# Patient Record
Sex: FEMALE | Race: WHITE | NOT HISPANIC OR LATINO | Employment: OTHER | URBAN - METROPOLITAN AREA
[De-identification: names, ages, dates, MRNs, and addresses within clinical notes are randomized per-mention and may not be internally consistent; named-entity substitution may affect disease eponyms.]

---

## 2017-04-06 ENCOUNTER — GENERIC CONVERSION - ENCOUNTER (OUTPATIENT)
Dept: OTHER | Facility: OTHER | Age: 57
End: 2017-04-06

## 2017-04-07 ENCOUNTER — GENERIC CONVERSION - ENCOUNTER (OUTPATIENT)
Dept: OTHER | Facility: OTHER | Age: 57
End: 2017-04-07

## 2017-04-07 LAB — T4 FREE SERPL-MCNC: 1.26 NG/DL (ref 0.82–1.77)

## 2017-05-23 ENCOUNTER — ALLSCRIPTS OFFICE VISIT (OUTPATIENT)
Dept: OTHER | Facility: OTHER | Age: 57
End: 2017-05-23

## 2017-05-23 DIAGNOSIS — E04.9 NONTOXIC GOITER: ICD-10-CM

## 2017-05-27 ENCOUNTER — HOSPITAL ENCOUNTER (OUTPATIENT)
Dept: RADIOLOGY | Facility: HOSPITAL | Age: 57
Discharge: HOME | End: 2017-05-27
Payer: COMMERCIAL

## 2017-05-27 DIAGNOSIS — E04.9 NONTOXIC GOITER: ICD-10-CM

## 2017-05-27 PROCEDURE — 76536 US EXAM OF HEAD AND NECK: CPT

## 2017-05-30 ENCOUNTER — GENERIC CONVERSION - ENCOUNTER (OUTPATIENT)
Dept: OTHER | Facility: OTHER | Age: 57
End: 2017-05-30

## 2017-07-20 ENCOUNTER — ALLSCRIPTS OFFICE VISIT (OUTPATIENT)
Dept: OTHER | Facility: OTHER | Age: 57
End: 2017-07-20

## 2017-07-20 DIAGNOSIS — Z12.39 ENCOUNTER FOR OTHER SCREENING FOR MALIGNANT NEOPLASM OF BREAST: ICD-10-CM

## 2017-09-19 ENCOUNTER — ALLSCRIPTS OFFICE VISIT (OUTPATIENT)
Dept: OTHER | Facility: OTHER | Age: 57
End: 2017-09-19

## 2017-09-19 LAB — S PYO AG THROAT QL: NEGATIVE

## 2017-10-24 ENCOUNTER — HOSPITAL ENCOUNTER (OUTPATIENT)
Dept: RADIOLOGY | Facility: HOSPITAL | Age: 57
Discharge: HOME | End: 2017-10-24
Payer: COMMERCIAL

## 2017-10-24 DIAGNOSIS — Z12.39 ENCOUNTER FOR OTHER SCREENING FOR MALIGNANT NEOPLASM OF BREAST: ICD-10-CM

## 2017-10-24 PROCEDURE — 77063 BREAST TOMOSYNTHESIS BI: CPT

## 2017-10-24 PROCEDURE — G0202 SCR MAMMO BI INCL CAD: HCPCS

## 2017-10-25 ENCOUNTER — GENERIC CONVERSION - ENCOUNTER (OUTPATIENT)
Dept: OTHER | Facility: OTHER | Age: 57
End: 2017-10-25

## 2017-11-02 ENCOUNTER — GENERIC CONVERSION - ENCOUNTER (OUTPATIENT)
Dept: OTHER | Facility: OTHER | Age: 57
End: 2017-11-02

## 2017-11-02 LAB
A/G RATIO (HISTORICAL): 1.8 (ref 1.2–2.2)
ALBUMIN SERPL BCP-MCNC: 4.6 G/DL (ref 3.5–5.5)
ALP SERPL-CCNC: 86 IU/L (ref 39–117)
ALT SERPL W P-5'-P-CCNC: 21 IU/L (ref 0–32)
AST SERPL W P-5'-P-CCNC: 24 IU/L (ref 0–40)
BILIRUB SERPL-MCNC: 0.7 MG/DL (ref 0–1.2)
BUN SERPL-MCNC: 13 MG/DL (ref 6–24)
BUN/CREA RATIO (HISTORICAL): 18 (ref 9–23)
CALCIUM SERPL-MCNC: 9.2 MG/DL (ref 8.7–10.2)
CHLORIDE SERPL-SCNC: 99 MMOL/L (ref 96–106)
CHOLEST SERPL-MCNC: 209 MG/DL (ref 100–199)
CO2 SERPL-SCNC: 23 MMOL/L (ref 18–29)
CREAT SERPL-MCNC: 0.72 MG/DL (ref 0.57–1)
EGFR AFRICAN AMERICAN (HISTORICAL): 106 ML/MIN/1.73
EGFR-AMERICAN CALC (HISTORICAL): 92 ML/MIN/1.73
GLUCOSE SERPL-MCNC: 96 MG/DL (ref 65–99)
HDLC SERPL-MCNC: 79 MG/DL
LDLC SERPL CALC-MCNC: 109 MG/DL (ref 0–99)
POTASSIUM SERPL-SCNC: 4.6 MMOL/L (ref 3.5–5.2)
SODIUM SERPL-SCNC: 139 MMOL/L (ref 134–144)
TOT. GLOBULIN, SERUM (HISTORICAL): 2.6 G/DL (ref 1.5–4.5)
TOTAL PROTEIN (HISTORICAL): 7.2 G/DL (ref 6–8.5)
TRIGL SERPL-MCNC: 104 MG/DL (ref 0–149)

## 2017-11-03 ENCOUNTER — GENERIC CONVERSION - ENCOUNTER (OUTPATIENT)
Dept: OTHER | Facility: OTHER | Age: 57
End: 2017-11-03

## 2017-11-03 LAB
INTERPRETATION (HISTORICAL): NORMAL
T4 FREE SERPL-MCNC: 1.36 NG/DL (ref 0.82–1.77)
TSH SERPL DL<=0.05 MIU/L-ACNC: 4.62 UIU/ML (ref 0.45–4.5)

## 2017-11-13 ENCOUNTER — ALLSCRIPTS OFFICE VISIT (OUTPATIENT)
Dept: OTHER | Facility: OTHER | Age: 57
End: 2017-11-13

## 2017-11-14 NOTE — PROGRESS NOTES
Assessment    1  Hypothyroidism (244 9) (E03 9)   2  BMI 28 0-28 9,adult (V85 24) (Z68 28)   3  Anxiety (300 00) (F41 9)    Plan  Anxiety, Symptomatic menopausal or female climacteric states    · Venlafaxine HCl ER 37 5 MG Oral Capsule Extended Release 24 Hour; 1 podaily  BMI 28 0-28 9,adult    · Begin a limited exercise program ; Status:Complete;   Done: 19FVL2596  Hypothyroidism    · Levothyroxine Sodium 50 MCG Oral Tablet; TAKE 1 TABLET DAILY ASDIRECTED   · (1) T4, FREE; Status:Active; Requested for:01May2018;    · (1) TSH; Status:Active; Requested for:01May2018;    · Follow-up visit in 6 months Evaluation and Treatment  Follow-up  Status: Hold For -Scheduling  Requested for: 73GZN3957    Discussion/Summary    Hypothyroidism - TSH is borderline, but she is feeling well  Will continue current dose- stable  Possible side effects of new medications were reviewed with the patient/guardian today  The treatment plan was reviewed with the patient/guardian  The patient/guardian understands and agrees with the treatment plan      Chief Complaint  pt present for follow up on lab work  ac/cma      History of Present Illness  Her anxiety has been good  the venlafaxine is working well for her  The patient is being seen for follow-up of hypothyroidism of undetermined etiology  The patient reports doing well  Interval symptoms:  worsened weight gain  Medications:  the patient is adherent to her medication regimen, but-- she denies medication side effects  Disease management:  the patient is not doing well with her goals  Active Problems    1  Allergic rhinitis (477 9) (J30 9)   2  Anxiety (300 00) (F41 9)   3  Apnea (786 03) (R06 81)   4  Arthropathy (716 90) (M12 9)   5  Atrophic vaginitis (627 3) (N95 2)   6  Complex cyst of right ovary (620 2) (N83 291)   7  Dyspareunia, female (625 0) (N94 10)   8  Encounter for other screening for malignant neoplasm of breast (V76 19) (Z12 39)   9  Goiter (240 9) (E04 9)   10  Hypothyroidism (244 9) (E03 9)   11  Long term use of drug (V58 69) (Z79 899)   12  Obstructive sleep apnea (327 23) (G47 33)   13  Postmenopausal atrophic vaginitis (627 3) (N95 2)   14  Screening for cardiovascular condition (V81 2) (Z13 6)   15  Shellfish allergy (V15 04) (Z91 013)   16  Symptomatic menopausal or female climacteric states (627 2) (N95 1)   17  Uterus fibroma (218 9) (D25 9)   18  Visit for routine gyn exam (V72 31) (Z01 419)    Past Medical History    1  Acute pharyngitis (462) (J02 9)   2  History of Acute upper respiratory infection (465 9) (J06 9)   3  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)   4  History of Encounter for screening for malignant neoplasm of colon (V76 51) (Z12 11)   5  History of Encounter for screening mammogram for malignant neoplasm of breast (V76 12) (Z12 31)   6  History of acute sinusitis (V12 69) (Z87 09)   7  History of acute sinusitis (V12 69) (Z87 09)   8  History of hyperthyroidism (V12 29) (Z86 39)   9  Need for vaccination for DTP (V06 1) (Z23)   10  History of Postoperative visit (V58 49) (Z48 89)   11  Screening for deficiency anemia (V78 1) (Z13 0)   12  Screening for hyperlipidemia (V77 91) (Z13 220)   13  History of Urinary Tract Infection (V13 02)   14  History of Well adult on routine health check (V70 0) (Z00 00)    Surgical History  1  History of  Section   2  History of  Section   3  History of Tubal Ligation    Family History  Mother    1  Family history of malignant neoplasm of uterus (V16 49) (Z80 49)   2  Family history of Heart problem  Father    3  Family history of diabetes mellitus (V18 0) (Z83 3)  Brother    4  Family history of malignant neoplasm of tonsil (V16 8) (Z80 8)  Maternal Cousin    5   Family history of malignant neoplasm of uterus (V16 49) (Z80 49)    Social History     · Acute alcohol use   · Currently sexually active   · Daily caffeine consumption, 2-3 servings a day   · Denied: History of Drug use   · Exercises 1 to 2 times per week (V49 89) (Z78 9)   · Full-time employment   ·    · Never a smoker   · Two children    Current Meds   1  B Complex-C Oral Tablet; TAKE 1 TABLET DAILY AS DIRECTED; Therapy: (Recorded:19Sep2017) to Recorded   2  Calcium Aspartate TABS; 1000 mg once a day; Therapy: (Recorded:19Sep2017) to Recorded   3  Claritin 10 MG Oral Capsule; Take one tablet daily; Therapy: (Recorded:19Sep2017) to Recorded   4  Daily Vitamin Oral Tablet; 1 Every Day; Therapy: 81ZWK9071 to  Requested for: 22PLW3007 Recorded   5  EpiPen 2-Fabian 0 3 MG/0 3ML Injection Solution Auto-injector; inject as needed for allergic reaction; Therapy: 25ZDJ0259 to (Last Rx:25Kxq0067)  Requested for: 88KQY3717 Ordered   6  Latanoprost 0 005 % Ophthalmic Solution; INSTILL 1 DROP IN BOTH EYES AT BEDTIME Recorded   7  Levothyroxine Sodium 50 MCG Oral Tablet; TAKE 1 TABLET DAILY AS DIRECTED; Therapy: 22BGJ7223 to (Evaluate:14Jan2018)  Requested for: 99VTS5464; Last Rx:79Srz3671 Ordered   8  Venlafaxine HCl ER 37 5 MG Oral Capsule Extended Release 24 Hour; 1 po daily; Therapy: 19RGA5799 to (Evaluate:25Nov2017)  Requested for: 15XMM2920; Last Rx:30Nov2016 Ordered   9  Vitamin D 1000 UNIT CAPS; once daily; Therapy: (Recorded:19Sep2017) to Recorded    Allergies  1  No Known Drug Allergies  2  Shellfish    Vitals  Vital Signs    Recorded: 80DYI0350 06:54PM   Temperature 97 9 F   Heart Rate 88   Respiration 16   Systolic 405   Diastolic 70   Height 5 ft 4 5 in   Weight 170 lb    BMI Calculated 28 73   BSA Calculated 1 84       Physical Exam   Constitutional  General appearance: No acute distress, well appearing and well nourished  Ears, Nose, Mouth, and Throat  External inspection of ears and nose: Normal    Otoscopic examination: Tympanic membranes translucent with normal light reflex  Canals patent without erythema  Oropharynx: Normal with no erythema, edema, exudate or lesions     Pulmonary  Auscultation of lungs: Clear to auscultation  no rales or crackles were heard bilaterally  no rhonchi  no friction rub  no wheezing  Cardiovascular  Auscultation of heart: Normal rate and rhythm, normal S1 and S2, without murmurs     Musculoskeletal  Gait and station: Normal          Signatures   Electronically signed by : Chelsey Copeland DO; Nov 13 2017  7:18PM EST                       (Author)

## 2018-01-10 NOTE — RESULT NOTES
Discussion/Summary   Tenisha Severe,   Your thyroid ultrasound does not show any nodules  You do have thyroiditis which is related to your underactive thyroid  Dr Rich Menard [id number] 07:55AM Crowmaciej Ministerioanh Order Number: [id number]    - Patient Instructions: To schedule this appointment, please contact Central Scheduling at [phone]  Test Name Result Flag Reference   US THYROID (Report)     THYROID ULTRASOUND     INDICATION: 47-year-old female with hypothyroidism  COMPARISON: None  TECHNIQUE:  Ultrasound of the thyroid was performed with a high frequency linear transducer in transverse and sagittal planes including volumetric imaging sweeps as well as traditional still imaging technique  FINDINGS:   Thyroid parenchyma is diffusely heterogeneous in echotexture  Right gland: 4 0 x 1 5 x 1 4 cm  No dominant nodules  Moderate vascularity throughout the lobe  Left gland: 3 3 x 1 5 x 1 5 cm  No dominant nodules  Moderate vascularity throughout the lobe  Isthmus: The isthmus is 0 2 cm in AP dimension  IMPRESSION:      Heterogeneous, hypervascular thyroid gland consistent with thyroiditis  No discrete nodules               Workstation performed: [id number]     Signed by:   Kia Gomez MD   5/30/17

## 2018-01-11 NOTE — RESULT NOTES
Message   The ultrasound shows 3 small fibroids which are stable but there is a large cyst of the right ovary  Please return to the office to evaluate and discuss options     Verified Results  Edna Morillo 12:49PM Vish Kim Order Number: [id number]    Order Number: [id number]     Test Name Result Flag Reference   US PELVIS COMPLETE NON OB (Report)     PELVIC ULTRASOUND, COMPLETE     INDICATION: Leiomyoma of uterus, unspecified  COMPARISON: July 18, 2011     TECHNIQUE:  Transabdominal pelvic ultrasound was performed in sagittal and transverse planes with a curvilinear transducer  Additional transvaginal imaging was performed to better evaluate the endometrium and ovaries  Imaging included volumetric    sweeps as well as traditional still imaging technique  FINDINGS:     UTERUS:   The uterus is anteverted in position, measuring 10 9 x 4 5 x 5 9 cm  Uterus enlarged and heterogeneous in echotexture  2 1 x 2 1 x 2 3 cm fibroid in the posterior aspect of the uterine fundus  This is in close approximation to the endometrial stripe  Previously, this measured 2 9 x 2 9 x 2 5 cm  2 7 x 2 2 x 2 7 cm    subserosal fibroid in the posterior, left lateral aspect of the uterine fundus (previously 2 7 x 1 9 x 2 9 cm)  2 0 x 1 5 x 1 8 cm peripherally calcified intramural fibroid in the anterior aspect of the mid body of the uterus, not seen on the prior    examination  The cervix shows no suspicious abnormality  ENDOMETRIUM:    Normal caliber of 5 mm  Homogenous and normal in appearance  OVARIES/ADNEXA:   Right ovary: 12 2 x 8 7 x 8 6 cm  There is a 11 0 x 7 7 x 8 0 cm complex, predominantly cystic mass in the right adnexa, encompassing nearly the entire right ovary  There are low-level internal echoes with multiple internal septations  There is peripheral flow  Left ovary: 2 9 x 1 4 x 1 6 cm  No suspicious left ovarian abnormality        Doppler flow within normal limits  No suspicious adnexal mass or loculated collections  There is no free fluid  IMPRESSION:   1  Enlarged fibroid uterus  2  Approximately 11 cm complex cystic mass in the right adnexa, encompassing nearly the entire right ovary  According to the consensus conference statement from the Society of Radiologists in Ultrasound (Radiology:Volume 256: Number 3-September 2010  pp [phone] ) this lesion has imaging features of a complex cystic mass  In this premenopausal woman, this should be further evaluated by MR and/or surgical consultation  Also, consider follow-up CA-125           ##cfslh   I personally discussed this result with Tyrone Alvarado on 7/19/2016 2:20 PM    ##            Workstation performed: [id number]     Signed by:   Anitha Hays DO   7/19/16

## 2018-01-12 VITALS
RESPIRATION RATE: 14 BRPM | TEMPERATURE: 99.9 F | DIASTOLIC BLOOD PRESSURE: 70 MMHG | WEIGHT: 164 LBS | HEART RATE: 78 BPM | HEIGHT: 65 IN | SYSTOLIC BLOOD PRESSURE: 128 MMHG | BODY MASS INDEX: 27.32 KG/M2

## 2018-01-12 NOTE — MISCELLANEOUS
Message  Return to work or school:   Trish Green is under my professional care  She was seen in my office on 9/19/2017   She is able to return to work on  9/21/2017       DENISE Olson        Signatures   Electronically signed by : Issa Hsu; Sep 19 2017  9:24AM EST                       (Author)    Electronically signed by : Sanjana Dangelo DO; Sep 19 2017  8:26PM EST                       (Author)

## 2018-01-12 NOTE — RESULT NOTES
Verified Results  MAMMO SCREENING BILATERAL W 3D & CAD [id number] 01:51PM Gunner Nab Order Number: [id number]    Order Number: [id number]    Order Number: [id number]    - Patient Instructions: To schedule this appointment, please contact Central Scheduling at [phone]  Do not wear any perfume, powder, lotion or deodorant on breast or underarm area  Please bring your doctors order, referral (if needed) and insurance information with you on the day of the test  Failure to bring this information may result in this test being rescheduled  Arrive 15 minutes prior to your appointment time to register  On the day of your test, please bring any prior mammogram or breast studies with you that were not performed at a [facility]  Failure to bring prior exams may result in your test needing to be rescheduled  Test Name Result Flag Reference   MAMMO SCREENING BILATERAL W 3D & CAD (Report)     Patient History:   Patient is postmenopausal    Family history of breast cancer in mother at age 80  Reason for exam: screening (asymptomatic)  Screening     Mammo Screening Bilateral W DBT and CAD: October 17, 2016 - Check   In #: [de-identified]   2D/3D Procedure   3D views: Bilateral MLO, CC, and XCCL view(s) were taken  2D views: Bilateral MLO and CC view(s) were taken  Technologist: Freddy Hooker   Prior study comparison: September 3, 2015, bilateral screening    mammogram performed at [place] [address]  July 28, 2014, bilateral screening mammogram performed at [place] [address]  July 10, 2013, bilateral screening    mammogram performed at [place] [address]  June 28, 2012, bilateral screening mammogram performed at [place] [address]  June 2, 2011, right breast screening    mammogram performed at [place] [address]   May    18, 2010, bilateral screening mammogram performed at [place] [address]  March 26, 2009, screening mammogram    performed at [place] [address]  The breast tissue is extremely dense, potentially limiting the    sensitivity of mammography  Patient risk, included in this    report, assists in determining the appropriate screening regimen    (such as 3-D mammography or the inclusion of automated breast    ultrasound or MRI)  3-D mammography may also remain indicated as    screening  No dominate soft tissue mass, architectural    distortion or suspicious calcifications are noted  The skin and    nipple contours are within normal limits  No evidence of    malignancy  No significant change when compared to the prior    studies  1  No evidence of malignancy  2  No significant change when compared with the prior study  ASSESSMENT: BiRad:1 - Negative     Recommendation:   Routine screening mammogram of both breasts in 1 year  A reminder letter will be scheduled   8-10% of cancers will be missed on mammography  Management of a    palpable abnormality must be based on clinical grounds  Patients    will be notified of their results via letter from our facility  Accredited by [employer] of Radiology and FDA       Transcription Location: [facility] 98: [id number]     Risk Value(s):   Tyrer-Cuzick 10 Year: 6 658%, Tyrer-Cuzick Lifetime: 17 606%,    Myriad Table: 1 5%, JANETH 5 Year: 2 4%, NCI Lifetime: 13 4%   Signed by:   Lea Finch MD   10/17/16

## 2018-01-13 VITALS
BODY MASS INDEX: 27.38 KG/M2 | SYSTOLIC BLOOD PRESSURE: 128 MMHG | HEIGHT: 66 IN | DIASTOLIC BLOOD PRESSURE: 64 MMHG | WEIGHT: 170.38 LBS

## 2018-01-13 NOTE — RESULT NOTES
Discussion/Summary   Appointment 11/13/17   Please print and put in my folder  Dr Jeremías Lynch      Verified Results  (1) TSH [id number] 08:30AM Pia Linen     Test Name Result Flag Reference   TSH 4 620 uIU/mL H 0 450-4 500     (1) COMPREHENSIVE METABOLIC PANEL 59KCN8276 16:20BW Pia Linen     Test Name Result Flag Reference   Glucose, Serum 96 mg/dL  65-99   BUN 13 mg/dL  6-24   Creatinine, Serum 0 72 mg/dL  0 57-1 00   BUN/Creatinine Ratio 18  9-23   Sodium, Serum 139 mmol/L  134-144   Potassium, Serum 4 6 mmol/L  3 5-5 2   Chloride, Serum 99 mmol/L     Carbon Dioxide, Total 23 mmol/L  18-29   Calcium, Serum 9 2 mg/dL  8 7-10 2   Protein, Total, Serum 7 2 g/dL  6 0-8 5   Albumin, Serum 4 6 g/dL  3 5-5 5   Globulin, Total 2 6 g/dL  1 5-4 5   A/G Ratio 1 8  1 2-2 2   Bilirubin, Total 0 7 mg/dL  0 0-1 2   Alkaline Phosphatase, S 86 IU/L     AST (SGOT) 24 IU/L  0-40   ALT (SGPT) 21 IU/L  0-32   eGFR If NonAfricn Am 92 mL/min/1 73  >59   eGFR If Africn Am 106 mL/min/1 73  >59     (1) LIPID PANEL FASTING W DIRECT LDL REFLEX [id number] 08:30AM Pia Linen     Test Name Result Flag Reference   Cholesterol, Total 209 mg/dL H 100-199   Triglycerides 104 mg/dL  0-149   HDL Cholesterol 79 mg/dL  >39   LDL Cholesterol Calc 109 mg/dL H 0-99     (LC) Thyroxine (T4) Free, Direct, [place] 60MBC2256 08:30AM Pia Linen     Test Name Result Flag Reference   T4,Free(Direct) 1 36 ng/dL  0 82-1 77     [facility]) Cardiovascular Risk Assessment 23MDE8251 08:30AM Pia Linen     Test Name Result Flag Reference   Interpretation Note     Supplemental report is available  PDF Image

## 2018-01-14 VITALS
SYSTOLIC BLOOD PRESSURE: 124 MMHG | HEART RATE: 88 BPM | HEIGHT: 65 IN | TEMPERATURE: 97.9 F | BODY MASS INDEX: 28.32 KG/M2 | DIASTOLIC BLOOD PRESSURE: 70 MMHG | WEIGHT: 170 LBS | RESPIRATION RATE: 16 BRPM

## 2018-01-14 NOTE — RESULT NOTES
Verified Results  (1) TSH 06Apr2017 12:13PM [facility] [facility] [employer]     Test Name Result Flag Reference   TSH 4 120 uIU/mL  0 450-4 500     [facility]) Thyroxine (T4) Free, Direct, S [id number] 12:13PM [employer] [facility] [employer]     Test Name Result Flag Reference   T4,Free(Direct) 1 26 ng/dL  0 82-1 77       Discussion/Summary   Tahmina Ruiz,   Your thyroid levels are normal   Please schedule a follow up appointment       Dr George Bell

## 2018-01-15 NOTE — RESULT NOTES
Verified Results  MAMMO SCREENING BILATERAL W 3D & CAD [id number] 04:24PM Kathi Abler Order Number: [id number]    - Patient Instructions: To schedule this appointment, please contact Central Scheduling at [phone]  Do not wear any perfume, powder, lotion or deodorant on breast or underarm area  Please bring your doctors order, referral (if needed) and insurance information with you on the day of the test  Failure to bring this information may result in this test being rescheduled  Arrive 15 minutes prior to your appointment time to register  On the day of your test, please bring any prior mammogram or breast studies with you that were not performed at a [facility]  Failure to bring prior exams may result in your test needing to be rescheduled  Test Name Result Flag Reference   MAMMO SCREENING BILATERAL W 3D & CAD (Report)     Patient History:   Patient is postmenopausal    Family history of endometrial cancer at age 80 in mother  Patient's BMI is 26 8  Reason for exam: screening, asymptomatic  Mammo Screening Bilateral W DBT and CAD: October 24, 2017 - Check   In #: [de-identified]   2D/3D Procedure   3D Bilateral CC and MLO view(s) were taken  2D Bilateral CC and MLO view(s) were taken  Technologist: SAHARA Tamez   Prior study comparison: October 17, 2016, mammo screening    bilateral W DBT and CAD performed at [place] [address]  September 3, 2015, bilateral screening mammogram    performed at [place] [address]  July 28, 2014,   bilateral screening mammogram performed at [place] [address]  July 10, 2013, bilateral screening mammogram    performed at [place] [address]  June 28, 2012,   bilateral screening mammogram performed at [place] [address]  The breast tissue is heterogeneously dense, potentially limiting    the sensitivity of mammography   Patient risk, included in this    report, assists in determining the appropriate screening regimen    (such as 3-D mammography or the inclusion of automated breast    ultrasound or MRI)  3-D mammography may also remain indicated as    screening  No dominant soft tissue mass, architectural distortion or    suspicious calcifications are noted in either breast   The skin    and nipple contours are within normal limits  No significant changes when compared with prior studies  ACR BI-RADSï¾® Assessments: BiRad:1 - Negative   A reminder letter will be scheduled  Recommendation:   Routine screening mammogram of both breasts in 1 year  A    reminder letter will be scheduled  The patient is scheduled in a reminder system for screening    mammography  8-10% of cancers will be missed on mammography  Management of a    palpable abnormality must be based on clinical grounds  Patients    will be notified of their results via letter from our facility  Accredited by [employer] of [employer] and FDA       Transcription Location: [facility] 98: [id number]     Risk Value(s):   Tyrer-Cuzick 10 Year: 3 700%, Tyrer-Cuzick Lifetime: 9 700%,    Myriad Table: 1 5%, JANETH 5 Year: 1 4%, NCI Lifetime: 7 6%

## 2018-01-15 NOTE — RESULT NOTES
Message   appointment 5/27/16   Please print and put in my folder   Dr Kelby Najjar     Verified Results  (1) COMPREHENSIVE METABOLIC PANEL [id number] 04:76VQ [employer]     Test Name Result Flag Reference   Glucose, Serum 84 mg/dL  65-99   BUN 14 mg/dL  6-24   Creatinine, Serum 0 71 mg/dL  0 57-1 00   eGFR If NonAfricn Am 94 mL/min/1 73  >59   eGFR If Africn Am 109 mL/min/1 73  >59   BUN/Creatinine Ratio 20  9-23   Sodium, Serum 140 mmol/L  134-144   Potassium, Serum 4 6 mmol/L  3 5-5 2   Chloride, Serum 100 mmol/L     Carbon Dioxide, Total 26 mmol/L  18-29   Calcium, Serum 9 3 mg/dL  8 7-10 2   Protein, Total, Serum 7 2 g/dL  6 0-8 5   Albumin, Serum 4 7 g/dL  3 5-5 5   Globulin, Total 2 5 g/dL  1 5-4 5   A/G Ratio 1 9  1 1-2 5   Bilirubin, Total 0 5 mg/dL  0 0-1 2   Alkaline Phosphatase, S 91 IU/L     AST (SGOT) 27 IU/L  0-40   ALT (SGPT) 23 IU/L  0-32     (LC) Lipid Panel With LDL/HDL Ratio [id number] 08:22AM [employer]     Test Name Result Flag Reference   Cholesterol, Total 200 mg/dL H 100-199   Triglycerides 65 mg/dL  0-149   HDL Cholesterol 84 mg/dL  >39   According to ATP-III Guidelines, HDL-C >59 mg/dL is considered a  negative risk factor for CHD  VLDL Cholesterol Jag 13 mg/dL  5-40   LDL Cholesterol Calc 103 mg/dL H 0-99   LDL/HDL Ratio 1 2 ratio units  0 0-3 2   LDL/HDL Ratio                                                             Men  Women                                               1/2 Avg  Risk  1 0    1 5                                                   Avg Risk  3 6    3 2                                                2X Avg  Risk  6 2    5 0                                                3X Avg  Risk  8 0    6 1     (LC) TSH+Free T4 [id number] 08:22AM [employer]     Test Name Result Flag Reference   TSH 4 080 uIU/mL  0 450-4 500   T4,Free(Direct) 1 30 ng/dL  0 82-1 77     [facility] Cardiovascular Risk Assessment [id number] 08:22AM [employer]     Test Name Result Flag Reference Interpretation Note     Supplement report is available  PDF Image

## 2018-01-16 NOTE — PROGRESS NOTES
Assessment    1  Encounter for preventive health examination (V70 0) (Z00 00)   2  BMI 27 0-27 9,adult (V85 23) (Z68 27)   3  Goiter (240 9) (E04 9)   4  Hypothyroidism (244 9) (E03 9)   5  Screening for cardiovascular condition (V81 2) (Z13 6)   6  Never a smoker   7  Need for vaccination for DTP (V06 1) (Z23)   8  Shellfish allergy (V15 04) (Z91 013)    Plan  BMI 27 0-27 9,adult    · Begin a limited exercise program ; Status:Complete;   Done: 13MBE5078  Goiter    · US THYROID; Status:Active; Requested for:55Dsn9717;   Hypothyroidism    · Levothyroxine Sodium 50 MCG Oral Tablet; TAKE 1 TABLET DAILY AS  DIRECTED   · (1) T4, FREE; Status:Active; Requested XUV:53KEZ0266;    · (1) TSH; Status:Active; Requested TIK:60ZOM7083;   Need for vaccination for DTP    · Adacel 5-2-15 5 LF-MCG/0 5 Intramuscular Suspension; INJECT 0 5  ML  Intramuscular; To Be Done: 19UGR8924  Screening for cardiovascular condition    · (1) COMPREHENSIVE METABOLIC PANEL; Status:Active; Requested WIR:55LIE6097;    · (1) LIPID PANEL FASTING W DIRECT LDL REFLEX; Status:Active; Requested  MQQ:04AFE4249;    · Follow-up visit in 6 months Evaluation and Treatment  Follow-up  Status: Hold For -  Scheduling  Requested for: 78BPY4856  Shellfish allergy    · EpiPen 2-Fabian 0 3 MG/0 3ML Injection Solution Auto-injector; inject as needed for  allergic reaction    Discussion/Summary  health maintenance visit Currently, she eats a healthy diet and has an inadequate exercise regimen  cervical cancer screening is not indicated Breast cancer screening: mammogram is current  Colorectal cancer screening: colorectal cancer screening is current and the next colonoscopy is due 2020  The immunizations will be given as outlined in the orders  Advice and education were given regarding aerobic exercise  Chief Complaint  wellness exam      History of Present Illness  HM, Adult Female: The patient is being seen for a health maintenance evaluation  General Health:  The patient's health since the last visit is described as good  Lifestyle:  She consumes a diverse and healthy diet  She has weight concerns  She does not exercise regularly  She does not use tobacco    Reproductive health: the patient is postmenopausal   she reports normal menses  she uses no contraception  Screening:      Review of Systems    Constitutional: No fever, no chills, feels well, no tiredness, no recent weight gain or weight loss  Cardiovascular: No complaints of slow heart rate, no fast heart rate, no chest pain, no palpitations, no leg claudication, no lower extremity edema  Respiratory: No complaints of shortness of breath, no wheezing, no cough, no SOB on exertion, no orthopnea, no PND  Gastrointestinal: No complaints of abdominal pain, no constipation, no nausea or vomiting, no diarrhea, no bloody stools  Musculoskeletal: No complaints of arthralgias, no myalgias, no joint swelling or stiffness, no limb pain or swelling  Psychiatric: Not suicidal, no sleep disturbance, no anxiety or depression, no change in personality, no emotional problems  Active Problems    1  Allergic rhinitis (477 9) (J30 9)   2  Anxiety (300 00) (F41 9)   3  Apnea (786 03) (R06 81)   4  Arthropathy (716 90) (M12 9)   5  Atrophic vaginitis (627 3) (N95 2)   6  Complex cyst of right ovary (620 2) (N83 291)   7  Dyspareunia, female (625 0) (N94 10)   8  Hypothyroidism (244 9) (E03 9)   9  Long term use of drug (V58 69) (Z79 899)   10  Obstructive sleep apnea (327 23) (G47 33)   11  Postmenopausal atrophic vaginitis (627 3) (N95 2)   12  Screening for breast cancer (V76 10) (Z12 39)   13  Symptomatic menopausal or female climacteric states (627 2) (N95 1)   14  Uterus fibroma (218 9) (D25 9)   15   Visit for routine gyn exam (V72 31) (Z01 419)    Past Medical History    · History of Acute upper respiratory infection (465 9) (J06 9)   · 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)   · History of Encounter for screening for malignant neoplasm of colon (V76 51) (Z12 11)   · History of Encounter for screening mammogram for malignant neoplasm of breast  (V76 12) (Z12 31)   · History of acute sinusitis (V12 69) (Z87 09)   · History of acute sinusitis (V12 69) (Z87 09)   · History of hyperthyroidism (V12 29) (Z86 39)   · History of Postoperative visit (V58 49) (Z48 89)   · Screening for deficiency anemia (V78 1) (Z13 0)   · Screening for hyperlipidemia (V77 91) (Z13 220)   · History of Urinary Tract Infection (V13 02)   · History of Well adult on routine health check (V70 0) (Z00 00)    Surgical History    · History of  Section   · History of  Section   · History of Tubal Ligation    Family History  Mother    · Family history of malignant neoplasm of uterus (V16 49) (Z80 49)   · Family history of Heart problem  Father    · Family history of diabetes mellitus (V18 0) (Z83 3)  Brother    · Family history of malignant neoplasm of tonsil (V16 8) (Z80 8)  Maternal Cousin    · Family history of malignant neoplasm of uterus (V16 49) (Z80 49)    Social History    · Acute alcohol use   · Currently sexually active   · Daily caffeine consumption, 2-3 servings a day   · Denied: History of Drug use   · Exercises 1 to 2 times per week (V49 89) (Z78 9)   ·    · Never a smoker   · Two children    Current Meds   1  B Complex-C Oral Tablet; Therapy: (Recorded:96Bal8296) to Recorded   2  Calcium 600 MG Oral Tablet; Therapy: (Recorded:03Abw4607) to Recorded   3  Claritin 10 MG Oral Capsule; Therapy: (Recorded:13Xkg6082) to Recorded   4  Daily Vitamin Oral Tablet; 1 Every Day; Therapy: 42DWM2667 to  Requested for: 71AWY8069 Recorded   5  EpiPen 2-Fabian 0 3 MG/0 3ML Injection Solution Auto-injector; inject as needed for allergic   reaction; Therapy: 17ZBQ4235 to (Last Rx:2015)  Requested for: 2015 Ordered   6  Latanoprost 0 005 % Ophthalmic Solution; INSTILL 1 DROP IN BOTH EYES AT   BEDTIME Recorded   7  Levothyroxine Sodium 50 MCG Oral Tablet; TAKE 1 TABLET DAILY AS DIRECTED; Therapy: 07KXJ8548 to (Evaluate:69Zmp3644)  Requested for: 18Gog6371; Last   Rx:18Apr2017 Ordered   8  Venlafaxine HCl ER 37 5 MG Oral Capsule Extended Release 24 Hour; 1 po daily; Therapy: 06NTU7041 to (Evaluate:25Nov2017)  Requested for: 87CIE8581; Last   Rx:30Nov2016 Ordered   9  Vitamin D 1000 UNIT CAPS; Therapy: (Recorded:22Pef5936) to Recorded    Allergies    1  No Known Drug Allergies    2  Shellfish    Vitals   Recorded: 19SHN1485 01:03PM   Temperature 97 1 F   Heart Rate 72   Respiration 16   Systolic 157   Diastolic 88   Height 5 ft 6 in   Weight 170 lb    BMI Calculated 27 44   BSA Calculated 1 87     Physical Exam    Constitutional   General appearance: No acute distress, well appearing and well nourished  Ears, Nose, Mouth, and Throat   External inspection of ears and nose: Normal     Otoscopic examination: Tympanic membranes translucent with normal light reflex  Canals patent without erythema  Oropharynx: Normal with no erythema, edema, exudate or lesions  Neck   Neck: Supple, symmetric, trachea midline, no masses  Pulmonary   Auscultation of lungs: Clear to auscultation  Cardiovascular   Auscultation of heart: Normal rate and rhythm, normal S1 and S2, no murmurs  Abdomen   Abdomen: Non-tender, no masses  Liver and spleen: No hepatomegaly or splenomegaly  Lymphatic   Palpation of lymph nodes in neck: No lymphadenopathy  Musculoskeletal   Gait and station: Normal     Skin   Skin and subcutaneous tissue: Normal without rashes or lesions  Neurologic   Cortical function: Normal mental status  Coordination: Normal finger to nose and heel to shin      Psychiatric   Mood and affect: Normal        Results/Data  PHQ-2 Adult Depression Screening 61HCN1833 01:05PM User, s     Test Name Result Flag Reference   PHQ-2 Adult Depression Score 0     Over the last two weeks, how often have you been bothered by any of the following problems? Little interest or pleasure in doing things: Not at all - 0  Feeling down, depressed, or hopeless: Not at all - 0   PHQ-2 Adult Depression Screening Negative       (1) TSH 06Apr2017 12:13PM Andressa Covington     Test Name Result Flag Reference   TSH 4 120 uIU/mL  0 450-4 500     ERIC [facility] SCREENING BILATERAL W 3D & CAD 38TSP4152 01:51PM Haven Isaac Order Number: [id number]    Order Number: [id number]    Order Number: [id number]    - Patient Instructions: To schedule this appointment, please contact Central Scheduling at [phone]  Do not wear any perfume, powder, lotion or deodorant on breast or underarm area  Please bring your doctors order, referral (if needed) and insurance information with you on the day of the test  Failure to bring this information may result in this test being rescheduled  Arrive 15 minutes prior to your appointment time to register  On the day of your test, please bring any prior mammogram or breast studies with you that were not performed at a [facility]  Failure to bring prior exams may result in your test needing to be rescheduled  Test Name Result Flag Reference   MAMMO SCREENING BILATERAL W 3D & CAD (Report)     Patient History:   Patient is postmenopausal    Family history of breast cancer in mother at age 80  Reason for exam: screening (asymptomatic)  Screening     Mammo Screening Bilateral W DBT and CAD: October 17, 2016 - Check   In #: [de-identified]   2D/3D Procedure   3D views: Bilateral MLO, CC, and XCCL view(s) were taken  2D views: Bilateral MLO and CC view(s) were taken  Technologist: Aliza Sullivan   Prior study comparison: September 3, 2015, bilateral screening    mammogram performed at [place] [address]  July 28, 2014, bilateral screening mammogram performed at [place] [address]   July 10, 2013, bilateral screening    [facility]  June 28, 2012, bilateral screening mammogram performed at [place] [address]  June 2, 2011, right breast screening    mammogram performed at [place] [address]  May    18, 2010, bilateral screening mammogram performed at [place] [address]  March 26, 2009, screening mammogram    performed at [place] [address]  The breast tissue is extremely dense, potentially limiting the    sensitivity of mammography  Patient risk, included in this    report, assists in determining the appropriate screening regimen    (such as 3-D mammography or the inclusion of automated breast    ultrasound or MRI)  3-D mammography may also remain indicated as    screening  No dominate soft tissue mass, architectural    distortion or suspicious calcifications are noted  The skin and    nipple contours are within normal limits  No evidence of    malignancy  No significant change when compared to the prior    studies  1  No evidence of malignancy  2  No significant change when compared with the prior study  ASSESSMENT: BiRad:1 - Negative     Recommendation:   Routine screening mammogram of both breasts in 1 year  A reminder letter will be scheduled   8-10% of cancers will be missed on mammography  Management of a    palpable abnormality must be based on clinical grounds  Patients    will be notified of their results via letter from our facility  Accredited by [employer] of [employer] and FDA       Transcription Location: [facility] 98: [id number]     Risk Value(s):   Tyrer-Cuzick 10 Year: 6 658%, Tyrer-Cuzick Lifetime: 17 606%,    Myriad Table: 1 5%, JANETH 5 Year: 2 4%, NCI Lifetime: 13 4%   Signed by:   Darian Magana MD   10/17/16     COLONOSCOPY 14HOU3098 12:00AM      Test Name Result Flag Reference   Colonoscopy 4/9/2015         Procedure    Procedure: Just wernt to get eyes tested      Future Appointments    Date/Time Provider Specialty Site   07/12/2017 09:00 AM ELIZABETH Geronimo   Obstetrics/Gynecology [facility]  [facility]   Electronically signed by : Jacob Alston DO; May 23 2017  1:32PM EST                       (Author)

## 2018-01-22 VITALS
HEART RATE: 72 BPM | BODY MASS INDEX: 27.32 KG/M2 | SYSTOLIC BLOOD PRESSURE: 140 MMHG | HEIGHT: 66 IN | RESPIRATION RATE: 16 BRPM | TEMPERATURE: 97.1 F | WEIGHT: 170 LBS | DIASTOLIC BLOOD PRESSURE: 88 MMHG

## 2018-03-13 ENCOUNTER — OFFICE VISIT (OUTPATIENT)
Dept: PODIATRY | Facility: CLINIC | Age: 58
End: 2018-03-13
Payer: COMMERCIAL

## 2018-03-13 VITALS
WEIGHT: 160 LBS | HEART RATE: 81 BPM | RESPIRATION RATE: 17 BRPM | HEIGHT: 66 IN | DIASTOLIC BLOOD PRESSURE: 76 MMHG | SYSTOLIC BLOOD PRESSURE: 131 MMHG | BODY MASS INDEX: 25.71 KG/M2

## 2018-03-13 DIAGNOSIS — M21.969 ACQUIRED DEFORMITY OF FOOT, UNSPECIFIED LATERALITY: Primary | ICD-10-CM

## 2018-03-13 DIAGNOSIS — M20.10 VALGUS DEFORMITY OF GREAT TOE, UNSPECIFIED LATERALITY: ICD-10-CM

## 2018-03-13 DIAGNOSIS — Q66.52 CONGENITAL PES PLANUS OF LEFT FOOT: ICD-10-CM

## 2018-03-13 DIAGNOSIS — M79.672 PAIN IN BOTH FEET: ICD-10-CM

## 2018-03-13 DIAGNOSIS — Q66.51 CONGENITAL PES PLANUS OF RIGHT FOOT: ICD-10-CM

## 2018-03-13 DIAGNOSIS — M79.671 PAIN IN BOTH FEET: ICD-10-CM

## 2018-03-13 PROCEDURE — 73620 X-RAY EXAM OF FOOT: CPT | Performed by: PODIATRIST

## 2018-03-13 PROCEDURE — L3000 FT INSERT UCB BERKELEY SHELL: HCPCS | Performed by: PODIATRIST

## 2018-03-13 PROCEDURE — 99203 OFFICE O/P NEW LOW 30 MIN: CPT | Performed by: PODIATRIST

## 2018-03-13 NOTE — PROGRESS NOTES
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   She has early hallux valgus formation  This is secondary to pronation syndrome  Plan  X-rays taken  Patient educated on condition  She declines injection therapy  Her feet have been casted for custom molded foot orthotics  No problem-specific Assessment & Plan notes found for this encounter  Active Problems     1  Allergic rhinitis (477 9) (J30 9)   2  Anxiety (300 00) (F41 9)   3  Apnea (786 03) (R06 81)   4  Arthropathy (716 90) (M12 9)   5  Atrophic vaginitis (627 3) (N95 2)   6  Complex cyst of right ovary (620 2) (N83 291)   7  Dyspareunia, female (625 0) (N94 10)   8  Encounter for other screening for malignant neoplasm of breast (V76 19) (Z12 39)   9  Goiter (240 9) (E04 9)   10  Hypothyroidism (244 9) (E03 9)   11  Long term use of drug (V58 69) (Z79 899)   12  Obstructive sleep apnea (327 23) (G47 33)   13  Postmenopausal atrophic vaginitis (627 3) (N95 2)   14  Screening for cardiovascular condition (V81 2) (Z13 6)   15  Shellfish allergy (V15 04) (Z91 013)   16  Symptomatic menopausal or female climacteric states (627 2) (N95 1)   17  Uterus fibroma (218 9) (D25 9)   18  Visit for routine gyn exam (V72 31) (Z01 419)     Past Medical History     1  Acute pharyngitis (462) (J02 9)   2  History of Acute upper respiratory infection (465 9) (J06 9)   3  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)   4  History of Encounter for screening for malignant neoplasm of colon (V76 51) (Z12 11)   5  History of Encounter for screening mammogram for malignant neoplasm of breast (V76 12) (Z12 31)   6  History of acute sinusitis (V12 69) (Z87 09)   7  History of acute sinusitis (V12 69) (Z87 09)   8  History of hyperthyroidism (V12 29) (Z86 39)   9  Need for vaccination for DTP (V06 1) (Z23)   10  History of Postoperative visit (V58 49) (Z48 89)   11  Screening for deficiency anemia (V78 1) (Z13 0)   12   Screening for hyperlipidemia (V77 91) (Z13 220)   13  History of Urinary Tract Infection (V13 02)   14  History of Well adult on routine health check (V70 0) (Z00 00)     Surgical History  1  History of  Section   2  History of  Section   3  History of Tubal Ligation     Family History  Mother    1  Family history of malignant neoplasm of uterus (V16 49) (Z80 49)   2  Family history of Heart problem  Father    3  Family history of diabetes mellitus (V18 0) (Z83 3)  Brother    4  Family history of malignant neoplasm of tonsil (V16 8) (Z80 8)  Maternal Cousin    5  Family history of malignant neoplasm of uterus (V16 49) (Z80 49)     Social History      · Acute alcohol use   · Currently sexually active   · Daily caffeine consumption, 2-3 servings a day   · Denied: History of Drug use   · Exercises 1 to 2 times per week (V49 89) (Z78 9)   · Full-time employment   ·    · Never a smoker   · Two children     Current Meds   1  B Complex-C Oral Tablet; TAKE 1 TABLET DAILY AS DIRECTED; Therapy: (Recorded:68Iii4216) to Recorded   2  Calcium Aspartate TABS; 1000 mg once a day; Therapy: (Recorded:42Zsj3228) to Recorded   3  Claritin 10 MG Oral Capsule; Take one tablet daily; Therapy: (Recorded:40Vlj6017) to Recorded   4  Daily Vitamin Oral Tablet; 1 Every Day; Therapy: 93AED2480 to  Requested for: 45SHF8560 Recorded   5  EpiPen 2-Fabian 0 3 MG/0 3ML Injection Solution Auto-injector; inject as needed for allergic reaction; Therapy: 12WCI9584 to (Last Rx:09Tgl2980)  Requested for: 14JKG3446 Ordered   6  Latanoprost 0 005 % Ophthalmic Solution; INSTILL 1 DROP IN BOTH EYES AT BEDTIME Recorded   7  Levothyroxine Sodium 50 MCG Oral Tablet; TAKE 1 TABLET DAILY AS DIRECTED; Therapy: 15IAU8749 to (Evaluate:2018)  Requested for: 74TOZ9969; Last Rx:2017 Ordered   8  Venlafaxine HCl ER 37 5 MG Oral Capsule Extended Release 24 Hour; 1 po daily;  Therapy: 51CQM5466 to ([place] Linear)  Requested for: 53RTP1969; Last Rx:2016 Ordered 9  Vitamin D 1000 UNIT CAPS; once daily; Therapy: (Recorded:68Sjf7087) to Recorded     Allergies  1  No Known Drug Allergies  2  Shellfish     There are no diagnoses linked to this encounter  Subjective:      Patient ID: Aniya Wolff is a 59 y o  female  Patient presents for evaluation of a painful left big toe  No history of trauma  Patient has intermittent pain  This has been ongoing for many months  The following portions of the patient's history were reviewed and updated as appropriate: allergies, current medications, past family history, past medical history, past social history, past surgical history and problem list     Review of Systems      Objective:      Foot Exam    General  General Appearance: appears stated age and healthy   Orientation: alert and oriented to person, place, and time   Affect: appropriate   Gait: unimpaired       Right Foot/Ankle     Inspection and Palpation  Ecchymosis: none  Tenderness: great toe metatarsophalangeal joint   Swelling: none   Arch: pes planus  Hallux valgus: yes  Hallux limitus: yes    Neurovascular  Dorsalis pedis: 3+  Posterior tibial: 3+  Saphenous nerve sensation: normal  Tibial nerve sensation: normal  Superficial peroneal nerve sensation: normal  Deep peroneal nerve sensation: normal  Sural nerve sensation: normal    Muscle Strength  Ankle dorsiflexion: 5  Ankle plantar flexion: 5  Ankle inversion: 5  Ankle eversion: 5  Great toe extension: 5  Great toe flexion: 5    Range of Motion    Normal right ankle ROM    Tests  PT Tinel's sign: negative    Too many toes: positive       Left Foot/Ankle      Inspection and Palpation  Ecchymosis: none  Tenderness: great toe metatarsophalangeal joint   Swelling: great toe metatarsophalangeal joint   Arch: pes planus  Hallux valgus: yes  Hallux limitus: yes    Neurovascular  Dorsalis pedis: 3+  Posterior tibial: 3+  Saphenous nerve sensation: normal  Tibial nerve sensation: normal  Superficial peroneal nerve sensation: normal  Deep peroneal nerve sensation: normal  Sural nerve sensation: normal    Muscle Strength  Ankle dorsiflexion: 5  Ankle plantar flexion: 5  Ankle inversion: 5  Ankle eversion: 5  Great toe extension: 5  Great toe flexion: 5    Range of Motion    Normal left ankle ROM    Tests  PT Tinel's sign: negative  Too many toes: positive     Comments  X-ray  X-ray demonstrates early osteoarthritis of the 1st MPJ bilateral   Metatarsus adductus noted as well  Early bunion formation noted  Physical Exam   Cardiovascular:   Pulses:       Dorsalis pedis pulses are 3+ on the right side, and 3+ on the left side  Posterior tibial pulses are 3+ on the right side, and 3+ on the left side

## 2018-05-03 LAB
T4 FREE SERPL-MCNC: 1.33 NG/DL (ref 0.82–1.77)
TSH SERPL DL<=0.005 MIU/L-ACNC: 4.21 UIU/ML (ref 0.45–4.5)

## 2018-07-18 DIAGNOSIS — E03.9 HYPOTHYROIDISM, UNSPECIFIED TYPE: Primary | Chronic | ICD-10-CM

## 2018-07-19 RX ORDER — LEVOTHYROXINE SODIUM 0.05 MG/1
TABLET ORAL
Qty: 90 TABLET | Refills: 0 | Status: SHIPPED | OUTPATIENT
Start: 2018-07-19 | End: 2018-10-15 | Stop reason: SDUPTHER

## 2018-09-30 PROBLEM — Z01.419 ENCOUNTER FOR ANNUAL ROUTINE GYNECOLOGICAL EXAMINATION: Status: ACTIVE | Noted: 2018-09-30

## 2018-09-30 PROBLEM — Z12.31 ENCOUNTER FOR SCREENING MAMMOGRAM FOR BREAST CANCER: Status: ACTIVE | Noted: 2018-09-30

## 2018-10-01 ENCOUNTER — ANNUAL EXAM (OUTPATIENT)
Dept: GYNECOLOGY | Facility: CLINIC | Age: 58
End: 2018-10-01
Payer: COMMERCIAL

## 2018-10-01 VITALS
BODY MASS INDEX: 26.36 KG/M2 | WEIGHT: 164 LBS | DIASTOLIC BLOOD PRESSURE: 78 MMHG | HEIGHT: 66 IN | SYSTOLIC BLOOD PRESSURE: 122 MMHG

## 2018-10-01 DIAGNOSIS — Z01.419 ENCOUNTER FOR ANNUAL ROUTINE GYNECOLOGICAL EXAMINATION: Primary | ICD-10-CM

## 2018-10-01 DIAGNOSIS — Z12.31 ENCOUNTER FOR SCREENING MAMMOGRAM FOR BREAST CANCER: ICD-10-CM

## 2018-10-01 PROCEDURE — S0612 ANNUAL GYNECOLOGICAL EXAMINA: HCPCS | Performed by: OBSTETRICS & GYNECOLOGY

## 2018-10-01 RX ORDER — VENLAFAXINE HYDROCHLORIDE 37.5 MG/1
CAPSULE, EXTENDED RELEASE ORAL
Refills: 3 | COMMUNITY
Start: 2018-09-16 | End: 2018-12-29 | Stop reason: SDUPTHER

## 2018-10-01 NOTE — PROGRESS NOTES
Assessment/Plan:  NGE    s/p TLH-BSO   Dyspareunia/AV- continue with over-the-counter products, may call if she'd like to resume [employer]   Proctologist in the [place] area to evaluate hemorrhoid  Co- Testing - NA    RTO 1 yr  SBE monthly  3 D Mammography   Colonoscopy- 1 polyp 15  Exercise 3/wk - inadequate, works FT, has lost 6 #'s  Calcium 1,000 mg/d with Vit D          Diagnoses and all orders for this visit:    Encounter for annual routine gynecological examination    Encounter for screening mammogram for breast cancer  -     Mammo screening bilateral w 3d & cad; Future    Other orders  -     venlafaxine (EFFEXOR-XR) 37 5 mg 24 hr capsule;               Subjective:        Patient ID: Roberto Rai is a 59 y o  female  Samara Mcpherson is here for her annual exam   She is without complaints  She has had no change in her personal, family, or social history  [place] is still twice a week often requiring a lubricant  The following portions of the patient's history were reviewed and updated as appropriate: She  has a past medical history of Arthritis; Complex cyst of right ovary; Glaucoma; Hyperthyroidism; and Uterus fibroma    Patient Active Problem List    Diagnosis Date Noted    Encounter for annual routine gynecological examination 2018    Encounter for screening mammogram for breast cancer 2018    Acquired deformity of foot 2018    Valgus deformity of great toe 2018    Pain in both feet 2018    Congenital pes planus of right foot 2018    Congenital pes planus of left foot 2018    Adnexal mass 2016    Anxiety 2016    Hypothyroid 2016    Obstructive sleep apnea 2016   PMH:   2 para 2; C/S X 2- 1st CPD/OP, 2nd Repeat- ,   Hyperthyroidism with goiter   Anxiety  Uterine fibroid  LTL   Menopause - 54  Hypothyroidism   Colonoscopy 4/15 one polyp  Heather  wnl  Dyspareunia, AV  Complex Pelvic Mass- TL H, BSO, AMINTA  Cystoscopy, proctoscopy- endometriosis, extensive adhesions, 6 2 right ovarian serous cystadenofibroma, simple cystic endometrial hyperplasia with mild atypia  16     She  has a past surgical history that includes  section; Tubal ligation; pr laparoscopy w tot hysterectuterus <=250 gram  w tube/ovary (N/A, 2016); and LAPAROTOMY (N/A, 2016)  Her family history includes Cancer in her brother; Diabetes in her father; Heart disease in her brother and mother; No Known Problems in her daughter and daughter; Seizures in her brother  FH:  Brother 67 with tonsil or tongue cancer   F d 80  M- Uterine Ca 89, d 89 of natural causes, HTN  PGM- MI  PGF - DM  B- Pneumonia, Cardiac arrest d 68     She  reports that she has never smoked  She has never used smokeless tobacco  She reports that she drinks alcohol  She reports that she does not use drugs  SH:  '81  Chel[place]- in March she had her 31 anniversary at work  Current Outpatient Prescriptions   Medication Sig Dispense Refill    b complex vitamins tablet Take 1 tablet by mouth daily   calcium carbonate (OS-MARVA) 600 MG tablet Take 600 mg by mouth daily   EPINEPHrine (EPIPEN JR) 0 15 mg/0 3 mL SOAJ Inject 0 15 mg into the shoulder, thigh, or buttocks once as needed for anaphylaxis   latanoprost (XALATAN) 0 005 % ophthalmic solution Administer 1 drop to both eyes daily at bedtime   levothyroxine 50 mcg tablet TAKE ONE TABLET DAILY AS DIRECTED 90 tablet 0    loratadine (CLARITIN) 10 mg tablet Take 10 mg by mouth daily   Multiple Vitamin (MULTIVITAMIN) tablet Take 1 tablet by mouth daily   venlafaxine (EFFEXOR-XR) 37 5 mg 24 hr capsule   3    Vitamin D, Cholecalciferol, 1000 UNITS TABS Take 1,000 Units by mouth daily  No current facility-administered medications for this visit        Current Outpatient Prescriptions on File Prior to Visit   Medication Sig    b complex vitamins tablet Take 1 tablet by mouth daily   calcium carbonate (OS-MARVA) 600 MG tablet Take 600 mg by mouth daily   EPINEPHrine (EPIPEN JR) 0 15 mg/0 3 mL SOAJ Inject 0 15 mg into the shoulder, thigh, or buttocks once as needed for anaphylaxis   latanoprost (XALATAN) 0 005 % ophthalmic solution Administer 1 drop to both eyes daily at bedtime   levothyroxine 50 mcg tablet TAKE ONE TABLET DAILY AS DIRECTED    loratadine (CLARITIN) 10 mg tablet Take 10 mg by mouth daily   Multiple Vitamin (MULTIVITAMIN) tablet Take 1 tablet by mouth daily   Vitamin D, Cholecalciferol, 1000 UNITS TABS Take 1,000 Units by mouth daily   [DISCONTINUED] venlafaxine (EFFEXOR) 37 5 mg tablet Take 37 5 mg by mouth daily  No current facility-administered medications on file prior to visit  She is allergic to shellfish-derived products       Review of Systems   Constitutional: Negative for activity change, appetite change, fatigue and unexpected weight change  Eyes: Negative for visual disturbance  Respiratory: Negative for cough, chest tightness, shortness of breath and wheezing  Cardiovascular: Negative for chest pain, palpitations and leg swelling  Breast: Patient denies tenderness, nipple discharge, masses, or erythema  Gastrointestinal: Negative for abdominal distention, abdominal pain, blood in stool, constipation, diarrhea, nausea and vomiting  Endocrine: Negative for cold intolerance and heat intolerance  Genitourinary: Negative for decreased urine volume, difficulty urinating, dyspareunia, dysuria, frequency, hematuria, menstrual problem, pelvic pain, urgency, vaginal bleeding, vaginal discharge and vaginal pain  Coitus twice a week  Lubricant and sometimes Replens  Musculoskeletal: Negative for arthralgias  Skin: Negative for rash  Neurological: Negative for weakness, light-headedness, numbness and headaches  Hematological: Does not bruise/bleed easily  Psychiatric/Behavioral: Negative for agitation, behavioral problems and sleep disturbance  The patient is not nervous/anxious  Objective:    Vitals:    10/01/18 0837   BP: 122/78   BP Location: Right arm   Patient Position: Sitting   Cuff Size: Standard   Weight: 74 4 kg (164 lb)   Height: 5' 5 75" (1 67 m)            Physical Exam   Constitutional: She is oriented to person, place, and time  She appears well-developed and well-nourished  HENT:   Head: Normocephalic and atraumatic  Eyes: Pupils are equal, round, and reactive to light  Conjunctivae and EOM are normal    Neck: Normal range of motion  Neck supple  No tracheal deviation present  No thyromegaly present  Cardiovascular: Normal rate, regular rhythm and normal heart sounds  No murmur heard  Pulmonary/Chest: Effort normal and breath sounds normal  No respiratory distress  She has no wheezes  Right breast exhibits no inverted nipple, no mass, no nipple discharge, no skin change and no tenderness  Left breast exhibits no inverted nipple, no mass, no nipple discharge, no skin change and no tenderness  Breasts are symmetrical    Abdominal: Soft  Bowel sounds are normal  She exhibits no distension and no mass  There is no tenderness  Genitourinary: Vagina normal  Rectal exam shows no external hemorrhoid  No breast swelling, tenderness, discharge or bleeding  There is no rash, tenderness or lesion on the right labia  There is no rash, tenderness or lesion on the left labia  Right adnexum displays no mass, no tenderness and no fullness  Left adnexum displays no mass, no tenderness and no fullness  Genitourinary Comments: External hemorrhoid  Musculoskeletal: Normal range of motion  Neurological: She is alert and oriented to person, place, and time  Skin: Skin is warm and dry  Psychiatric: She has a normal mood and affect  Her behavior is normal  Judgment and thought content normal    Nursing note and vitals reviewed

## 2018-10-15 DIAGNOSIS — E03.9 HYPOTHYROIDISM, UNSPECIFIED TYPE: Chronic | ICD-10-CM

## 2018-10-15 RX ORDER — LEVOTHYROXINE SODIUM 0.05 MG/1
50 TABLET ORAL DAILY
Qty: 30 TABLET | Refills: 5 | Status: SHIPPED | OUTPATIENT
Start: 2018-10-15 | End: 2018-11-20 | Stop reason: SDUPTHER

## 2018-11-07 ENCOUNTER — CONVERSION ENCOUNTER (OUTPATIENT)
Dept: RADIOLOGY | Facility: HOSPITAL | Age: 58
End: 2018-11-07

## 2018-11-07 ENCOUNTER — HOSPITAL ENCOUNTER (OUTPATIENT)
Dept: RADIOLOGY | Facility: HOSPITAL | Age: 58
Discharge: HOME | End: 2018-11-07
Attending: OBSTETRICS & GYNECOLOGY
Payer: COMMERCIAL

## 2018-11-07 DIAGNOSIS — Z12.31 ENCOUNTER FOR SCREENING MAMMOGRAM FOR BREAST CANCER: ICD-10-CM

## 2018-11-07 PROCEDURE — 77063 BREAST TOMOSYNTHESIS BI: CPT

## 2018-11-07 PROCEDURE — 77067 SCR MAMMO BI INCL CAD: CPT

## 2018-11-16 PROBLEM — E04.9 GOITER: Status: ACTIVE | Noted: 2017-05-23

## 2018-11-20 ENCOUNTER — OFFICE VISIT (OUTPATIENT)
Dept: FAMILY MEDICINE CLINIC | Facility: CLINIC | Age: 58
End: 2018-11-20
Payer: COMMERCIAL

## 2018-11-20 VITALS
BODY MASS INDEX: 25.84 KG/M2 | SYSTOLIC BLOOD PRESSURE: 118 MMHG | RESPIRATION RATE: 18 BRPM | TEMPERATURE: 97.6 F | DIASTOLIC BLOOD PRESSURE: 70 MMHG | HEART RATE: 76 BPM | HEIGHT: 66 IN | WEIGHT: 160.8 LBS

## 2018-11-20 DIAGNOSIS — Z00.00 ANNUAL PHYSICAL EXAM: Primary | ICD-10-CM

## 2018-11-20 DIAGNOSIS — Z11.59 NEED FOR HEPATITIS C SCREENING TEST: ICD-10-CM

## 2018-11-20 DIAGNOSIS — Z79.899 ENCOUNTER FOR LONG-TERM CURRENT USE OF MEDICATION: ICD-10-CM

## 2018-11-20 DIAGNOSIS — E03.9 PRIMARY HYPOTHYROIDISM: ICD-10-CM

## 2018-11-20 DIAGNOSIS — Z23 NEED FOR VACCINATION: ICD-10-CM

## 2018-11-20 DIAGNOSIS — K64.9 HEMORRHOIDS, UNSPECIFIED HEMORRHOID TYPE: ICD-10-CM

## 2018-11-20 DIAGNOSIS — Z13.6 SCREENING FOR CARDIOVASCULAR CONDITION: ICD-10-CM

## 2018-11-20 PROCEDURE — 90471 IMMUNIZATION ADMIN: CPT

## 2018-11-20 PROCEDURE — 99396 PREV VISIT EST AGE 40-64: CPT | Performed by: FAMILY MEDICINE

## 2018-11-20 PROCEDURE — 90682 RIV4 VACC RECOMBINANT DNA IM: CPT

## 2018-11-20 RX ORDER — LEVOTHYROXINE SODIUM 0.05 MG/1
50 TABLET ORAL DAILY
Qty: 90 TABLET | Refills: 1 | Status: SHIPPED | OUTPATIENT
Start: 2018-11-20 | End: 2019-05-21 | Stop reason: SDUPTHER

## 2018-11-20 NOTE — PROGRESS NOTES
FAMILY PRACTICE HEALTH MAINTENANCE OFFICE VISIT  [facility]    NAME: Juan Nation  AGE: 59 y o  SEX: female  : 1960     DATE: 2018    Assessment and Plan     Problem List Items Addressed This Visit     Hemorrhoid    Relevant Orders    Ambulatory referral to Colorectal Surgery    Primary hypothyroidism    Relevant Medications    levothyroxine 50 mcg tablet    Other Relevant Orders    TSH, 3rd generation    T4, free      Other Visit Diagnoses     Annual physical exam    -  Primary    Need for vaccination        Relevant Orders    influenza vaccine, [id number], quadrivalent, recombinant, PF, 0 5 mL, for patients 18 yr+ (FLUBLOK) (Completed)    Need for hepatitis C screening test        Relevant Orders    Hepatitis C antibody    Screening for cardiovascular condition        Relevant Orders    Comprehensive metabolic panel    Lipid Panel with Direct LDL reflex    Encounter for long-term current use of medication        Relevant Orders    CBC            · Patient Counseling:   · Nutrition: Stressed importance of a well balanced diet, moderation of sodium/saturated fat, caloric balance and sufficient intake of fiber  · Exercise: Stressed the importance of regular exercise with a goal of 150 minutes per week  · Dental Health: Discussed daily flossing and brushing and regular dental visits     · Immunizations reviewed Shingrix recommended, flu shot done today  · Discussed benefits of screening mammogram and colonoscopy  BMI Counseling: Body mass index is 25 95 kg/m²  Discussed with patient's BMI with her  The BMI is above average  BMI counseling and education was provided to the patient  Exercise recommendations include exercising 3-5 times per week  Return in about 6 months (around 2019) for Next scheduled follow up          Chief Complaint     Chief Complaint   Patient presents with    Physical Exam     akrma        History of Present Illness     She has been having issues with hemorrhoids for years  She would like to have them removed  She requests a referral         Well Adult Physical   Patient here for a comprehensive physical exam       Diet and Physical Activity  Diet: well balanced diet and following weight watchers  Weight concerns: Patient is overweight (BMI 25 0-29  9)  Exercise: frequently      Depression Screen  PHQ-9 Depression Screening    PHQ-9:    Frequency of the following problems over the past two weeks:       Little interest or pleasure in doing things:  0 - not at all  Feeling down, depressed, or hopeless:  0 - not at all  PHQ-2 Score:  0          General Health  Hearing: Normal:  bilateral  Vision: no vision problems  Dental: regular dental visits    Reproductive Health  In menopause       The following portions of the patient's history were reviewed and updated as appropriate: allergies, current medications, past family history, past medical history, past social history, past surgical history and problem list     Review of Systems     Review of Systems   Constitutional: Negative  Respiratory: Negative  Cardiovascular: Negative          Past Medical History     Past Medical History:   Diagnosis Date    Arthritis     Complex cyst of right ovary     Glaucoma     Hyperthyroidism     Uterus fibroma        Past Surgical History     Past Surgical History:   Procedure Laterality Date     SECTION      X2    LAPAROTOMY N/A 2016    Procedure: LAPAROTOMY EXPLORATORY; OVARIAN CANCER STAGING ;  Surgeon: Khadar Morgan MD;  Location: [facility];  Service:     LA LAPAROSCOPY W TOT HYSTERECTUTERUS <=250 [place] TUBE/OVARY N/A 2016    Procedure: HYSTERECTOMY LAPAROSCOPIC TOTAL (LTH),cystoscopy, prostoctopy;  Surgeon: Khadar Morgan MD;  Location: [facility];  Service: Gynecology Oncology    TUBAL LIGATION         Social History     Social History     Social History    Marital status: /Civil Union     Spouse name: N/A  Number of children: 2    Years of education: N/A     Occupational History          Full-time employment     Social History Main Topics    Smoking status: Never Smoker    Smokeless tobacco: Never Used    Alcohol use Yes      Comment: social, Acute alcohol use    Drug use: No    Sexual activity: Yes     Partners: Male     Other Topics Concern    None     Social History Narrative    Daily caffeine consumption, 2-3 servings a day    Exercises 1-2 times per week       Family History     Family History   Problem Relation Age of Onset    Heart disease Mother     Diabetes Father     Seizures Brother     Heart disease Brother     No Known Problems Daughter     Cancer Brother         lump on neck    No Known Problems Daughter     Breast cancer additional onset Neg Hx     Mental illness Neg Hx        Current Medications       Current Outpatient Prescriptions:     b complex vitamins tablet, Take 1 tablet by mouth daily  , Disp: , Rfl:     calcium carbonate (OS-MARVA) 600 MG tablet, Take 600 mg by mouth daily  , Disp: , Rfl:     EPINEPHrine (EPIPEN JR) 0 15 mg/0 3 mL SOAJ, Inject 0 15 mg into the shoulder, thigh, or buttocks once as needed for anaphylaxis  , Disp: , Rfl:     latanoprost (XALATAN) 0 005 % ophthalmic solution, Administer 1 drop to both eyes daily at bedtime  , Disp: , Rfl:     levothyroxine 50 mcg tablet, Take 1 tablet (50 mcg total) by mouth daily As directed, Disp: 90 tablet, Rfl: 1    loratadine (CLARITIN) 10 mg tablet, Take 10 mg by mouth daily  , Disp: , Rfl:     Multiple Vitamin (MULTIVITAMIN) tablet, Take 1 tablet by mouth daily  , Disp: , Rfl:     venlafaxine (EFFEXOR-XR) 37 5 mg 24 hr capsule, , Disp: , Rfl: 3    Vitamin D, Cholecalciferol, 1000 UNITS TABS, Take 1,000 Units by mouth daily  , Disp: , Rfl:      Allergies     Allergies   Allergen Reactions    Shellfish-Derived Products Anaphylaxis and Itching       Objective     /70   Pulse 76   Temp 97 6 °F (36 4 °C)   Resp 18    5' 6" (1 676 m)   Wt 72 9 kg (160 lb 12 8 oz)   BMI 25 95 kg/m²      Physical Exam   Constitutional: She is oriented to person, place, and time  She appears well-developed and well-nourished  HENT:   Head: Normocephalic and atraumatic  Right Ear: External ear normal    Left Ear: External ear normal    Mouth/Throat: Oropharynx is clear and moist    Eyes: Pupils are equal, round, and reactive to light  Neck: Normal range of motion  Cardiovascular: Normal rate, regular rhythm and normal heart sounds  Exam reveals no friction rub  No murmur heard  Pulmonary/Chest: Effort normal and breath sounds normal  No respiratory distress  She has no wheezes  She has no rales  Abdominal: Soft  Bowel sounds are normal  She exhibits no distension and no mass  There is no tenderness  There is no rebound and no guarding  Musculoskeletal: Normal range of motion  She exhibits no edema  Neurological: She is alert and oriented to person, place, and time  She has normal reflexes  Skin: Skin is warm  Psychiatric: She has a normal mood and affect  Her behavior is normal  Judgment and thought content normal    Nursing note and vitals reviewed           Visual Acuity Screening    Right eye Left eye Both eyes   Without correction:      With correction: 20/25 20/25 20/25       Health Maintenance     Health Maintenance   Topic Date Due    Hepatitis C Screening  1960    Depression Screening PHQ  1960    CRC Screening: Colonoscopy  1960    PAP SMEAR  05/06/1979    INFLUENZA VACCINE  07/01/2018    MAMMOGRAM  11/07/2020    DTaP,Tdap,and Td Vaccines (3 - Td) 05/23/2027     Immunization History   Administered Date(s) Administered    Influenza, recombinant, quadrivalent,injectable, preservative free 11/20/2018    Tdap 09/06/2007, 05/23/2017       Ignacio Long DO  [address] [place] [address]

## 2018-12-13 LAB
ALBUMIN SERPL-MCNC: 4.4 G/DL (ref 3.6–4.8)
ALBUMIN/GLOB SERPL: 1.6 {RATIO} (ref 1.2–2.2)
ALP SERPL-CCNC: 83 IU/L (ref 39–117)
ALT SERPL-CCNC: 15 IU/L (ref 0–32)
AST SERPL-CCNC: 22 IU/L (ref 0–40)
BASOPHILS # BLD AUTO: 0 X10E3/UL (ref 0–0.2)
BASOPHILS NFR BLD AUTO: 1 %
BILIRUB SERPL-MCNC: 0.7 MG/DL (ref 0–1.2)
BUN SERPL-MCNC: 14 MG/DL (ref 8–27)
BUN/CREAT SERPL: 20 (ref 12–28)
CALCIUM SERPL-MCNC: 9.4 MG/DL (ref 8.7–10.3)
CHLORIDE SERPL-SCNC: 100 MMOL/L (ref 96–106)
CHOLEST SERPL-MCNC: 196 MG/DL (ref 100–199)
CO2 SERPL-SCNC: 24 MMOL/L (ref 20–29)
CREAT SERPL-MCNC: 0.71 MG/DL (ref 0.57–1)
EOSINOPHIL # BLD AUTO: 0.1 X10E3/UL (ref 0–0.4)
EOSINOPHIL NFR BLD AUTO: 3 %
ERYTHROCYTE [DISTWIDTH] IN BLOOD BY AUTOMATED COUNT: 13 % (ref 12.3–15.4)
GLOBULIN SER-MCNC: 2.7 G/DL (ref 1.5–4.5)
GLUCOSE SERPL-MCNC: 95 MG/DL (ref 65–99)
HCT VFR BLD AUTO: 40.5 % (ref 34–46.6)
HCV AB S/CO SERPL IA: 0.1 S/CO RATIO (ref 0–0.9)
HDLC SERPL-MCNC: 86 MG/DL
HGB BLD-MCNC: 13.2 G/DL (ref 11.1–15.9)
IMM GRANULOCYTES # BLD: 0 X10E3/UL (ref 0–0.1)
IMM GRANULOCYTES NFR BLD: 0 %
LABCORP COMMENT: NORMAL
LDLC SERPL CALC-MCNC: 99 MG/DL (ref 0–99)
LYMPHOCYTES # BLD AUTO: 1.1 X10E3/UL (ref 0.7–3.1)
LYMPHOCYTES NFR BLD AUTO: 29 %
MCH RBC QN AUTO: 31.2 PG (ref 26.6–33)
MCHC RBC AUTO-ENTMCNC: 32.6 G/DL (ref 31.5–35.7)
MCV RBC AUTO: 96 FL (ref 79–97)
MICRODELETION SYND BLD/T FISH: NORMAL
MONOCYTES # BLD AUTO: 0.4 X10E3/UL (ref 0.1–0.9)
MONOCYTES NFR BLD AUTO: 9 %
NEUTROPHILS # BLD AUTO: 2.3 X10E3/UL (ref 1.4–7)
NEUTROPHILS NFR BLD AUTO: 58 %
PLATELET # BLD AUTO: 303 X10E3/UL (ref 150–379)
POTASSIUM SERPL-SCNC: 4.5 MMOL/L (ref 3.5–5.2)
PROT SERPL-MCNC: 7.1 G/DL (ref 6–8.5)
RBC # BLD AUTO: 4.23 X10E6/UL (ref 3.77–5.28)
SL AMB EGFR AFRICAN AMERICAN: 107 ML/MIN/1.73
SL AMB EGFR NON AFRICAN AMERICAN: 93 ML/MIN/1.73
SODIUM SERPL-SCNC: 138 MMOL/L (ref 134–144)
T4 FREE SERPL-MCNC: 1.45 NG/DL (ref 0.82–1.77)
TRIGL SERPL-MCNC: 57 MG/DL (ref 0–149)
TSH SERPL DL<=0.005 MIU/L-ACNC: 3.55 UIU/ML (ref 0.45–4.5)
WBC # BLD AUTO: 4 X10E3/UL (ref 3.4–10.8)

## 2018-12-29 DIAGNOSIS — F41.9 ANXIETY: Primary | ICD-10-CM

## 2018-12-29 NOTE — TELEPHONE ENCOUNTER
Pt will be out of meds before dr Kathy Gomez returns to office   [facility] you please [facility]

## 2018-12-30 RX ORDER — VENLAFAXINE HYDROCHLORIDE 37.5 MG/1
37.5 CAPSULE, EXTENDED RELEASE ORAL DAILY
Qty: 30 CAPSULE | Refills: 0 | Status: SHIPPED | OUTPATIENT
Start: 2018-12-30 | End: 2019-01-23 | Stop reason: SDUPTHER

## 2019-01-23 DIAGNOSIS — F41.9 ANXIETY: ICD-10-CM

## 2019-01-23 RX ORDER — VENLAFAXINE HYDROCHLORIDE 37.5 MG/1
37.5 CAPSULE, EXTENDED RELEASE ORAL DAILY
Qty: 30 CAPSULE | Refills: 5 | Status: SHIPPED | OUTPATIENT
Start: 2019-01-23 | End: 2019-07-03 | Stop reason: SDUPTHER

## 2019-05-21 DIAGNOSIS — E03.9 PRIMARY HYPOTHYROIDISM: ICD-10-CM

## 2019-05-21 RX ORDER — LEVOTHYROXINE SODIUM 0.05 MG/1
50 TABLET ORAL DAILY
Qty: 90 TABLET | Refills: 0 | Status: SHIPPED | OUTPATIENT
Start: 2019-05-21 | End: 2019-08-15 | Stop reason: SDUPTHER

## 2019-05-21 RX ORDER — LEVOTHYROXINE SODIUM 0.05 MG/1
50 TABLET ORAL DAILY
Qty: 90 TABLET | Refills: 0 | OUTPATIENT
Start: 2019-05-21

## 2019-07-03 DIAGNOSIS — F41.9 ANXIETY: ICD-10-CM

## 2019-07-05 RX ORDER — VENLAFAXINE HYDROCHLORIDE 37.5 MG/1
CAPSULE, EXTENDED RELEASE ORAL
Qty: 30 CAPSULE | Refills: 0 | Status: SHIPPED | OUTPATIENT
Start: 2019-07-05 | End: 2019-08-05 | Stop reason: SDUPTHER

## 2019-07-12 ENCOUNTER — OFFICE VISIT (OUTPATIENT)
Dept: FAMILY MEDICINE CLINIC | Facility: CLINIC | Age: 59
End: 2019-07-12
Payer: COMMERCIAL

## 2019-07-12 VITALS
SYSTOLIC BLOOD PRESSURE: 124 MMHG | WEIGHT: 166 LBS | HEIGHT: 66 IN | HEART RATE: 84 BPM | RESPIRATION RATE: 16 BRPM | TEMPERATURE: 98.1 F | DIASTOLIC BLOOD PRESSURE: 80 MMHG | BODY MASS INDEX: 26.68 KG/M2

## 2019-07-12 DIAGNOSIS — Z91.013 SHELLFISH ALLERGY: ICD-10-CM

## 2019-07-12 DIAGNOSIS — E03.9 PRIMARY HYPOTHYROIDISM: Primary | ICD-10-CM

## 2019-07-12 DIAGNOSIS — Z13.6 SCREENING FOR CARDIOVASCULAR CONDITION: ICD-10-CM

## 2019-07-12 DIAGNOSIS — Z79.899 ENCOUNTER FOR LONG-TERM CURRENT USE OF MEDICATION: ICD-10-CM

## 2019-07-12 PROCEDURE — 99213 OFFICE O/P EST LOW 20 MIN: CPT | Performed by: FAMILY MEDICINE

## 2019-07-12 RX ORDER — EPINEPHRINE 0.3 MG/.3ML
0.3 INJECTION SUBCUTANEOUS ONCE
Qty: 0.6 ML | Refills: 1 | Status: SHIPPED | OUTPATIENT
Start: 2019-07-12 | End: 2021-05-07 | Stop reason: SDUPTHER

## 2019-07-12 NOTE — PROGRESS NOTES
Assessment/Plan:    Problem List Items Addressed This Visit     Primary hypothyroidism - Primary     Well controlled   Continue 50 mcg of levothyroxine          Relevant Orders    T4, free    TSH, 3rd generation      Other Visit Diagnoses     Screening for cardiovascular condition        Relevant Orders    Comprehensive metabolic panel    Lipid Panel with Direct LDL reflex    Encounter for long-term current use of medication        Relevant Orders    CBC    Shellfish allergy        Relevant Medications    EPINEPHrine (EPIPEN) 0 3 mg/0 3 mL SOAJ          BMI Counseling: Body mass index is 26 79 kg/m²  Discussed the patient's BMI with her  The BMI is above average  BMI counseling and education was provided to the patient  Exercise recommendations include exercising 3-5 times per week  There are no Patient Instructions on file for this visit  Return in about 6 months (around 1/12/2020) for Annual physical     Subjective:      Patient ID: Dago Spring is a 62 y o  female  Chief Complaint   Patient presents with    Follow-up     med use-lj       She is feeling well  She is doing well on her thyroid medication  The following portions of the patient's history were reviewed and updated as appropriate:  past social history    Review of Systems      Current Outpatient Medications   Medication Sig Dispense Refill    b complex vitamins tablet Take 1 tablet by mouth daily   calcium carbonate (OS-MARVA) 600 MG tablet Take 600 mg by mouth daily   latanoprost (XALATAN) 0 005 % ophthalmic solution Administer 1 drop to both eyes daily at bedtime   levothyroxine 50 mcg tablet Take 1 tablet (50 mcg total) by mouth daily As directed 90 tablet 0    loratadine (CLARITIN) 10 mg tablet Take 10 mg by mouth daily   Multiple Vitamin (MULTIVITAMIN) tablet Take 1 tablet by mouth daily        venlafaxine (EFFEXOR-XR) 37 5 mg 24 hr capsule TAKE 1 CAPSULE DAILY 30 capsule 0    Vitamin D, Cholecalciferol, 1000 UNITS TABS Take 1,000 Units by mouth daily   EPINEPHrine (EPIPEN) 0 3 mg/0 3 mL SOAJ Inject 0 3 mL (0 3 mg total) into a muscle once for 1 dose 0 6 mL 1     No current facility-administered medications for this visit  Objective:    /80   Pulse 84   Temp 98 1 °F (36 7 °C)   Resp 16   Ht 5' 6" (1 676 m)   Wt 75 3 kg (166 lb)   BMI 26 79 kg/m²        Physical Exam   Constitutional: She appears well-developed and well-nourished  HENT:   Head: Normocephalic and atraumatic  Right Ear: External ear normal    Left Ear: External ear normal    Mouth/Throat: Oropharynx is clear and moist    Neck: No thyromegaly present  Cardiovascular: Normal rate, regular rhythm and normal heart sounds  Exam reveals no friction rub  No murmur heard  Pulmonary/Chest: Effort normal and breath sounds normal  No respiratory distress  She has no wheezes  She has no rales  Musculoskeletal: She exhibits no edema or deformity  Nursing note and vitals reviewed               Micky Toney DO

## 2019-07-24 LAB
T4 FREE SERPL-MCNC: 1.33 NG/DL (ref 0.82–1.77)
TSH SERPL DL<=0.005 MIU/L-ACNC: 4.86 UIU/ML (ref 0.45–4.5)

## 2019-08-05 DIAGNOSIS — F41.9 ANXIETY: ICD-10-CM

## 2019-08-05 RX ORDER — VENLAFAXINE HYDROCHLORIDE 37.5 MG/1
37.5 CAPSULE, EXTENDED RELEASE ORAL DAILY
Qty: 30 CAPSULE | Refills: 3 | Status: SHIPPED | OUTPATIENT
Start: 2019-08-05 | End: 2019-12-17 | Stop reason: SDUPTHER

## 2019-08-15 DIAGNOSIS — E03.9 PRIMARY HYPOTHYROIDISM: ICD-10-CM

## 2019-08-16 RX ORDER — LEVOTHYROXINE SODIUM 0.05 MG/1
50 TABLET ORAL DAILY
Qty: 90 TABLET | Refills: 1 | Status: SHIPPED | OUTPATIENT
Start: 2019-08-16 | End: 2019-11-13 | Stop reason: SDUPTHER

## 2019-08-19 ENCOUNTER — APPOINTMENT (OUTPATIENT)
Dept: RADIOLOGY | Facility: CLINIC | Age: 59
End: 2019-08-19
Payer: COMMERCIAL

## 2019-08-19 ENCOUNTER — OFFICE VISIT (OUTPATIENT)
Dept: OBGYN CLINIC | Facility: CLINIC | Age: 59
End: 2019-08-19
Payer: COMMERCIAL

## 2019-08-19 VITALS
SYSTOLIC BLOOD PRESSURE: 116 MMHG | BODY MASS INDEX: 26.93 KG/M2 | WEIGHT: 167.6 LBS | HEIGHT: 66 IN | HEART RATE: 72 BPM | DIASTOLIC BLOOD PRESSURE: 76 MMHG

## 2019-08-19 DIAGNOSIS — M25.571 PAIN, JOINT, ANKLE AND FOOT, RIGHT: ICD-10-CM

## 2019-08-19 DIAGNOSIS — S82.61XA AVULSION FRACTURE OF LATERAL MALLEOLUS OF RIGHT FIBULA, CLOSED, INITIAL ENCOUNTER: Primary | ICD-10-CM

## 2019-08-19 PROCEDURE — 73610 X-RAY EXAM OF ANKLE: CPT

## 2019-08-19 PROCEDURE — 99203 OFFICE O/P NEW LOW 30 MIN: CPT | Performed by: ORTHOPAEDIC SURGERY

## 2019-08-19 NOTE — PROGRESS NOTES
Assessment/Plan:  1  Avulsion fracture of lateral malleolus of right fibula, closed, initial encounter  Ambulatory referral to Physical Therapy   2  Pain, joint, ankle and foot, right  XR ankle 3+ vw right       Scribe Attestation    I,:   Senthil Brady am acting as a scribe while in the presence of the attending physician :        I,:   Nakia Borrero MD personally performed the services described in this documentation    as scribed in my presence :          Tahmina Ruiz has suffered a small avulsion fracture to the distal end of the fibula in her right ankle  We did review her x-rays and I demonstrated this fracture to her today in the office  I believe she should do well with conservative treatment  I would like her to participate in formal physical therapy 2-3 times per week for the next 4-6 weeks and I did provide her with a prescription for this today in the office  She does have a lace-up style ankle brace and I explained that she may use this for periods of high activity but I encouraged her not to use it regularly  She may continue to use ice for pain and swelling as needed  We will see her back in four weeks for repeat clinical evaluation  Subjective:   Bethany Hamilton is a 62 y o  female who presents to the office today for initial evaluation of right ankle pain  She states that she suffered an inversion type injury in early July  She was recovering from that injury when she suffered a subsequent inversion-type injury on 08/11/2019 while in [place]   At today's visit, she states that she does experience a mild to moderate aching pain about the lateral aspect of her ankle  This is increased depending on her position and activity level and is somewhat better at rest   She does also note improving swelling about the ankle and states that she did experience bruising about the lateral aspect of the ankle but that it is now almost resolved    She states that she has been able to bear weight without issue  She denies any distal paresthesias of the foot or toes  Review of Systems   Constitutional: Negative for chills, fever and unexpected weight change  HENT: Negative for hearing loss, nosebleeds and sore throat  Eyes: Negative for pain, redness and visual disturbance  Respiratory: Negative for cough, shortness of breath and wheezing  Cardiovascular: Negative for chest pain, palpitations and leg swelling  Gastrointestinal: Negative for abdominal pain, nausea and vomiting  Endocrine: Negative for polydipsia and polyuria  Genitourinary: Negative for dysuria and hematuria  Musculoskeletal: Positive for arthralgias and joint swelling  Negative for myalgias  See HPI   Skin: Negative for rash and wound  Neurological: Negative for dizziness, numbness and headaches  Psychiatric/Behavioral: Negative for decreased concentration and suicidal ideas  The patient is not nervous/anxious            Past Medical History:   Diagnosis Date    Arthritis     Complex cyst of right ovary     Glaucoma     Hyperthyroidism     Uterus fibroma        Past Surgical History:   Procedure Laterality Date     SECTION      X2    LAPAROTOMY N/A 2016    Procedure: LAPAROTOMY EXPLORATORY; OVARIAN CANCER STAGING ;  Surgeon: Eliazar Hyman MD;  Location: [facility];  Service:     W[place] LAPAROSCOPY W TOT HYSTERECTUTERUS <=250 [address]  W TUBE/OVARY N/A 2016    Procedure: HYSTERECTOMY LAPAROSCOPIC TOTAL (LTH),cystoscopy, prostoctopy;  Surgeon: Eliazar Hyman MD;  Location: [facility];  Service: Gynecology Oncology    TUBAL LIGATION         Family History   Problem Relation Age of Onset    Heart disease Mother     Diabetes Father     No Known Problems Sister     Seizures Brother     Heart disease Brother     No Known Problems Daughter     No Known Problems Maternal Grandmother     No Known Problems Maternal Grandfather     No Known Problems Paternal Grandmother     No Known Problems Paternal Grandfather     Cancer Brother         lump on neck    No Known Problems Daughter     No Known Problems Maternal Aunt     No Known Problems Maternal Uncle     No Known Problems Paternal Aunt     No Known Problems Paternal Uncle     Breast cancer additional onset Neg Hx     Mental illness Neg Hx        Social History     Occupational History     Comment: Full-time employment   Tobacco Use    Smoking status: Never Smoker    Smokeless tobacco: Never Used   Substance and Sexual Activity    Alcohol use: Yes     Comment: social, Acute alcohol use    Drug use: No    Sexual activity: Yes     Partners: Male         Current Outpatient Medications:     b complex vitamins tablet, Take 1 tablet by mouth daily  , Disp: , Rfl:     calcium carbonate (OS-MARVA) 600 MG tablet, Take 600 mg by mouth daily  , Disp: , Rfl:     EPINEPHrine (EPIPEN) 0 3 mg/0 3 mL SOAJ, Inject 0 3 mL (0 3 mg total) into a muscle once for 1 dose (Patient taking differently: Inject 0 3 mg into a muscle as needed ), Disp: 0 6 mL, Rfl: 1    latanoprost (XALATAN) 0 005 % ophthalmic solution, Administer 1 drop to both eyes daily at bedtime  , Disp: , Rfl:     levothyroxine 50 mcg tablet, Take 1 tablet (50 mcg total) by mouth daily As directed, Disp: 90 tablet, Rfl: 1    loratadine (CLARITIN) 10 mg tablet, Take 10 mg by mouth daily  , Disp: , Rfl:     Multiple Vitamin (MULTIVITAMIN) tablet, Take 1 tablet by mouth daily  , Disp: , Rfl:     venlafaxine (EFFEXOR-XR) 37 5 mg 24 hr capsule, Take 1 capsule (37 5 mg total) by mouth daily, Disp: 30 capsule, Rfl: 3    Vitamin D, Cholecalciferol, 1000 UNITS TABS, Take 1,000 Units by mouth daily  , Disp: , Rfl:     Allergies   Allergen Reactions    Shellfish-Derived Products Anaphylaxis and Itching       Objective:  Vitals:    08/19/19 1140   BP: 116/76   Pulse: 72       Right Ankle Exam     Tenderness   The patient is experiencing tenderness in the ATF    Swelling: moderate    Range of Motion   Dorsiflexion: 10   Plantar flexion: 30   Eversion: normal   Inversion: normal Right ankle inversion: painful  Muscle Strength   Dorsiflexion:  5/5  Plantar flexion:  5/5    Tests   Anterior drawer: negative    Other   Erythema: absent  Scars: absent  Sensation: normal  Pulse: present     Comments:  Squeeze (-)          Observations     Right Ankle/Foot   Negative for adhesive scar  Strength/Myotome Testing     Right Ankle/Foot   Dorsiflexion: 5  Plantar flexion: 5      Physical Exam   Constitutional: She is oriented to person, place, and time  She appears well-developed and well-nourished  HENT:   Head: Normocephalic and atraumatic  Eyes: Pupils are equal, round, and reactive to light  Conjunctivae are normal    Neck: Normal range of motion  Neck supple  Cardiovascular: Normal rate and intact distal pulses  Pulmonary/Chest: Effort normal  No respiratory distress  Musculoskeletal:   As noted in HPI   Neurological: She is alert and oriented to person, place, and time  Skin: Skin is warm and dry  Psychiatric: She has a normal mood and affect  Her behavior is normal    Vitals reviewed  I have personally reviewed pertinent films in PACS and my interpretation is as follows:  X-ray of the right ankle obtained on 08/19/2019 shows a small avulsion fracture of the distal fibula

## 2019-08-26 ENCOUNTER — EVALUATION (OUTPATIENT)
Dept: PHYSICAL THERAPY | Facility: CLINIC | Age: 59
End: 2019-08-26
Payer: COMMERCIAL

## 2019-08-26 DIAGNOSIS — S82.61XA AVULSION FRACTURE OF LATERAL MALLEOLUS OF RIGHT FIBULA, CLOSED, INITIAL ENCOUNTER: Primary | ICD-10-CM

## 2019-08-26 PROCEDURE — 97161 PT EVAL LOW COMPLEX 20 MIN: CPT

## 2019-08-26 PROCEDURE — 97112 NEUROMUSCULAR REEDUCATION: CPT

## 2019-08-26 NOTE — PROGRESS NOTES
PT Evaluation     Today's date: 2019  Patient name: Ronny Lindsay  : 1960  MRN: [id number]  Referring provider: Cindy Barry MD  Dx:   Encounter Diagnosis     ICD-10-CM    1  Avulsion fracture of lateral malleolus of right fibula, closed, initial encounter S82  [facility] Ambulatory referral to Physical Therapy       Start Time: 7938  Stop Time: 183  Total time in clinic (min): 37 minutes    Assessment  Assessment details: Pt is a pleasant 62 y o  female who presents to [place] [address] PT with R ankle pain stemming from lateral malleolar avulsion fracture  Today, she presents with small limitations in active ankle ROM, ankle strength, mod self reports of pain, balance deficits, and decreased tolerance to activity  Functionally, she is limited in her ability to stand and ambulate, negotiate down stairs, run, bike, and perform age appropriate recreational activity  She is motivated to improve  Pt will benefit from skilled PT to address the aforementioned deficits and limitations in an effort to maximize pain free functional mobility and overall quality of life  Progress as able with these goals in mind  Impairments: abnormal gait, abnormal muscle firing, abnormal muscle tone, abnormal or restricted ROM, abnormal movement, activity intolerance, impaired physical strength, lacks appropriate home exercise program and pain with function  Understanding of Dx/Px/POC: good   Prognosis: good    Goals  Short term goals (3 weeks):  1) Pt will improve R ankle ROM to WNL pain free  2) Pt will improve B LE and core strength deficits by 1/3 grade MMT  3) Pt will abolish all complaints of pain in R ankle  4) Pt will initiate and progress HEP w/ special emphasis on functional ankle mobility and strength  Long term goals (6 weeks)  1) Pt will improve FOTO to at least 75   2) Pt will be functionally unlimited w/ stairs, step over step w/ no pain     3) Pt will return to age appropriate recreation w/o limitation based on R ankle  4) Pt will be independent and compliant w/ HEP in order to maximize functional benefit of skilled PT following d/c        Plan  Plan details:   HEP to start: see scanned doc    Pt to be seen for follow up in 2 weeks as she will be away next week  Patient would benefit from: skilled PT  Planned modality interventions: cryotherapy and thermotherapy: hydrocollator packs  Planned therapy interventions: abdominal trunk stabilization, activity modification, joint mobilization, manual therapy, massage, motor coordination training, neuromuscular re-education, patient education, postural training, stretching, strengthening, therapeutic activities, therapeutic exercise, therapeutic training, transfer training, home exercise program, gait training, graded activity, functional ROM exercises, graded exercise, flexibility, body mechanics training, balance and balance/weight bearing training  Frequency: 2x week  Duration in visits: 12  Duration in weeks: 6  Treatment plan discussed with: patient        Subjective Evaluation    History of Present Illness  Mechanism of injury: Pt has sustained multiple inversion sprains over the past summer, resulting in avulsion fracture of R distal fibula  First injury around , was pulled by dog and stepped off cement pad wrong  Suffered inversion sprain  August 10, was in [employer], running to [employer] train and turned ankle in  Reports that her pain has improved but the ankle still feels unsteady  Will be away in [place] next week   Pt functional status below:   Quality of life: good    Pain  Current pain ratin  At best pain ratin  At worst pain ratin  Location: R lateral ankle, occasionally along R anterior TC joint   Quality: sharp and throbbing  Relieving factors: medications, rest and ice  Aggravating factors: standing, walking, stair climbing and running  Progression: improved    Social Support  Steps to enter house: yes  Stairs in house: yes   Lives in: multiple-level home  Lives with: adult children    Employment status: working (works in [employer] at [employer] )    Diagnostic Tests  X-ray: abnormal (avulsion fracture of R lateral malleoulus )  Patient Goals  Patient goals for therapy: increased strength, decreased pain, increased motion and return to sport/leisure activities  Patient goal: improve ankle strength, get back to biking, walk without pain        Objective     Palpation     Additional Palpation Details  TTP over distal tip of R lateral malleolus     Neurological Testing     Sensation     Ankle/Foot   Left Ankle/Foot   Intact: light touch    Right Ankle/Foot   Intact: light touch     Active Range of Motion   Left Ankle/Foot   Normal active range of motion    Additional Active Range of Motion Details  R ankle PF and DF limited in final 25% of ROM by stiffness, no significant pain    Passive Range of Motion   Left Ankle/Foot  Normal passive range of motion    Additional Passive Range of Motion Details  R ankle stiff into end ranges of DF and PF but not limited    Joint Play     Right Ankle/Foot  Hypomobile in the proximal tibiofibular joint, distal tibiofibular joint and talocrural joint       Strength/Myotome Testing     Left Ankle/Foot   Normal strength    Right Ankle/Foot   Dorsiflexion: 4  Plantar flexion: 4  Inversion: 4  Eversion: 4  Great toe flexion: 4    Ambulation     Ambulation: Level Surfaces     Additional Level Surfaces Ambulation Details  Grossly unremarkable over clinical distances     Functional Assessment        Comments  SLS: x10-15 sec on R before tap down, mod ankle strategy - unlimited on L      Flowsheet Rows      Most Recent Value   PT/OT G-Codes   Current Score  58   Projected Score  71   FOTO information reviewed  Yes             Precautions: standard      Manual   8/26 - IE           DTM and TPR             Post TC joint mobs             MWM for DF                                               Exercise Diary   1 Active w/u             PROM  x2-3 mins all directions           Ankle isometrics              Ankle 4 way tband  GTB x10-15 each           WS training   reviewed            Heel raises  regular x 10-15, single leg x 5-10           Tandem walking             balance  SLS x20-30 sec total on R            airex             gait             Hip abd w/ ankle ev               HEP education and review x 4-5 mins                                                                                                                                 Modalities  1           heat             Ice                                 Update 9/17/2019: Pt chart to be formally d/c  She cancelled her two week follow up (for 9/11) and reported at that time that she was feeling good and would continue with home exercises

## 2019-10-02 NOTE — PROGRESS NOTES
Assessment/Plan:  NGE    s/p TLH-BSO   Dyspareunia/AV- continue with over-the-counter lubricant  Dr Campbell Overall to evaluate hemorrhoid at [address] [place] [address] later this month  Co- Testing - NA    RTO 1 yr  SBE monthly  3 D Mammography   Colonoscopy- 1 polyp '15  Exercise 3/wk - inadequate, works FT, lost 6 #'s '18, gained 5 '19  Calcium 1,000 mg/d with Vit D        Diagnoses and all orders for this visit:    Encounter for annual routine gynecological examination    Encounter for screening mammogram for breast cancer  -     Mammo screening bilateral w 3d & cad; Future    Atrophic vaginitis              Subjective:        Patient ID: Ben Lovett is a 62 y o  female  Riki  is here for her annual visit  She still gets occasional hot flashes and night sweats  The majority occur while she is at work  The following portions of the patient's history were reviewed and updated as appropriate: She  has a past medical history of Arthritis, Complex cyst of right ovary, Glaucoma, Hyperthyroidism, and Uterus fibroma    Patient Active Problem List    Diagnosis Date Noted    Hemorrhoid 2018    Encounter for annual routine gynecological examination 2018    Encounter for screening mammogram for breast cancer 2018    Acquired deformity of foot 2018    Valgus deformity of great toe 2018    Pain in both feet 2018    Congenital pes planus of right foot 2018    Congenital pes planus of left foot 2018    Goiter 2017    Adnexal mass 2016    Anxiety 2016    Primary hypothyroidism 2016    Obstructive sleep apnea 2016    Atrophic vaginitis 10/30/2015    Allergic rhinitis 2014   PMH:   2 para 2; C/S X 2- 1st CPD/OP, 2nd Repeat- ,   Hyperthyroidism with goiter '  Anxiety  Uterine fibroid  LTL '  Menopause - 54  Hypothyroidism   Colonoscopy 4/15 one polyp  Heather  wnl  Dyspareunia, AV      Complex Pelvic Mass- TL H, BSO, AMINTA  Cystoscopy, proctoscopy- endometriosis, extensive adhesions, 6 2 right ovarian     serous cystadenofibroma, simple cystic endometrial hyperplasia with mild atypia  16         Avulsion fracture right ankle, sprained twice- summer '19  She  has a past surgical history that includes  section; Tubal ligation; pr laparoscopy w tot hysterectuterus <=250 gram  w tube/ovary (N/A, 2016); and LAPAROTOMY (N/A, 2016)  Her family history includes Cancer in her brother; Diabetes in her father; Heart disease in her brother and mother; No Known Problems in her daughter, daughter, maternal aunt, maternal grandfather, maternal grandmother, maternal uncle, paternal aunt, paternal grandfather, paternal grandmother, paternal uncle, and sister; Seizures in her brother  FH:  Brother 67 with tonsil or tongue cancer   F d 80  M- Uterine Ca 93, d 95 of natural causes, HTN  PGM- MI  PGF - DM  B- Pneumonia, Cardiac arrest d 70   She  reports that she has never smoked  She has never used smokeless tobacco  She reports that she drinks alcohol  She reports that she does not use drugs  SH:  '81  [place]- in  she had her 31 anniversary at work  Thinking of retiring next May  Her  already is retired  Current Outpatient Medications   Medication Sig Dispense Refill    b complex vitamins tablet Take 1 tablet by mouth daily   calcium carbonate (OS-MARVA) 600 MG tablet Take 600 mg by mouth daily   EPINEPHrine (EPIPEN) 0 3 mg/0 3 mL SOAJ Inject 0 3 mL (0 3 mg total) into a muscle once for 1 dose (Patient taking differently: Inject 0 3 mg into a muscle as needed ) 0 6 mL 1    latanoprost (XALATAN) 0 005 % ophthalmic solution Administer 1 drop to both eyes daily at bedtime   levothyroxine 50 mcg tablet Take 1 tablet (50 mcg total) by mouth daily As directed 90 tablet 1    loratadine (CLARITIN) 10 mg tablet Take 10 mg by mouth daily        Multiple Vitamin (MULTIVITAMIN) tablet Take 1 tablet by mouth daily   venlafaxine (EFFEXOR-XR) 37 5 mg 24 hr capsule Take 1 capsule (37 5 mg total) by mouth daily 30 capsule 3    Vitamin D, Cholecalciferol, 1000 UNITS TABS Take 1,000 Units by mouth daily  No current facility-administered medications for this visit  Current Outpatient Medications on File Prior to Visit   Medication Sig    b complex vitamins tablet Take 1 tablet by mouth daily   calcium carbonate (OS-MARVA) 600 MG tablet Take 600 mg by mouth daily   EPINEPHrine (EPIPEN) 0 3 mg/0 3 mL SOAJ Inject 0 3 mL (0 3 mg total) into a muscle once for 1 dose (Patient taking differently: Inject 0 3 mg into a muscle as needed )    latanoprost (XALATAN) 0 005 % ophthalmic solution Administer 1 drop to both eyes daily at bedtime   levothyroxine 50 mcg tablet Take 1 tablet (50 mcg total) by mouth daily As directed    loratadine (CLARITIN) 10 mg tablet Take 10 mg by mouth daily   Multiple Vitamin (MULTIVITAMIN) tablet Take 1 tablet by mouth daily   venlafaxine (EFFEXOR-XR) 37 5 mg 24 hr capsule Take 1 capsule (37 5 mg total) by mouth daily    Vitamin D, Cholecalciferol, 1000 UNITS TABS Take 1,000 Units by mouth daily  No current facility-administered medications on file prior to visit  She is allergic to shellfish-derived products       Review of Systems   Constitutional: Negative for activity change, appetite change, fatigue and unexpected weight change  Eyes: Negative for visual disturbance  Respiratory: Negative for cough, chest tightness, shortness of breath and wheezing  Cardiovascular: Negative for chest pain, palpitations and leg swelling  Breast: Patient denies tenderness, nipple discharge, masses, or erythema  Gastrointestinal: Negative for abdominal distention, abdominal pain, blood in stool, constipation, diarrhea, nausea and vomiting  Endocrine: Negative for cold intolerance and heat intolerance     Genitourinary: Negative for decreased urine volume, difficulty urinating, dyspareunia, dysuria, frequency, hematuria, menstrual problem, pelvic pain, urgency, vaginal bleeding, vaginal discharge and vaginal pain  [place] twice a week using a lubricant  No stress incontinence  Mild urgency only after coffee in the morning  Musculoskeletal: Negative for arthralgias  Skin: Negative for rash  Neurological: Negative for weakness, light-headedness, numbness and headaches  Hematological: Does not bruise/bleed easily  Psychiatric/Behavioral: Negative for agitation, behavioral problems and sleep disturbance  The patient is not nervous/anxious  Objective: There were no vitals filed for this visit  Physical Exam   Constitutional: She is oriented to person, place, and time  She appears well-developed and well-nourished  HENT:   Head: Normocephalic and atraumatic  Eyes: Pupils are equal, round, and reactive to light  Conjunctivae and EOM are normal    Neck: Normal range of motion  Neck supple  No tracheal deviation present  No thyromegaly present  Cardiovascular: Normal rate, regular rhythm and normal heart sounds  No murmur heard  Pulmonary/Chest: Effort normal and breath sounds normal  No respiratory distress  She has no wheezes  Right breast exhibits no inverted nipple, no mass, no nipple discharge, no skin change and no tenderness  Left breast exhibits no inverted nipple, no mass, no nipple discharge, no skin change and no tenderness  No breast tenderness, discharge or bleeding  Breasts are symmetrical    Abdominal: Soft  Bowel sounds are normal  She exhibits no distension and no mass  There is no tenderness  Genitourinary: Vagina normal  Rectal exam shows no external hemorrhoid  No breast tenderness, discharge or bleeding  There is no rash, tenderness or lesion on the right labia  There is no rash, tenderness or lesion on the left labia   Right adnexum displays no mass, no tenderness and no fullness  Left adnexum displays no mass, no tenderness and no fullness  Genitourinary Comments: Urethral meatus within normal limits  Perineum within normal limits  Bladder well supported  Uterus and cervix surgically removed  Physiologic vaginal atrophy  Musculoskeletal: Normal range of motion  Neurological: She is alert and oriented to person, place, and time  Skin: Skin is warm and dry  Psychiatric: She has a normal mood and affect  Her behavior is normal  Judgment and thought content normal    Nursing note and vitals reviewed

## 2019-10-03 ENCOUNTER — ANNUAL EXAM (OUTPATIENT)
Dept: GYNECOLOGY | Facility: CLINIC | Age: 59
End: 2019-10-03
Payer: COMMERCIAL

## 2019-10-03 VITALS
SYSTOLIC BLOOD PRESSURE: 138 MMHG | DIASTOLIC BLOOD PRESSURE: 76 MMHG | WEIGHT: 165 LBS | HEIGHT: 66 IN | BODY MASS INDEX: 26.52 KG/M2

## 2019-10-03 DIAGNOSIS — Z01.419 ENCOUNTER FOR ANNUAL ROUTINE GYNECOLOGICAL EXAMINATION: Primary | ICD-10-CM

## 2019-10-03 DIAGNOSIS — N95.2 ATROPHIC VAGINITIS: ICD-10-CM

## 2019-10-03 DIAGNOSIS — Z12.31 ENCOUNTER FOR SCREENING MAMMOGRAM FOR BREAST CANCER: ICD-10-CM

## 2019-10-03 PROCEDURE — S0612 ANNUAL GYNECOLOGICAL EXAMINA: HCPCS | Performed by: OBSTETRICS & GYNECOLOGY

## 2019-10-16 ENCOUNTER — CONSULT (OUTPATIENT)
Dept: PULMONOLOGY | Facility: MEDICAL CENTER | Age: 59
End: 2019-10-16
Payer: COMMERCIAL

## 2019-10-16 VITALS
RESPIRATION RATE: 12 BRPM | BODY MASS INDEX: 25.71 KG/M2 | WEIGHT: 160 LBS | TEMPERATURE: 97.5 F | HEIGHT: 66 IN | OXYGEN SATURATION: 97 % | SYSTOLIC BLOOD PRESSURE: 120 MMHG | DIASTOLIC BLOOD PRESSURE: 80 MMHG | HEART RATE: 77 BPM

## 2019-10-16 DIAGNOSIS — G47.33 OBSTRUCTIVE SLEEP APNEA: Primary | Chronic | ICD-10-CM

## 2019-10-16 PROCEDURE — 99244 OFF/OP CNSLTJ NEW/EST MOD 40: CPT | Performed by: NURSE PRACTITIONER

## 2019-10-16 NOTE — PROGRESS NOTES
Assessment/Plan:       Problem List Items Addressed This Visit        Respiratory    Obstructive sleep apnea - Primary (Chronic)     Patient had a diagnosis of obstructive sleep apnea approximately 5 years ago  Currently, I do not have availability of her diagnostic test   The plan includes locating it if it is available  Once her diagnostic test is re-treated, I will order an auto CPAP  Patient is aware and agreeable with this plan of care  If the study is retrieved I will contact patient  If not she is agreeable to home sleep study   Addendum:  I was able to obtain results of sleep study both diagnostic and treatment  Diagnostic sleep study was done in September of 2007  Apneic hypopnea index was 27 1 events per hour  They were associated with oxygen desaturation and marked disruption of sleep  She had a titration study done November 8, 2007  CPAP was initiated at 5 cm of water pressure and optimal pressure was at 8 cm  Plan includes auto CPAP 8-14 cm of water pressure  Patient is aware that she will be required to return for compliance visit approximately 45 days after beginning CPAP for history of moderate obstructive sleep apnea  She met with respiratory therapist Nohemi tracy and was fitted with air fit F 30 size small  Additionally, she was able to visualize CPAP machine  All questions were answered  Relevant Orders    CPAP Auto New DME            Return in about 6 weeks (around 11/27/2019)  All questions are answered to the patient's satisfaction and understanding  She verbalizes understanding  She is encouraged to call with any further questions or concerns  Portions of the record may have been created with voice recognition software  Occasional wrong word or "sound a like" substitutions may have occurred due to the inherent limitations of voice recognition software  Read the chart carefully and recognize, using context, where substitutions have occurred  a  HPI:  Min Patel is a 25-year-old female with history of hypothyroidism and past medical history of obstructive sleep apnea  According to patient she had a diagnostic test approximately 5 years ago  This was done at [facility]  It appears that patient likely had his treatment study but never received to CPAP machine  She did have a dentist fit her for an oral mandibular mouth piece that she has been using for 4 years  She recently had a new 1 the finds this is not as effective as it was previously  She continues to snore with the oral mandibular mouth piece  Her  accompanies her today  She does snore heavily and has witnessed apnea  She does not wake up feeling refreshed  She sleeps approximately 7 hours per night  Electronically Signed by NIK Cisse    ______________________________________________________________________    Chief Complaint:   Chief Complaint   Patient presents with    Sleep Apnea     witnessed wakes up gasping    Snoring     witnessed        Patient ID: Bessy Pollack is a 62 y o  y o  female has a past medical history of Arthritis, Complex cyst of right ovary, Glaucoma, Hyperthyroidism, and Uterus fibroma  10/16/2019  Patient presents today for initial visit  HPI    Occupational/Exposure history:  Pets/Enviroment:  Travel history:  Review of Systems   Constitutional: Negative  Negative for appetite change and fever  HENT: Positive for rhinorrhea  Negative for ear pain, postnasal drip, sneezing, sore throat and trouble swallowing  Eyes: Negative  Cardiovascular: Negative  Negative for chest pain  Gastrointestinal: Negative  Endocrine: Negative  Genitourinary: Negative  Musculoskeletal: Negative  Negative for myalgias  Skin: Negative  Allergic/Immunologic: Negative  Neurological: Positive for headaches  Hematological: Negative  Psychiatric/Behavioral: Negative  Social history: She reports that she has never smoked   She has never used smokeless tobacco  She reports that she drinks alcohol  She reports that she does not use drugs  Past surgical history:   Past Surgical History:   Procedure Laterality Date     SECTION      X2    LAPAROTOMY N/A 2016    Procedure: LAPAROTOMY EXPLORATORY; OVARIAN CANCER STAGING ;  Surgeon: Nati Estrada MD;  [facility];  Service:     [place] LAPAROSCOPY W TOT HYSTERECTUTERUS <=250 Karlynn Halie TUBE/OVARY N/A 2016    Procedure: HYSTERECTOMY LAPAROSCOPIC TOTAL (LTH),cystoscopy, prostoctopy;  Surgeon: Nati Estrada MD;  [facility];  Service: Gynecology Oncology    TUBAL LIGATION       Family history:   Family History   Problem Relation Age of Onset    Heart disease Mother     Diabetes Father     No Known Problems Sister     Seizures Brother     Heart disease Brother     No Known Problems Daughter     No Known Problems Maternal Grandmother     No Known Problems Maternal Grandfather     No Known Problems Paternal Grandmother     No Known Problems Paternal Grandfather     Cancer Brother         lump on neck    No Known Problems Daughter     No Known Problems Maternal Aunt     No Known Problems Maternal Uncle     No Known Problems Paternal Aunt     No Known Problems Paternal Uncle     Breast cancer additional onset Neg Hx     Mental illness Neg Hx        Immunization History   Administered Date(s) Administered    Influenza, recombinant, quadrivalent,injectable, preservative free 2018    Tdap 2007, 2017     Current Outpatient Medications   Medication Sig Dispense Refill    b complex vitamins tablet Take 1 tablet by mouth daily   calcium carbonate (OS-MARVA) 600 MG tablet Take 600 mg by mouth daily        EPINEPHrine (EPIPEN) 0 3 mg/0 3 mL SOAJ Inject 0 3 mL (0 3 mg total) into a muscle once for 1 dose (Patient taking differently: Inject 0 3 mg into a muscle as needed ) 0 6 mL 1    latanoprost (XALATAN) 0 005 % ophthalmic solution Administer 1 drop to both eyes daily at bedtime   levothyroxine 50 mcg tablet Take 1 tablet (50 mcg total) by mouth daily As directed 90 tablet 1    loratadine (CLARITIN) 10 mg tablet Take 10 mg by mouth daily   Multiple Vitamin (MULTIVITAMIN) tablet Take 1 tablet by mouth daily   venlafaxine (EFFEXOR-XR) 37 5 mg 24 hr capsule Take 1 capsule (37 5 mg total) by mouth daily 30 capsule 3    Vitamin D, Cholecalciferol, 1000 UNITS TABS Take 1,000 Units by mouth daily  No current facility-administered medications for this visit  Allergies: Shellfish-derived products    Objective:  Vitals:    10/16/19 1415   BP: 120/80   BP Location: Left arm   Patient Position: Sitting   Cuff Size: Standard   Pulse: 77   Resp: 12   Temp: 97 5 °F (36 4 °C)   TempSrc: Tympanic   SpO2: 97%   Weight: 72 6 kg (160 lb)   Height: 5' 6" (1 676 m)   Oxygen Therapy  SpO2: 97 %    Wt Readings from Last 3 Encounters:   10/16/19 72 6 kg (160 lb)   10/03/19 74 8 kg (165 lb)   08/19/19 76 kg (167 lb 9 6 oz)     Body mass index is 25 82 kg/m²  Physical Exam   Constitutional: She is oriented to person, place, and time  She appears well-developed and well-nourished  HENT:   Head: Normocephalic and atraumatic  Mallampati 3   Eyes: Pupils are equal, round, and reactive to light  EOM are normal    Neck: Normal range of motion  Neck supple  Cardiovascular: Normal rate and regular rhythm  Pulmonary/Chest: Effort normal    Abdominal: Soft  Musculoskeletal: Normal range of motion  Neurological: She is alert and oriented to person, place, and time  Skin: Skin is warm and dry  Capillary refill takes less than 2 seconds  Psychiatric: She has a normal mood and affect  Her behavior is normal        Lab Review:   Orders Only on 07/23/2019   Component Date Value    Free t4 07/23/2019 1 33     TSH 07/23/2019 4 860*       Diagnostics:  I have personally reviewed pertinent reports        Office Spirometry Results:     ESS: Total score: 13  No results found    Answers for HPI/ROS submitted by the patient on 10/10/2019   Primary symptoms  When did you first notice your symptoms?: more than 1 year ago  Since you first noticed this problem, how has it changed?: gradually worsening  Do you have shortness of breath that occurs with effort or exertion?: No  Do you have ear congestion?: No  Do you have heartburn?: No  Do you have fatigue?: No  Do you have nasal congestion?: Yes  Do you have shortness of breath when lying flat?: No  Do you have shortness of breath when you wake up?: No  Do you have sweats?: Yes  Have you experienced weight loss?: No  Which of the following makes your symptoms worse?: nothing  Which of the following makes your symptoms better?: change in position

## 2019-10-16 NOTE — PATIENT INSTRUCTIONS
Sleep Apnea   AMBULATORY CARE:   Sleep apnea  is also called obstructive sleep apnea  It is a condition that causes you to stop breathing for 10 seconds or more while you are sleeping  During normal sleep, your throat is kept open by muscles that let air pass through easily  Sleep apnea causes the muscles and tissues around your throat to relax and block air from passing through  Sleep apnea may happen many times while you are asleep  Common symptoms include the following:   · No signs of breathing for 10 seconds or more while you sleep    · Snoring loudly, snorting, gasping or choking while you sleep, and waking up suddenly because of these    · A hard time thinking, remembering things, or focusing on your tasks the following day    · Headache or nausea    · Bedwetting or waking up often during the night to urinate    · Feeling sleepy, slow, and tired during the day  Seek care immediately if:   · You have chest pain or trouble breathing  Contact your healthcare provider if:   · You feel tired or depressed  · You have trouble staying awake during the day  · You have trouble thinking clearly  · You have questions or concerns about your condition or care  Treatment for sleep apnea  includes using a continuous positive airway pressure (CPAP) machine to keep your airway open during sleep  A mask is placed over your nose and mouth, or just your nose  The mask is hooked to the CPAP machine that blows a gentle stream of air into the mask when you breathe  This helps keep your airway open so you can breathe more regularly  Extra oxygen may be given to you through the machine  You may be given a mouth device  It looks like a mouth guard or dental retainer and stops your tongue and mouth tissues from blocking your throat while you sleep  Surgery may be needed to remove extra tissues that block your mouth, throat, or nose  Manage sleep apnea:   · Do not smoke    Nicotine and other chemicals in cigarettes and cigars can cause lung damage  Ask your healthcare provider for information if you currently smoke and need help to quit  E-cigarettes or smokeless tobacco still contain nicotine  Talk to your healthcare provider before you use these products  · Do not drink alcohol or take sedative medicine before you go to sleep  Alcohol and sedatives can relax the muscles and tissues around your throat  This can block the airflow to your lungs  · Maintain a healthy weight  Excess tissue around your throat may restrict your breathing  Ask your healthcare provider for information if you need to lose weight  · Sleep on your side or use pillows designed to prevent sleep apnea  This prevents your tongue or other tissues from blocking your throat  You can also raise the head of your bed  Follow up with your healthcare provider as directed:  Write down your questions so you remember to ask them during your visits  © 2017 [employer] Information is for End User's use only and may not be sold, redistributed or otherwise used for commercial purposes  All illustrations and images included in CareNotes® are the copyrighted property of [employer]  or [employer] A [employer]  The above information is an  only  It is not intended as medical advice for individual conditions or treatments  Talk to your doctor, nurse or pharmacist before following any medical regimen to see if it is safe and effective for you

## 2019-10-16 NOTE — ASSESSMENT & PLAN NOTE
Patient had a diagnosis of obstructive sleep apnea approximately 5 years ago  Currently, I do not have availability of her diagnostic test   The plan includes locating it if it is available  Once her diagnostic test is re-treated, I will order an auto CPAP  Patient is aware and agreeable with this plan of care  If the study is retrieved I will contact patient  If not she is agreeable to home sleep study   Addendum:  I was able to obtain results of sleep study both diagnostic and treatment  Diagnostic sleep study was done in September of 2007  Apneic hypopnea index was 27 1 events per hour  They were associated with oxygen desaturation and marked disruption of sleep  She had a titration study done November 8, 2007  CPAP was initiated at 5 cm of water pressure and optimal pressure was at 8 cm  Plan includes auto CPAP 8-14 cm of water pressure  Patient is aware that she will be required to return for compliance visit approximately 45 days after beginning CPAP for history of moderate obstructive sleep apnea  She met with respiratory therapist Raisa tracy and was fitted with air fit F 30 size small  Additionally, she was able to visualize CPAP machine  All questions were answered

## 2019-10-18 NOTE — H&P (VIEW-ONLY)
Colon and Rectal Surgery   Carmelita Green 62 y o  female MRN [id number]  Encounter: [id number]  10/21/19 5:31 PM            Assessment: Carmelita Green is a 62 y o  female who has hemorrhoid symptoms  Plan:   Hemorrhoid  Her hemorrhoids are truly large  There is tremendous amount of external prolapsing and redundancy of the skin tissues  Hemorrhoidectomy would be the only appropriate treatment for her that would be likely to result in cure of her symptoms  Risks, not limited to bleeding, pain, persistent disease, need for future surgery, fecal incontinence, or nonhealing wounds were reviewed at length  Questions were answered  She agrees to hemorrhoidectomy  Subjective     HPI    Carmelita Green is a 62 y o  female who is referred today by Dr Alec Hancock, for evaluation of hemorrhoids  She feels a lump at the anus that causes burning at times  She feels that hygiene is an issue  Denies any rectal bleeding  Her last colonoscopy was 2015, with Dr Moishe Severe, which showed internal hemorrhoids  Pathology reported rectosigmoid hyperplastic polyp  Recall colonoscopy 5 years  Historical Information   Past Medical History:   Diagnosis Date    Arthritis     Complex cyst of right ovary     Glaucoma     Hyperthyroidism     Uterus fibroma      Past Surgical History:   Procedure Laterality Date     SECTION      X2    LAPAROTOMY N/A 2016    Procedure: LAPAROTOMY EXPLORATORY; OVARIAN CANCER STAGING ;  Surgeon: Kike Abdul MD;  [facility];  Service:     [place] LAPAROSCOPY W TOT HYSTERECTUTERUS <=250 Anand Boehringer  W TUBE/OVARY N/A 2016    Procedure: HYSTERECTOMY LAPAROSCOPIC TOTAL (LTH),cystoscopy, prostoctopy;  Surgeon: Kike Abdul MD;  [facility];  Service: Gynecology Oncology    TUBAL LIGATION         Meds/Allergies       Current Outpatient Medications:     b complex vitamins tablet, Take 1 tablet by mouth daily  , Disp: , Rfl:     calcium carbonate (OS-MARVA) 600 MG tablet, Take 600 mg by mouth daily  , Disp: , Rfl:     EPINEPHrine (EPIPEN) 0 3 mg/0 3 mL SOAJ, Inject 0 3 mL (0 3 mg total) into a muscle once for 1 dose (Patient taking differently: Inject 0 3 mg into a muscle as needed ), Disp: 0 6 mL, Rfl: 1    latanoprost (XALATAN) 0 005 % ophthalmic solution, Administer 1 drop to both eyes daily at bedtime  , Disp: , Rfl:     levothyroxine 50 mcg tablet, Take 1 tablet (50 mcg total) by mouth daily As directed, Disp: 90 tablet, Rfl: 1    loratadine (CLARITIN) 10 mg tablet, Take 10 mg by mouth daily  , Disp: , Rfl:     Multiple Vitamin (MULTIVITAMIN) tablet, Take 1 tablet by mouth daily  , Disp: , Rfl:     venlafaxine (EFFEXOR-XR) 37 5 mg 24 hr capsule, Take 1 capsule (37 5 mg total) by mouth daily, Disp: 30 capsule, Rfl: 3    Vitamin D, Cholecalciferol, 1000 UNITS TABS, Take 1,000 Units by mouth daily  , Disp: , Rfl:   Allergies   Allergen Reactions    Shellfish-Derived Products Anaphylaxis and Itching       Social History   Social History     Substance and Sexual Activity   Drug Use No     Social History     Tobacco Use   Smoking Status Never Smoker   Smokeless Tobacco Never Used         Family History   Problem Relation Age of Onset    Heart disease Mother     Diabetes Father     No Known Problems Sister     Seizures Brother     Heart disease Brother     No Known Problems Daughter     No Known Problems Maternal Grandmother     No Known Problems Maternal Grandfather     No Known Problems Paternal Grandmother     No Known Problems Paternal Grandfather     Cancer Brother         lump on neck    No Known Problems Daughter     No Known Problems Maternal Aunt     No Known Problems Maternal Uncle     No Known Problems Paternal Aunt     No Known Problems Paternal Uncle     Breast cancer additional onset Neg Hx     Mental illness Neg Hx          Review of Systems   Constitutional: Negative  Respiratory: Negative  Cardiovascular: Negative  Gastrointestinal:        Anal irritation and difficulty with hygiene  Objective   Current Vitals:  Vitals:    10/21/19 1631   Weight: 75 3 kg (166 lb)   Height: 5' 6" (1 676 m)         Physical Exam   Constitutional: She is oriented to person, place, and time  She appears well-developed and well-nourished  Eyes: Conjunctivae are normal    Cardiovascular: Normal rate and regular rhythm  Pulmonary/Chest: Effort normal and breath sounds normal    Abdominal: Soft  She exhibits no distension and no mass  There is no tenderness  There is no guarding  Genitourinary:   Genitourinary Comments: ALMAZ normal except for very large, redundant internal and external hemorrhoids with grade IV prolapse  Neurological: She is alert and oriented to person, place, and time  Skin: Skin is warm and dry  Psychiatric: She has a normal mood and affect   Her behavior is normal  Judgment and thought content normal

## 2019-10-23 ENCOUNTER — TELEPHONE (OUTPATIENT)
Dept: PULMONOLOGY | Facility: MEDICAL CENTER | Age: 59
End: 2019-10-23

## 2019-10-23 NOTE — TELEPHONE ENCOUNTER
Mimi Vinson from [employer] called to locate a sleep study patient states done roughly around 5 years ago  We have nothing in her chart and Sirisha Smith went to the sleep center to locate and we have no record of her having it done at our facility  Tried to call Mimi Vinson back to inform and have been disconnected several times

## 2019-10-25 PROBLEM — K64.9 HEMORRHOIDS: Status: ACTIVE | Noted: 2019-10-25

## 2019-11-01 NOTE — PRE-PROCEDURE INSTRUCTIONS
Pre-Surgery Instructions:   Medication Instructions    b complex vitamins tablet Instructed patient per Anesthesia Guidelines   calcium carbonate (OS-MARVA) 600 MG tablet Instructed patient per Anesthesia Guidelines   EPINEPHrine (EPIPEN) 0 3 mg/0 3 mL SOAJ Instructed patient per Anesthesia Guidelines   latanoprost (XALATAN) 0 005 % ophthalmic solution Instructed patient per Anesthesia Guidelines   levothyroxine 50 mcg tablet Instructed patient per Anesthesia Guidelines   loratadine (CLARITIN) 10 mg tablet Instructed patient per Anesthesia Guidelines   Multiple Vitamin (MULTIVITAMIN) tablet Instructed patient per Anesthesia Guidelines   venlafaxine (EFFEXOR-XR) 37 5 mg 24 hr capsule Instructed patient per Anesthesia Guidelines   Vitamin D, Cholecalciferol, 1000 UNITS TABS Instructed patient per Anesthesia Guidelines  Review via phone with patient medications and showering instructions  Advices stop vitamins, NSAID'S ok to take tylenol products  Advice [facility] call  Verbalized understanding  Antidepressant Med Class   Continue to take this medication on your normal schedule  If this is an oral medication and you take it in the morning, then you may take this medicine with a sip of water  Thyroxine Med Class   Continue to take this medication on your normal schedule  If this is an oral medication and you take it in the morning, then you may take this medicine with a sip of water  Vitamin Med Class   You may continue to take any vitamin that your surgeon has prescribed to you up to the day before surgery   If your surgeon has not specifically prescribed this vitamin or instructed you to continue then stop taking 7 days prior to surgery

## 2019-11-04 ENCOUNTER — ANESTHESIA EVENT (OUTPATIENT)
Dept: PERIOP | Facility: HOSPITAL | Age: 59
End: 2019-11-04
Payer: COMMERCIAL

## 2019-11-05 ENCOUNTER — HOSPITAL ENCOUNTER (OUTPATIENT)
Facility: HOSPITAL | Age: 59
Setting detail: OUTPATIENT SURGERY
Discharge: HOME | End: 2019-11-05
Attending: COLON & RECTAL SURGERY | Admitting: COLON & RECTAL SURGERY
Payer: COMMERCIAL

## 2019-11-05 ENCOUNTER — ANESTHESIA (OUTPATIENT)
Dept: PERIOP | Facility: HOSPITAL | Age: 59
End: 2019-11-05
Payer: COMMERCIAL

## 2019-11-05 VITALS
HEART RATE: 75 BPM | RESPIRATION RATE: 16 BRPM | SYSTOLIC BLOOD PRESSURE: 139 MMHG | DIASTOLIC BLOOD PRESSURE: 73 MMHG | HEIGHT: 66 IN | OXYGEN SATURATION: 97 % | WEIGHT: 162 LBS | TEMPERATURE: 97.7 F | BODY MASS INDEX: 26.03 KG/M2

## 2019-11-05 DIAGNOSIS — K64.3 GRADE IV HEMORRHOIDS: Primary | ICD-10-CM

## 2019-11-05 DIAGNOSIS — K64.9 HEMORRHOIDS, UNSPECIFIED HEMORRHOID TYPE: ICD-10-CM

## 2019-11-05 PROCEDURE — 88304 TISSUE EXAM BY PATHOLOGIST: CPT | Performed by: PATHOLOGY

## 2019-11-05 PROCEDURE — 46260 REMOVE IN/EX HEM GROUPS 2+: CPT | Performed by: COLON & RECTAL SURGERY

## 2019-11-05 RX ORDER — FENTANYL CITRATE 50 UG/ML
INJECTION, SOLUTION INTRAMUSCULAR; INTRAVENOUS AS NEEDED
Status: DISCONTINUED | OUTPATIENT
Start: 2019-11-05 | End: 2019-11-05 | Stop reason: SURG

## 2019-11-05 RX ORDER — DEXAMETHASONE SODIUM PHOSPHATE 10 MG/ML
INJECTION, SOLUTION INTRAMUSCULAR; INTRAVENOUS AS NEEDED
Status: DISCONTINUED | OUTPATIENT
Start: 2019-11-05 | End: 2019-11-05 | Stop reason: SURG

## 2019-11-05 RX ORDER — ONDANSETRON 2 MG/ML
INJECTION INTRAMUSCULAR; INTRAVENOUS AS NEEDED
Status: DISCONTINUED | OUTPATIENT
Start: 2019-11-05 | End: 2019-11-05 | Stop reason: SURG

## 2019-11-05 RX ORDER — PROPOFOL 10 MG/ML
INJECTION, EMULSION INTRAVENOUS AS NEEDED
Status: DISCONTINUED | OUTPATIENT
Start: 2019-11-05 | End: 2019-11-05 | Stop reason: SURG

## 2019-11-05 RX ORDER — ONDANSETRON 2 MG/ML
4 INJECTION INTRAMUSCULAR; INTRAVENOUS ONCE AS NEEDED
Status: DISCONTINUED | OUTPATIENT
Start: 2019-11-05 | End: 2019-11-05 | Stop reason: HOSPADM

## 2019-11-05 RX ORDER — SODIUM CHLORIDE, SODIUM LACTATE, POTASSIUM CHLORIDE, CALCIUM CHLORIDE 600; 310; 30; 20 MG/100ML; MG/100ML; MG/100ML; MG/100ML
125 INJECTION, SOLUTION INTRAVENOUS CONTINUOUS
Status: DISCONTINUED | OUTPATIENT
Start: 2019-11-05 | End: 2019-11-05 | Stop reason: HOSPADM

## 2019-11-05 RX ORDER — MAGNESIUM HYDROXIDE 1200 MG/15ML
LIQUID ORAL AS NEEDED
Status: DISCONTINUED | OUTPATIENT
Start: 2019-11-05 | End: 2019-11-05 | Stop reason: HOSPADM

## 2019-11-05 RX ORDER — MIDAZOLAM HYDROCHLORIDE 2 MG/2ML
INJECTION, SOLUTION INTRAMUSCULAR; INTRAVENOUS AS NEEDED
Status: DISCONTINUED | OUTPATIENT
Start: 2019-11-05 | End: 2019-11-05 | Stop reason: SURG

## 2019-11-05 RX ORDER — ACETAMINOPHEN 325 MG/1
650 TABLET ORAL EVERY 6 HOURS PRN
Status: DISCONTINUED | OUTPATIENT
Start: 2019-11-05 | End: 2019-11-05 | Stop reason: HOSPADM

## 2019-11-05 RX ORDER — OXYCODONE HYDROCHLORIDE AND ACETAMINOPHEN 5; 325 MG/1; MG/1
2 TABLET ORAL EVERY 4 HOURS PRN
Status: DISCONTINUED | OUTPATIENT
Start: 2019-11-05 | End: 2019-11-05 | Stop reason: HOSPADM

## 2019-11-05 RX ORDER — FENTANYL CITRATE/PF 50 MCG/ML
25 SYRINGE (ML) INJECTION
Status: DISCONTINUED | OUTPATIENT
Start: 2019-11-05 | End: 2019-11-05 | Stop reason: HOSPADM

## 2019-11-05 RX ORDER — OXYCODONE HYDROCHLORIDE AND ACETAMINOPHEN 5; 325 MG/1; MG/1
2 TABLET ORAL EVERY 6 HOURS PRN
Qty: 24 TABLET | Refills: 0 | Status: SHIPPED | OUTPATIENT
Start: 2019-11-05 | End: 2019-11-15

## 2019-11-05 RX ORDER — EPHEDRINE SULFATE 50 MG/ML
INJECTION INTRAVENOUS AS NEEDED
Status: DISCONTINUED | OUTPATIENT
Start: 2019-11-05 | End: 2019-11-05 | Stop reason: SURG

## 2019-11-05 RX ORDER — HYDROMORPHONE HCL/PF 1 MG/ML
0.2 SYRINGE (ML) INJECTION
Status: DISCONTINUED | OUTPATIENT
Start: 2019-11-05 | End: 2019-11-05 | Stop reason: HOSPADM

## 2019-11-05 RX ORDER — LIDOCAINE HYDROCHLORIDE 10 MG/ML
INJECTION, SOLUTION INFILTRATION; PERINEURAL AS NEEDED
Status: DISCONTINUED | OUTPATIENT
Start: 2019-11-05 | End: 2019-11-05 | Stop reason: SURG

## 2019-11-05 RX ORDER — SUCCINYLCHOLINE/SOD CL,ISO/PF 100 MG/5ML
SYRINGE (ML) INTRAVENOUS AS NEEDED
Status: DISCONTINUED | OUTPATIENT
Start: 2019-11-05 | End: 2019-11-05 | Stop reason: SURG

## 2019-11-05 RX ORDER — SODIUM CHLORIDE, SODIUM LACTATE, POTASSIUM CHLORIDE, CALCIUM CHLORIDE 600; 310; 30; 20 MG/100ML; MG/100ML; MG/100ML; MG/100ML
INJECTION, SOLUTION INTRAVENOUS CONTINUOUS PRN
Status: DISCONTINUED | OUTPATIENT
Start: 2019-11-05 | End: 2019-11-05 | Stop reason: SURG

## 2019-11-05 RX ADMIN — DEXAMETHASONE SODIUM PHOSPHATE 10 MG: 10 INJECTION, SOLUTION INTRAMUSCULAR; INTRAVENOUS at 13:56

## 2019-11-05 RX ADMIN — SODIUM CHLORIDE, SODIUM LACTATE, POTASSIUM CHLORIDE, AND CALCIUM CHLORIDE: .6; .31; .03; .02 INJECTION, SOLUTION INTRAVENOUS at 15:09

## 2019-11-05 RX ADMIN — LIDOCAINE HYDROCHLORIDE 50 MG: 10 INJECTION, SOLUTION INFILTRATION; PERINEURAL at 13:56

## 2019-11-05 RX ADMIN — SODIUM CHLORIDE, SODIUM LACTATE, POTASSIUM CHLORIDE, AND CALCIUM CHLORIDE: .6; .31; .03; .02 INJECTION, SOLUTION INTRAVENOUS at 13:49

## 2019-11-05 RX ADMIN — PHENYLEPHRINE HYDROCHLORIDE 200 MCG: 10 INJECTION INTRAVENOUS at 14:28

## 2019-11-05 RX ADMIN — PHENYLEPHRINE HYDROCHLORIDE 60 MCG/MIN: 10 INJECTION INTRAVENOUS at 14:28

## 2019-11-05 RX ADMIN — MIDAZOLAM 2 MG: 1 INJECTION INTRAMUSCULAR; INTRAVENOUS at 13:52

## 2019-11-05 RX ADMIN — EPHEDRINE SULFATE 10 MG: 50 INJECTION, SOLUTION INTRAVENOUS at 14:24

## 2019-11-05 RX ADMIN — SODIUM CHLORIDE, SODIUM LACTATE, POTASSIUM CHLORIDE, AND CALCIUM CHLORIDE 125 ML/HR: .6; .31; .03; .02 INJECTION, SOLUTION INTRAVENOUS at 11:00

## 2019-11-05 RX ADMIN — PROPOFOL 50 MG: 10 INJECTION, EMULSION INTRAVENOUS at 15:18

## 2019-11-05 RX ADMIN — FENTANYL CITRATE 100 MCG: 50 INJECTION, SOLUTION INTRAMUSCULAR; INTRAVENOUS at 13:55

## 2019-11-05 RX ADMIN — Medication 100 MG: at 13:59

## 2019-11-05 RX ADMIN — FENTANYL CITRATE 25 MCG: 50 INJECTION, SOLUTION INTRAMUSCULAR; INTRAVENOUS at 14:20

## 2019-11-05 RX ADMIN — ONDANSETRON 4 MG: 2 INJECTION INTRAMUSCULAR; INTRAVENOUS at 15:16

## 2019-11-05 RX ADMIN — EPHEDRINE SULFATE 5 MG: 50 INJECTION, SOLUTION INTRAVENOUS at 14:28

## 2019-11-05 RX ADMIN — PROPOFOL 200 MG: 10 INJECTION, EMULSION INTRAVENOUS at 13:57

## 2019-11-05 RX ADMIN — PHENYLEPHRINE HYDROCHLORIDE 200 MCG: 10 INJECTION INTRAVENOUS at 14:08

## 2019-11-05 RX ADMIN — EPHEDRINE SULFATE 5 MG: 50 INJECTION, SOLUTION INTRAVENOUS at 14:49

## 2019-11-05 NOTE — ANESTHESIA PREPROCEDURE EVALUATION
Review of Systems/Medical History  Patient summary reviewed  Chart reviewed  No history of anesthetic complications     Cardiovascular  EKG reviewed, Negative cardio ROS Exercise tolerance (METS): >4,    Comment: EKG (2016): Normal sinus rhythm  Possible Left atrial enlargement  Incomplete right bundle branch block  Borderline ECG  ,  Pulmonary  Sleep apnea (Waiting for CPAP machine) Sleep Study completed,        GI/Hepatic  Negative GI/hepatic ROS          Negative  ROS        Endo/Other  History of thyroid disease , hypothyroidism,   Comment: Glaucoma, on eye gtts    GYN    Hysterectomy,        Hematology  Negative hematology ROS      Musculoskeletal    Arthritis     Neurology  Negative neurology ROS      Psychology   Psychiatric history: anxiety/depression  , Anxiety,              Physical Exam    Airway  Comment: Small chin  Mallampati score: III  TM Distance: >3 FB  Neck ROM: full     Dental   No notable dental hx     Cardiovascular  Comment: Negative ROS, Rhythm: regular, Rate: normal, Cardiovascular exam normal    Pulmonary  Pulmonary exam normal Breath sounds clear to auscultation,     Other Findings     Discussed with Dr Lian Chiu about epidural for POPM   Discussed risks and benefits of epidural for POPM with pt and pt wishes to have an epidural placed in case surgeon has to convert to open procedure  General anesthesia risks and benefits also discussed with pt  Consent signed  All questions answered  Anesthesia Plan  ASA Score- 2     Anesthesia Type- general with ASA Monitors  Additional Monitors:   Airway Plan: ETT  Plan Factors-    Induction- intravenous  Postoperative Plan- Plan for postoperative opioid use  Planned trial extubation    Informed Consent- Anesthetic plan and risks discussed with patient  I personally reviewed this patient with the CRNA  Discussed and agreed on the Anesthesia Plan with the CRNA  Cecil Daniels

## 2019-11-05 NOTE — INTERVAL H&P NOTE
H&P reviewed  After examining the patient I find no changes in the patients condition since the H&P had been written      Vitals:    11/05/19 1011   BP: 151/89   Pulse: 85   Resp: 16   Temp: (!) 96 8 °F (36 °C)   SpO2: 98%

## 2019-11-05 NOTE — DISCHARGE INSTRUCTIONS
Hemorrhoidectomy   WHAT YOU NEED TO KNOW:   A hemorrhoidectomy is surgery to remove a hemorrhoid  DISCHARGE INSTRUCTIONS:   Call 911 for any of the following:   · You have trouble breathing  Seek care immediately if:   · Blood soaks through your bandage or underwear  · Your stitches come apart  · You have severe pain in your rectum or abdomen  · You cannot urinate, or you urinate very little  Contact your healthcare provider if:   · You have a fever or chills  · Your pain does not get better after you take pain medicine  · You do not have a bowel movement within 48 hours after surgery  · You have severe pain when you have a bowel movement  · Your wound is red, swollen, or draining pus  · You have nausea or are vomiting  · Your skin is itchy, swollen, or you have a rash  · You have trouble controlling your bowel movements  · You have questions or concerns about your condition or care  Medicines: You may need any of the following:  · Medicine  may be given to decrease pain and swelling  The medicine may come as a pad, cream, or ointment  · Antibiotics  help prevent a bacterial infection  They may be given as a pill or an ointment  · Stool softeners  help prevent constipation  · Laxatives  help you have a bowel movement and prevent constipation  · Prescription pain medicine  may be given  Ask your healthcare provider how to take this medicine safely  Some prescription pain medicines contain acetaminophen  Do not take other medicines that contain acetaminophen without talking to your healthcare provider  Too much acetaminophen may cause liver damage  Prescription pain medicine may cause constipation  Ask your healthcare provider how to prevent or treat constipation  · NSAIDs , such as ibuprofen, help decrease swelling, pain, and fever  NSAIDs can cause stomach bleeding or kidney problems in certain people   If you take blood thinner medicine, always ask your healthcare provider if NSAIDs are safe for you  Always read the medicine label and follow directions  · Take your medicine as directed  Contact your healthcare provider if you think your medicine is not helping or if you have side effects  Tell him or her if you are allergic to any medicine  Keep a list of the medicines, vitamins, and herbs you take  Include the amounts, and when and why you take them  Bring the list or the pill bottles to follow-up visits  Carry your medicine list with you in case of an emergency  Care for your wound as directed:   · Remove your bandage or packing as directed  Carefully wash around the wound with soap and water  It is okay to let soap and water gently run over your incision  Gently pat the area dry  Apply ointment or cream as directed  Put on new, clean bandages as directed  Change your bandages when they get wet or dirty  · Keep your anal area clean  After a bowel movement, wipe with moist towelettes or wet toilet paper  Dry toilet paper can irritate the area  Wear a sanitary pad to absorb bleeding and keep the area clean and dry  Self-care:   · Apply ice on your anus for 15 to 20 minutes every hour or as directed  Use an ice pack, or put crushed ice in a plastic bag  Cover it with a towel before you put it on your skin  Ice helps prevent tissue damage and decreases swelling and pain  · Take a sitz bath  A sitz bath can help decrease pain and swelling  Do this 3 times a day, and after each bowel movement  Fill a bathtub with 4 to 6 inches of warm water  You may also use a sitz bath pan that fits inside a toilet bowl  Sit in the sitz bath for 15 minutes  · Sit on a pillow or a donut-shaped cushion  This helps relieve pressure and pain on your incision  Ask your healthcare provider where to buy a donut-shaped cushion  If you have pain when you sit, lie on your side  · Do not have anal sex  Anal sex can cause your stitches to come apart   Ask your healthcare provider how long you need to follow these instructions  · Do not lift anything heavier than 5 pounds  This can increase pressure in your rectum or anus and cause your incision to come apart  Prevent constipation:  You should try to have a bowel movement within 48 hours after surgery  Constipation can cause pain and put pressure on your incision  Do the following to prevent constipation:  · Drink plenty of liquids  Liquids can help prevent constipation and straining  Ask how much liquid to drink each day and which liquids are best for you  · Eat a variety of high-fiber foods  This will help make it easier to have a bowel movement  Examples include fruits, vegetables, and whole grains  Ask your healthcare provider how much fiber you need each day  You may need to take a fiber supplement  · Exercise as directed  Exercise, such as walking, may make it easier to have a bowel movement  Ask your healthcare provider what exercises are safe for you to do after surgery  Follow up with your healthcare provider as directed:  Write down your questions so you remember to ask them during your visits  © 2017 [address] [facility] Information is for End User's use only and may not be sold, redistributed or otherwise used for commercial purposes  All illustrations and images included in CareNotes® are the copyrighted property of A D A M , Inc  or Jose A Rudolph  The above information is an  only  It is not intended as medical advice for individual conditions or treatments  Talk to your doctor, nurse or pharmacist before following any medical regimen to see if it is safe and effective for you

## 2019-11-05 NOTE — ANESTHESIA POSTPROCEDURE EVALUATION
Post-Op Assessment Note    CV Status:  Stable  Pain Score: 0    Pain management: adequate     Mental Status:  Alert and awake   Hydration Status:  Euvolemic and stable   PONV Controlled:  Controlled   Airway Patency:  Patent   Post Op Vitals Reviewed: Yes      Staff: Anesthesiologist, CRNA   Comments: Report given to recovering RN  VSS   Pt states she is comfortable          /69 (11/05/19 1545)    Temp  97 4   Pulse 86 (11/05/19 1545)   Resp 14 (11/05/19 1545)    SpO2 97 % (11/05/19 1545)

## 2019-11-13 DIAGNOSIS — E03.9 PRIMARY HYPOTHYROIDISM: ICD-10-CM

## 2019-11-13 RX ORDER — LEVOTHYROXINE SODIUM 0.05 MG/1
50 TABLET ORAL DAILY
Qty: 90 TABLET | Refills: 1 | Status: SHIPPED | OUTPATIENT
Start: 2019-11-13 | End: 2019-11-29 | Stop reason: SDUPTHER

## 2019-11-29 DIAGNOSIS — E03.9 PRIMARY HYPOTHYROIDISM: ICD-10-CM

## 2019-12-01 RX ORDER — LEVOTHYROXINE SODIUM 0.05 MG/1
50 TABLET ORAL DAILY
Qty: 90 TABLET | Refills: 1 | Status: SHIPPED | OUTPATIENT
Start: 2019-12-01 | End: 2020-06-09

## 2019-12-17 DIAGNOSIS — F41.9 ANXIETY: ICD-10-CM

## 2019-12-18 DIAGNOSIS — F41.9 ANXIETY: ICD-10-CM

## 2019-12-18 RX ORDER — VENLAFAXINE HYDROCHLORIDE 37.5 MG/1
37.5 CAPSULE, EXTENDED RELEASE ORAL DAILY
Qty: 30 CAPSULE | Refills: 3 | Status: SHIPPED | OUTPATIENT
Start: 2019-12-18 | End: 2019-12-18 | Stop reason: SDUPTHER

## 2019-12-18 RX ORDER — VENLAFAXINE HYDROCHLORIDE 37.5 MG/1
37.5 CAPSULE, EXTENDED RELEASE ORAL DAILY
Qty: 30 CAPSULE | Refills: 3 | Status: SHIPPED | OUTPATIENT
Start: 2019-12-18 | End: 2019-12-19 | Stop reason: SDUPTHER

## 2019-12-19 DIAGNOSIS — F41.9 ANXIETY: ICD-10-CM

## 2019-12-19 RX ORDER — VENLAFAXINE HYDROCHLORIDE 37.5 MG/1
37.5 CAPSULE, EXTENDED RELEASE ORAL DAILY
Qty: 90 CAPSULE | Refills: 1 | Status: SHIPPED | OUTPATIENT
Start: 2019-12-19 | End: 2020-05-18

## 2019-12-19 RX ORDER — VENLAFAXINE HYDROCHLORIDE 37.5 MG/1
37.5 CAPSULE, EXTENDED RELEASE ORAL DAILY
Qty: 30 CAPSULE | Refills: 3 | Status: SHIPPED | OUTPATIENT
Start: 2019-12-19 | End: 2019-12-19 | Stop reason: SDUPTHER

## 2019-12-31 LAB
ALBUMIN SERPL-MCNC: 4.7 G/DL (ref 3.6–4.8)
ALBUMIN/GLOB SERPL: 1.9 {RATIO} (ref 1.2–2.2)
ALP SERPL-CCNC: 95 IU/L (ref 39–117)
ALT SERPL-CCNC: 24 IU/L (ref 0–32)
AST SERPL-CCNC: 22 IU/L (ref 0–40)
BASOPHILS # BLD AUTO: 0.1 X10E3/UL (ref 0–0.2)
BASOPHILS NFR BLD AUTO: 1 %
BILIRUB SERPL-MCNC: 0.4 MG/DL (ref 0–1.2)
BUN SERPL-MCNC: 13 MG/DL (ref 8–27)
BUN/CREAT SERPL: 17 (ref 12–28)
CALCIUM SERPL-MCNC: 9.6 MG/DL (ref 8.7–10.3)
CHLORIDE SERPL-SCNC: 101 MMOL/L (ref 96–106)
CHOLEST SERPL-MCNC: 204 MG/DL (ref 100–199)
CO2 SERPL-SCNC: 24 MMOL/L (ref 20–29)
CREAT SERPL-MCNC: 0.78 MG/DL (ref 0.57–1)
EOSINOPHIL # BLD AUTO: 0.1 X10E3/UL (ref 0–0.4)
EOSINOPHIL NFR BLD AUTO: 2 %
ERYTHROCYTE [DISTWIDTH] IN BLOOD BY AUTOMATED COUNT: 13.3 % (ref 12.3–15.4)
GLOBULIN SER-MCNC: 2.5 G/DL (ref 1.5–4.5)
GLUCOSE SERPL-MCNC: 94 MG/DL (ref 65–99)
HCT VFR BLD AUTO: 40.6 % (ref 34–46.6)
HDLC SERPL-MCNC: 77 MG/DL
HGB BLD-MCNC: 13.4 G/DL (ref 11.1–15.9)
IMM GRANULOCYTES # BLD: 0 X10E3/UL (ref 0–0.1)
IMM GRANULOCYTES NFR BLD: 1 %
LDLC SERPL CALC-MCNC: 110 MG/DL (ref 0–99)
LYMPHOCYTES # BLD AUTO: 1.3 X10E3/UL (ref 0.7–3.1)
LYMPHOCYTES NFR BLD AUTO: 30 %
MCH RBC QN AUTO: 31.6 PG (ref 26.6–33)
MCHC RBC AUTO-ENTMCNC: 33 G/DL (ref 31.5–35.7)
MCV RBC AUTO: 96 FL (ref 79–97)
MICRODELETION SYND BLD/T FISH: NORMAL
MONOCYTES # BLD AUTO: 0.3 X10E3/UL (ref 0.1–0.9)
MONOCYTES NFR BLD AUTO: 7 %
NEUTROPHILS # BLD AUTO: 2.5 X10E3/UL (ref 1.4–7)
NEUTROPHILS NFR BLD AUTO: 59 %
PLATELET # BLD AUTO: 333 X10E3/UL (ref 150–450)
POTASSIUM SERPL-SCNC: 4.9 MMOL/L (ref 3.5–5.2)
PROT SERPL-MCNC: 7.2 G/DL (ref 6–8.5)
RBC # BLD AUTO: 4.24 X10E6/UL (ref 3.77–5.28)
SL AMB EGFR AFRICAN AMERICAN: 95 ML/MIN/1.73
SL AMB EGFR NON AFRICAN AMERICAN: 82 ML/MIN/1.73
SODIUM SERPL-SCNC: 141 MMOL/L (ref 134–144)
T4 FREE SERPL-MCNC: 1.2 NG/DL (ref 0.82–1.77)
TRIGL SERPL-MCNC: 83 MG/DL (ref 0–149)
TSH SERPL DL<=0.005 MIU/L-ACNC: 2.74 UIU/ML (ref 0.45–4.5)
WBC # BLD AUTO: 4.3 X10E3/UL (ref 3.4–10.8)

## 2020-01-06 ENCOUNTER — HOSPITAL ENCOUNTER (OUTPATIENT)
Dept: RADIOLOGY | Facility: HOSPITAL | Age: 60
Discharge: HOME | End: 2020-01-06
Attending: OBSTETRICS & GYNECOLOGY
Payer: COMMERCIAL

## 2020-01-06 VITALS — WEIGHT: 160 LBS | HEIGHT: 66 IN | BODY MASS INDEX: 25.71 KG/M2

## 2020-01-06 DIAGNOSIS — Z12.31 ENCOUNTER FOR SCREENING MAMMOGRAM FOR BREAST CANCER: ICD-10-CM

## 2020-01-06 PROCEDURE — 77067 SCR MAMMO BI INCL CAD: CPT

## 2020-01-06 PROCEDURE — 77063 BREAST TOMOSYNTHESIS BI: CPT

## 2020-01-29 ENCOUNTER — OFFICE VISIT (OUTPATIENT)
Dept: PULMONOLOGY | Facility: MEDICAL CENTER | Age: 60
End: 2020-01-29
Payer: COMMERCIAL

## 2020-01-29 VITALS
SYSTOLIC BLOOD PRESSURE: 142 MMHG | DIASTOLIC BLOOD PRESSURE: 62 MMHG | TEMPERATURE: 99.5 F | WEIGHT: 165 LBS | BODY MASS INDEX: 26.52 KG/M2 | OXYGEN SATURATION: 96 % | RESPIRATION RATE: 12 BRPM | HEART RATE: 77 BPM | HEIGHT: 66 IN

## 2020-01-29 DIAGNOSIS — G47.33 OSA (OBSTRUCTIVE SLEEP APNEA): Primary | ICD-10-CM

## 2020-01-29 PROCEDURE — 99213 OFFICE O/P EST LOW 20 MIN: CPT | Performed by: NURSE PRACTITIONER

## 2020-01-29 NOTE — PROGRESS NOTES
Assessment/Plan:     Problem List Items Addressed This Visit        Respiratory    ALYCIA (obstructive sleep apnea) - Primary (Chronic)     Patient has a diagnosis of obstructive sleep apnea  This 55-year-old female was initially diagnosed in 2007  Apneic hypopnea index was 27 events per hour with oxygen desaturation  He also had a titration study for which she was titrated to 8 cm of water pressure  Patient never received treatment nor a CPAP machine for her diagnosis of moderately severe obstructive sleep apnea  He was ordered a new auto CPAP  I ordered this at a setting of 8-14 cm of water pressure  Her compliance data is reviewed for the last 30 days she has 100% compliant with average usage of 8 hours and 30 minutes  Her average device pressure is 14 cm of water pressure  She has home was no air leak with a average apneic hypopnea index of 21  According to patient she does have a apple in her phone which he monitors frequently  Her AHI has been as high as 35  She does feel more rested but occasionally has snoring  This is improved dramatically  I will change her pressure settings from 12-20 cm of water pressure  Plan includes for patient to evaluate hers AHI daily for approximately 2-3 weeks  If her AHI is above 6 consistently consideration will then be for an in-lab titration study  Patient is agreeable with the same  Relevant Orders    PAP DME Pressure Change            Return in about 1 year (around 1/29/2021)  All questions are answered to the patient's satisfaction and understanding  She verbalizes understanding  She is encouraged to call with any further questions or concerns  Portions of the record may have been created with voice recognition software  Occasional wrong word or "sound a like" substitutions may have occurred due to the inherent limitations of voice recognition software    Read the chart carefully and recognize, using context, where substitutions have occurred  HPI:  N/C is a 49-year-old female who was seen initially in the office October 16, 2019  She had a previous diagnosis of obstructive sleep apnea  Her initial diagnosis was done in September 2007  Apneic hypopnea index was 27 events per hour she also had oxygen desaturation with marked disruption of sleep  She had a titration study done November of 2007 for which she was titrated to 8 cm of water pressure  Since her last visit a new auto see CPAP was ordered  She is currently on auto CPAP 8-14 cm of water pressure  She is here today for compliance  She was fitted with a air fit F 30 size small mask  According to patient she does monitor her apneic hypopnea index per [employer] appy on her phone  She does report that her apneic hypopnea index is sometimes elevated to 35  Patient had never been treated for her obstructive sleep apnea she had been snoring heavily and was encouraged by her family  According to patient she has never had daytime hypersomnolence  She does not snore heavily but does snore on occasion as per her   Electronically Signed by NIK Isaacs    ______________________________________________________________________    Chief Complaint:   Chief Complaint   Patient presents with    Sleep Apnea     pt states no issues with machine        Patient ID: John Nj is a 62 y o  y o  female has a past medical history of Arthritis, Complex cyst of right ovary, Glaucoma, Hyperthyroidism, and Uterus fibroma  1/29/2020  Patient presents today for follow-up visit  HPI    Review of Systems   Constitutional: Negative  HENT: Negative  Eyes: Negative  Respiratory: Negative  Cardiovascular: Negative  Gastrointestinal: Negative  Endocrine: Negative  Genitourinary: Negative  Musculoskeletal: Negative  Allergic/Immunologic: Negative  Neurological: Negative  Hematological: Negative  Psychiatric/Behavioral: Negative          Smoking history: She reports that she has never smoked  She has never used smokeless tobacco     The following portions of the patient's history were reviewed and updated as appropriate: allergies, current medications, past family history, past medical history, past social history, past surgical history and problem list     Immunization History   Administered Date(s) Administered    Influenza, recombinant, quadrivalent,injectable, preservative free 11/20/2018    Tdap 09/06/2007, 05/23/2017     Current Outpatient Medications   Medication Sig Dispense Refill    b complex vitamins tablet Take 1 tablet by mouth daily   calcium carbonate (OS-MARVA) 600 MG tablet Take 600 mg by mouth daily   latanoprost (XALATAN) 0 005 % ophthalmic solution Administer 1 drop to both eyes daily at bedtime   levothyroxine 50 mcg tablet Take 1 tablet (50 mcg total) by mouth daily As directed 90 tablet 1    loratadine (CLARITIN) 10 mg tablet Take 10 mg by mouth daily   Multiple Vitamin (MULTIVITAMIN) tablet Take 1 tablet by mouth daily   venlafaxine (EFFEXOR-XR) 37 5 mg 24 hr capsule Take 1 capsule (37 5 mg total) by mouth daily 90 capsule 1    Vitamin D, Cholecalciferol, 1000 UNITS TABS Take 1,000 Units by mouth daily   EPINEPHrine (EPIPEN) 0 3 mg/0 3 mL SOAJ Inject 0 3 mL (0 3 mg total) into a muscle once for 1 dose (Patient taking differently: Inject 0 3 mg into a muscle as needed ) 0 6 mL 1     No current facility-administered medications for this visit  Allergies: Shellfish-derived products    Objective:  Vitals:    01/29/20 1123   BP: 142/62   BP Location: Left arm   Patient Position: Sitting   Cuff Size: Standard   Pulse: 77   Resp: 12   Temp: 99 5 °F (37 5 °C)   TempSrc: Tympanic   SpO2: 96%   Weight: 74 8 kg (165 lb)   Height: 5' 6" (1 676 m)   Oxygen Therapy  SpO2: 96 %      Wt Readings from Last 3 Encounters:   01/29/20 74 8 kg (165 lb)   01/06/20 72 6 kg (160 lb)   12/20/19 72 6 kg (160 lb)     Body mass index is 26 63 kg/m²  Physical Exam   Constitutional: She is oriented to person, place, and time  She appears well-developed and well-nourished  BMI 26   HENT:   Head: Normocephalic and atraumatic  Eyes: Pupils are equal, round, and reactive to light  EOM are normal    Neck: Normal range of motion  Neck supple  Cardiovascular: Normal rate and regular rhythm  Pulmonary/Chest: Effort normal    Abdominal: Soft  Musculoskeletal: Normal range of motion  Neurological: She is alert and oriented to person, place, and time  Skin: Skin is warm and dry  Capillary refill takes less than 2 seconds  Psychiatric: She has a normal mood and affect   Her behavior is normal        Lab Review:   Orders Only on 12/27/2019   Component Date Value    White Blood Cell Count 12/27/2019 4 3     Red Blood Cell Count 12/27/2019 4 24     Hemoglobin 12/27/2019 13 4     HCT 12/27/2019 40 6     MCV 12/27/2019 96     MCH 12/27/2019 31 6     MCHC 12/27/2019 33 0     RDW 12/27/2019 13 3     Platelet Count 50/35/4629 333     Neutrophils 12/27/2019 59     Lymphocytes 12/27/2019 30     Monocytes 12/27/2019 7     Eosinophils 12/27/2019 2     Basophils PCT 12/27/2019 1     Neutrophils (Absolute) 12/27/2019 2 5     Lymphocytes (Absolute) 12/27/2019 1 3     Monocytes (Absolute) 12/27/2019 0 3     Eosinophils (Absolute) 12/27/2019 0 1     Basophils ABS 12/27/2019 0 1     Immature Granulocytes 12/27/2019 1     Immature Granulocytes (A* 12/27/2019 0 0     Glucose, Random 12/27/2019 94     BUN 12/27/2019 13     Creatinine 12/27/2019 0 78     eGFR Non  12/27/2019 82     eGFR  12/27/2019 95     SL AMB BUN/CREATININE RA* 12/27/2019 17     Sodium 12/27/2019 141     Potassium 12/27/2019 4 9     Chloride 12/27/2019 101     CO2 12/27/2019 24     CALCIUM 12/27/2019 9 6     Protein, Total 12/27/2019 7 2     Albumin 12/27/2019 4 7     Globulin, Total 12/27/2019 2 5     Albumin/Globulin Ratio 2019 1 9     TOTAL BILIRUBIN 2019 0 4     Alk Phos Isoenzymes 2019 95     AST 2019 22     ALT 2019 24     Cholesterol, Total 2019 204*    Triglycerides 2019 83     HDL 2019 77     LDL Direct 2019 110*    Free t4 2019 1 20     TSH 2019 2 740     Interpretation 2019 Note    Admission on 2019, Discharged on 2019   Component Date Value    Case Report 2019                      Value:Surgical Pathology Report                         Case: [id number]                                   Authorizing Provider:  Cristhian Stafford MD       Collected:           2019 1425              Ordering Location:     [address] [place] [address]      Received:            2019 [address] [place] [address]                                                      Pathologist:           Sadia Garcia MD                                                        Specimen:    Hemorrhoids                                                                                Final Diagnosis 2019                      Value: This result contains rich text formatting which cannot be displayed here   Additional Information 2019                      Value: This result contains rich text formatting which cannot be displayed here  Beau [facility]ne Gross Description 2019                      Value: This result contains rich text formatting which cannot be displayed here         Past Surgical History:   Procedure Laterality Date     SECTION      X2    COLONOSCOPY      HYSTERECTOMY      LAPAROTOMY N/A 2016    Procedure: LAPAROTOMY EXPLORATORY; OVARIAN CANCER STAGING ;  Surgeon: Radha Mitchell MD;  [facility];  Service:    Vandana CARDENAS HEMORRHOIDECTOMY,INT/EXT, 2+ COLUMNS/GROUPS N/A 2019    Procedure: HEMORRHOIDECTOMY EXCISION;  Surgeon: Cristhian Stafford MD;  Location: [facility]; Service: Colorectal    CA LAPAROSCOPY W TOT HYSTERECTUTERUS <=250 GRAM  W TUBE/OVARY N/A 9/12/2016    Procedure: HYSTERECTOMY LAPAROSCOPIC TOTAL (LTH),cystoscopy, prostoctopy;  Surgeon: Kae Martin MD;  [facility];  Service: Gynecology Oncology    TUBAL LIGATION          Family History   Problem Relation Age of Onset    Heart disease Mother     Diabetes Father     No Known Problems Sister     Seizures Brother     Heart disease Brother     No Known Problems Daughter     No Known Problems Maternal Grandmother     No Known Problems Maternal Grandfather     No Known Problems Paternal Grandmother     No Known Problems Paternal Grandfather     Cancer Brother         lump on neck    No Known Problems Daughter     No Known Problems Maternal Aunt     No Known Problems Maternal Uncle     No Known Problems Paternal Aunt     No Known Problems Paternal Uncle     Breast cancer additional onset Neg Hx     Mental illness Neg Hx         Diagnostics:  I have personally reviewed pertinent reports  Office Spirometry Results:     ESS:    Mammo Screening Bilateral W 3d & Cad    Result Date: 1/6/2020  Narrative: DIAGNOSIS: Encounter for screening mammogram for breast cancer TECHNIQUE: Digital screening mammography was performed  Computer Aided Detection (CAD) analyzed all applicable images  COMPARISONS: Prior breast imaging dated: 11/07/2018, 11/07/2018, 10/24/2017, 10/17/2016, 09/03/2015, and 07/28/2014 RELEVANT HISTORY: Family Breast Cancer History: No known family history of breast cancer  Family Medical History: No known relevant family medical history  Personal History: No known relevant hormone history  Surgical history includes hysterectomy and oophorectomy  No known relevant medical history  The patient is scheduled in a reminder system for screening mammography  8-10% of cancers will be missed on mammography  Management of a palpable abnormality must be based on clinical grounds    Patients will be notified of their results via letter from our facility  Accredited by [address] [place] [address] of Radiology and FDA  RISK ASSESSMENT: 5 Year Tyrer-Cuzick: 2 12 % 10 Year Tyrer-Cuzick: 4 25 % Lifetime Tyrer-Cuzick: 10 15 % TISSUE DENSITY: The breasts are heterogeneously dense, which may obscure small masses  INDICATION: Hayden Acosta is a 62 y o  female presenting for screening mammography  FINDINGS: Bilateral There are no suspicious masses, grouped microcalcifications or areas of architectural distortion  The skin and nipple areolar complex are unremarkable  Impression: No mammographic evidence of malignancy  ASSESSMENT/BI-RADS CATEGORY: Left: 2 - Benign Right: 2 - Benign Overall: 2 - Benign RECOMMENDATION:      - Routine screening mammogram in 1 year for both breasts   Workstation ID: [id number]

## 2020-01-29 NOTE — PATIENT INSTRUCTIONS
Sleep Apnea   AMBULATORY CARE:   Sleep apnea  is also called obstructive sleep apnea  It is a condition that causes you to stop breathing for 10 seconds or more while you are sleeping  During normal sleep, your throat is kept open by muscles that let air pass through easily  Sleep apnea causes the muscles and tissues around your throat to relax and block air from passing through  Sleep apnea may happen many times while you are asleep  Common symptoms include the following:   · No signs of breathing for 10 seconds or more while you sleep    · Snoring loudly, snorting, gasping or choking while you sleep, and waking up suddenly because of these    · A hard time thinking, remembering things, or focusing on your tasks the following day    · Headache or nausea    · Bedwetting or waking up often during the night to urinate    · Feeling sleepy, slow, and tired during the day  Seek care immediately if:   · You have chest pain or trouble breathing  Contact your healthcare provider if:   · You feel tired or depressed  · You have trouble staying awake during the day  · You have trouble thinking clearly  · You have questions or concerns about your condition or care  Treatment for sleep apnea  includes using a continuous positive airway pressure (CPAP) machine to keep your airway open during sleep  A mask is placed over your nose and mouth, or just your nose  The mask is hooked to the CPAP machine that blows a gentle stream of air into the mask when you breathe  This helps keep your airway open so you can breathe more regularly  Extra oxygen may be given to you through the machine  You may be given a mouth device  It looks like a mouth guard or dental retainer and stops your tongue and mouth tissues from blocking your throat while you sleep  Surgery may be needed to remove extra tissues that block your mouth, throat, or nose  Manage sleep apnea:   · Do not smoke    Nicotine and other chemicals in cigarettes and cigars can cause lung damage  Ask your healthcare provider for information if you currently smoke and need help to quit  E-cigarettes or smokeless tobacco still contain nicotine  Talk to your healthcare provider before you use these products  · Do not drink alcohol or take sedative medicine before you go to sleep  Alcohol and sedatives can relax the muscles and tissues around your throat  This can block the airflow to your lungs  · Maintain a healthy weight  Excess tissue around your throat may restrict your breathing  Ask your healthcare provider for information if you need to lose weight  · Sleep on your side or use pillows designed to prevent sleep apnea  This prevents your tongue or other tissues from blocking your throat  You can also raise the head of your bed  Follow up with your healthcare provider as directed:  Write down your questions so you remember to ask them during your visits  © 2017 [address] [employer] Information is for End User's use only and may not be sold, redistributed or otherwise used for commercial purposes  All illustrations and images included in CareNotes® are the copyrighted property of A D A M , Inc  or Jose A Rudolph  The above information is an  only  It is not intended as medical advice for individual conditions or treatments  Talk to your doctor, nurse or pharmacist before following any medical regimen to see if it is safe and effective for you  I will send a script to change her pressure from 12-20 cm of water pressure for year CPAP  Instructions include your looking at the AHI in number for approximately 2 weeks  Please call me in 2-3 weeks with pressure readings  At that point we will decide if we keep you on auto CPAP 12 20 cm of water pressure  For the possibility of an in-lab treatment study

## 2020-01-29 NOTE — ASSESSMENT & PLAN NOTE
Patient has a diagnosis of obstructive sleep apnea  This 57-year-old female was initially diagnosed in 2007  Apneic hypopnea index was 27 events per hour with oxygen desaturation  He also had a titration study for which she was titrated to 8 cm of water pressure  Patient never received treatment nor a CPAP machine for her diagnosis of moderately severe obstructive sleep apnea  He was ordered a new auto CPAP  I ordered this at a setting of 8-14 cm of water pressure  Her compliance data is reviewed for the last 30 days she has 100% compliant with average usage of 8 hours and 30 minutes  Her average device pressure is 14 cm of water pressure  She has home was no air leak with a average apneic hypopnea index of 21  According to patient she does have a apple in her phone which he monitors frequently  Her AHI has been as high as 35  She does feel more rested but occasionally has snoring  This is improved dramatically  I will change her pressure settings from 12-20 cm of water pressure  Plan includes for patient to evaluate hers AHI daily for approximately 2-3 weeks  If her AHI is above 6 consistently consideration will then be for an in-lab titration study  Patient is agreeable with the same

## 2020-02-17 ENCOUNTER — TELEPHONE (OUTPATIENT)
Dept: PULMONOLOGY | Facility: MEDICAL CENTER | Age: 60
End: 2020-02-17

## 2020-02-17 DIAGNOSIS — G47.33 OSA (OBSTRUCTIVE SLEEP APNEA): Primary | ICD-10-CM

## 2020-02-17 NOTE — PROGRESS NOTES
Josepopeye Polk has been using Auto CPAP for some time  She is very tired and does not feel that her current machine is not working at [employer] 12-20 cm H2O pressure

## 2020-02-17 NOTE — TELEPHONE ENCOUNTER
Please call the patient    You changed her CPAP pressure and she is having issues with it   Would like to speak to you

## 2020-02-17 NOTE — TELEPHONE ENCOUNTER
I just ordered a in lab treatment study  Her current auto CPAp 12-20cm H2O pressure is not working  Can you explain to her how she will be notified if and when approved    Thanks,

## 2020-03-23 ENCOUNTER — TELEPHONE (OUTPATIENT)
Dept: SLEEP CENTER | Facility: CLINIC | Age: 60
End: 2020-03-23

## 2020-03-24 NOTE — PROGRESS NOTES
I left message with [facility] re: cancellation  Patient has been using auto CPAP  I spoke to patient February 17, 2020     Likely this will be rescheduled when this [facility] resumes this service

## 2020-05-17 DIAGNOSIS — F41.9 ANXIETY: ICD-10-CM

## 2020-05-18 RX ORDER — VENLAFAXINE HYDROCHLORIDE 37.5 MG/1
CAPSULE, EXTENDED RELEASE ORAL
Qty: 90 CAPSULE | Refills: 1 | Status: SHIPPED | OUTPATIENT
Start: 2020-05-18 | End: 2020-11-02

## 2020-06-08 DIAGNOSIS — E03.9 PRIMARY HYPOTHYROIDISM: ICD-10-CM

## 2020-06-09 RX ORDER — LEVOTHYROXINE SODIUM 0.05 MG/1
TABLET ORAL
Qty: 90 TABLET | Refills: 1 | Status: SHIPPED | OUTPATIENT
Start: 2020-06-09 | End: 2020-11-23

## 2020-06-10 PROBLEM — K64.9 HEMORRHOID: Status: RESOLVED | Noted: 2018-11-20 | Resolved: 2020-06-10

## 2020-06-10 PROBLEM — M79.671 PAIN IN BOTH FEET: Status: RESOLVED | Noted: 2018-03-13 | Resolved: 2020-06-10

## 2020-06-10 PROBLEM — M79.672 PAIN IN BOTH FEET: Status: RESOLVED | Noted: 2018-03-13 | Resolved: 2020-06-10

## 2020-06-10 PROBLEM — K64.9 HEMORRHOIDS: Status: RESOLVED | Noted: 2019-10-25 | Resolved: 2020-06-10

## 2020-06-15 ENCOUNTER — OFFICE VISIT (OUTPATIENT)
Dept: FAMILY MEDICINE CLINIC | Facility: CLINIC | Age: 60
End: 2020-06-15
Payer: COMMERCIAL

## 2020-06-15 ENCOUNTER — TELEPHONE (OUTPATIENT)
Dept: FAMILY MEDICINE CLINIC | Facility: CLINIC | Age: 60
End: 2020-06-15

## 2020-06-15 VITALS
BODY MASS INDEX: 27.16 KG/M2 | HEIGHT: 66 IN | RESPIRATION RATE: 16 BRPM | SYSTOLIC BLOOD PRESSURE: 120 MMHG | HEART RATE: 76 BPM | DIASTOLIC BLOOD PRESSURE: 70 MMHG | WEIGHT: 169 LBS | TEMPERATURE: 98.9 F | OXYGEN SATURATION: 96 %

## 2020-06-15 DIAGNOSIS — Z13.0 SCREENING FOR DEFICIENCY ANEMIA: ICD-10-CM

## 2020-06-15 DIAGNOSIS — Z00.00 ANNUAL PHYSICAL EXAM: Primary | ICD-10-CM

## 2020-06-15 DIAGNOSIS — Z13.6 SCREENING FOR CARDIOVASCULAR CONDITION: ICD-10-CM

## 2020-06-15 DIAGNOSIS — E03.9 PRIMARY HYPOTHYROIDISM: ICD-10-CM

## 2020-06-15 PROCEDURE — 99396 PREV VISIT EST AGE 40-64: CPT | Performed by: FAMILY MEDICINE

## 2020-06-15 PROCEDURE — 3008F BODY MASS INDEX DOCD: CPT | Performed by: FAMILY MEDICINE

## 2020-06-15 RX ORDER — CALCIUM CARBONATE 300MG(750)
TABLET,CHEWABLE ORAL
COMMUNITY

## 2020-10-29 LAB
ALBUMIN SERPL-MCNC: 4.7 G/DL (ref 3.8–4.8)
ALBUMIN/GLOB SERPL: 1.9 {RATIO} (ref 1.2–2.2)
ALP SERPL-CCNC: 91 IU/L (ref 39–117)
ALT SERPL-CCNC: 45 IU/L (ref 0–32)
AST SERPL-CCNC: 32 IU/L (ref 0–40)
BASOPHILS # BLD AUTO: 0.1 X10E3/UL (ref 0–0.2)
BASOPHILS NFR BLD AUTO: 1 %
BILIRUB SERPL-MCNC: 0.5 MG/DL (ref 0–1.2)
BUN SERPL-MCNC: 13 MG/DL (ref 8–27)
BUN/CREAT SERPL: 21 (ref 12–28)
CALCIUM SERPL-MCNC: 9.4 MG/DL (ref 8.7–10.3)
CHLORIDE SERPL-SCNC: 101 MMOL/L (ref 96–106)
CHOLEST SERPL-MCNC: 211 MG/DL (ref 100–199)
CO2 SERPL-SCNC: 28 MMOL/L (ref 20–29)
CREAT SERPL-MCNC: 0.63 MG/DL (ref 0.57–1)
EOSINOPHIL # BLD AUTO: 0.1 X10E3/UL (ref 0–0.4)
EOSINOPHIL NFR BLD AUTO: 2 %
ERYTHROCYTE [DISTWIDTH] IN BLOOD BY AUTOMATED COUNT: 11.7 % (ref 11.7–15.4)
GLOBULIN SER-MCNC: 2.5 G/DL (ref 1.5–4.5)
GLUCOSE SERPL-MCNC: 94 MG/DL (ref 65–99)
HCT VFR BLD AUTO: 38.9 % (ref 34–46.6)
HDLC SERPL-MCNC: 79 MG/DL
HGB BLD-MCNC: 13.3 G/DL (ref 11.1–15.9)
IMM GRANULOCYTES # BLD: 0 X10E3/UL (ref 0–0.1)
IMM GRANULOCYTES NFR BLD: 0 %
LDLC SERPL CALC-MCNC: 115 MG/DL (ref 0–99)
LYMPHOCYTES # BLD AUTO: 1.2 X10E3/UL (ref 0.7–3.1)
LYMPHOCYTES NFR BLD AUTO: 28 %
MCH RBC QN AUTO: 32.5 PG (ref 26.6–33)
MCHC RBC AUTO-ENTMCNC: 34.2 G/DL (ref 31.5–35.7)
MCV RBC AUTO: 95 FL (ref 79–97)
MICRODELETION SYND BLD/T FISH: NORMAL
MONOCYTES # BLD AUTO: 0.3 X10E3/UL (ref 0.1–0.9)
MONOCYTES NFR BLD AUTO: 8 %
NEUTROPHILS # BLD AUTO: 2.5 X10E3/UL (ref 1.4–7)
NEUTROPHILS NFR BLD AUTO: 61 %
PLATELET # BLD AUTO: 285 X10E3/UL (ref 150–450)
POTASSIUM SERPL-SCNC: 4.8 MMOL/L (ref 3.5–5.2)
PROT SERPL-MCNC: 7.2 G/DL (ref 6–8.5)
RBC # BLD AUTO: 4.09 X10E6/UL (ref 3.77–5.28)
SL AMB EGFR AFRICAN AMERICAN: 111 ML/MIN/1.73
SL AMB EGFR NON AFRICAN AMERICAN: 96 ML/MIN/1.73
SODIUM SERPL-SCNC: 139 MMOL/L (ref 134–144)
TRIGL SERPL-MCNC: 97 MG/DL (ref 0–149)
TSH SERPL DL<=0.005 MIU/L-ACNC: 3.28 UIU/ML (ref 0.45–4.5)
WBC # BLD AUTO: 4.1 X10E3/UL (ref 3.4–10.8)

## 2020-10-30 ENCOUNTER — TELEPHONE (OUTPATIENT)
Dept: FAMILY MEDICINE CLINIC | Facility: CLINIC | Age: 60
End: 2020-10-30

## 2020-10-30 DIAGNOSIS — F41.9 ANXIETY: ICD-10-CM

## 2020-10-30 DIAGNOSIS — R74.01 ELEVATED ALT MEASUREMENT: Primary | ICD-10-CM

## 2020-11-02 RX ORDER — VENLAFAXINE HYDROCHLORIDE 37.5 MG/1
CAPSULE, EXTENDED RELEASE ORAL
Qty: 90 CAPSULE | Refills: 1 | Status: SHIPPED | OUTPATIENT
Start: 2020-11-02 | End: 2021-04-12

## 2020-11-21 DIAGNOSIS — E03.9 PRIMARY HYPOTHYROIDISM: ICD-10-CM

## 2020-11-23 RX ORDER — LEVOTHYROXINE SODIUM 0.05 MG/1
TABLET ORAL
Qty: 90 TABLET | Refills: 3 | Status: SHIPPED | OUTPATIENT
Start: 2020-11-23 | End: 2021-12-30

## 2021-01-11 NOTE — PROGRESS NOTES
Assessment/Plan:  NGE    s/p [facility]-BSO 9/16  Dyspareunia/AV- continue with over-the-counter lubricant  Feels hot all the time, continued hot flashes and night sweats along with a 17 lb weight gain in the past year which may act is insulation  She is much more active  She had a sedentary job  Black cohosh 20 mg b i d  Co- Testing - NA    RTO 1 yr     SBE monthly  3 D Mammography   Colonoscopy- 1 polyp '15, due now  Exercise 3/wk - adequate now, works FT, lost 6 #'s '18, gained 5 '19, 17 '20  Calcium 1,000 mg/d with Vit D           Diagnoses and all orders for this visit:    Encounter for annual routine gynecological examination    Encounter for screening mammogram for breast cancer  -     Mammo screening bilateral w 3d & cad; Future    Atrophic vaginitis    History of total hysterectomy with bilateral salpingo-oophorectomy (BSO)              Subjective:        Patient ID: Rosanne García is a 56 y o  female  Angel Mcdonnell presents today for a yearly evaluation  She is complaining of hot flashes and night sweats  She feels hot all the time  She is having 2 night sweats per day and around 6 hot flashes  She retired in March of last year just before the shut down  She had a sedentary job  Since then she has been very active at home  There having more intercourse than before, currently 4 times a week  She has gained 17 lb and admits to poor eating habits  She loves sweets and she loves to bake  She plans on addressing this  She is already on Effexor 37 5 mg daily for anxiety  We discussed hot flashes and night sweats  Effexor is used off label to treat menopausal symptoms  We discussed using black cohosh 20 mg b i d  for 6 months to see if this has an affect on her symptoms  The 17 lb has an insulation affect and her increased activity is also contributing to her feeling hot        The following portions of the patient's history were reviewed and updated as appropriate: She  has a past medical history of Arthritis, Complex cyst of right ovary, Glaucoma, Hyperthyroidism, and Uterus fibroma  Patient Active Problem List    Diagnosis Date Noted    Elevated ALT measurement 10/30/2020    Encounter for annual routine gynecological examination 2018    Encounter for screening mammogram for breast cancer 2018    Acquired deformity of foot 2018    Valgus deformity of great toe 2018    Congenital pes planus of right foot 2018    Congenital pes planus of left foot 2018    Goiter 2017    Adnexal mass 2016    Anxiety 2016    Primary hypothyroidism 2016    ALYCIA (obstructive sleep apnea) 2016    Allergic rhinitis 2014   PMH:   2 para 2; C/S X 2- 1st CPD/OP, 2nd Repeat- ,   Hyperthyroidism with goiter   Anxiety  Uterine fibroid  LTL   Menopause -   Hypothyroidism   Colonoscopy 4/15 one polyp  Heather  wnl  Dyspareunia, AV      Complex Pelvic Mass- TL H, BSO, AMINTA  Cystoscopy, proctoscopy- endometriosis, extensive adhesions, 6 2 right ovarian     serous cystadenofibroma, simple cystic endometrial hyperplasia with mild atypia  16         ALYCIA        Avulsion fracture right ankle, sprained twice- summer '19       Hemorrhoidectomy   She  has a past surgical history that includes  section; Tubal ligation; pr laparoscopy w tot hysterectuterus <=250 gram  w tube/ovary (N/A, 2016); LAPAROTOMY (N/A, 2016); Colonoscopy; Hysterectomy; pr hemorrhoidectomy,int/ext, 2+ columns/groups (N/A, 2019); and Oophorectomy  Her family history includes Cancer in her brother; Diabetes in her father; Heart disease in her brother and mother; No Known Problems in her daughter, daughter, maternal aunt, maternal grandfather, maternal grandmother, maternal uncle, paternal aunt, paternal grandfather, paternal grandmother, paternal uncle, and sister; Seizures in her brother     FH:  Brother 67 with tonsil or tongue cancer   F d 80  M- Uterine Ca 93, d 95 of natural causes, HTN  PGM- MI  PGF - DM  B- Pneumonia, Cardiac arrest d 69   She  reports that she has never smoked  She has never used smokeless tobacco  She reports current alcohol use  She reports that she does not use drugs  SH:  '81  Chelsi- in March '18 she had her 31st anniversary at work  Thinking of retiring next May  Her  already is retired  Current Outpatient Medications   Medication Sig Dispense Refill    b complex vitamins tablet Take 1 tablet by mouth daily   calcium carbonate (OS-MARVA) 600 MG tablet Take 600 mg by mouth daily   EPINEPHrine (EPIPEN) 0 3 mg/0 3 mL SOAJ Inject 0 3 mL (0 3 mg total) into a muscle once for 1 dose (Patient taking differently: Inject 0 3 mg into a muscle as needed ) 0 6 mL 1    latanoprost (XALATAN) 0 005 % ophthalmic solution Administer 1 drop to both eyes daily at bedtime   levothyroxine 50 mcg tablet TAKE 1 TABLET BY MOUTH  DAILY AS DIRECTED 90 tablet 3    loratadine (CLARITIN) 10 mg tablet Take 10 mg by mouth daily   Magnesium 400 MG TABS       Multiple Vitamin (MULTIVITAMIN) tablet Take 1 tablet by mouth daily   venlafaxine (EFFEXOR-XR) 37 5 mg 24 hr capsule TAKE 1 CAPSULE BY MOUTH  DAILY 90 capsule 1    Vitamin D, Cholecalciferol, 1000 UNITS TABS Take 1,000 Units by mouth daily  No current facility-administered medications for this visit  Current Outpatient Medications on File Prior to Visit   Medication Sig    b complex vitamins tablet Take 1 tablet by mouth daily   calcium carbonate (OS-MARVA) 600 MG tablet Take 600 mg by mouth daily   EPINEPHrine (EPIPEN) 0 3 mg/0 3 mL SOAJ Inject 0 3 mL (0 3 mg total) into a muscle once for 1 dose (Patient taking differently: Inject 0 3 mg into a muscle as needed )    latanoprost (XALATAN) 0 005 % ophthalmic solution Administer 1 drop to both eyes daily at bedtime        levothyroxine 50 mcg tablet TAKE 1 TABLET BY MOUTH  DAILY AS DIRECTED    loratadine (CLARITIN) 10 mg tablet Take 10 mg by mouth daily   Magnesium 400 MG TABS     Multiple Vitamin (MULTIVITAMIN) tablet Take 1 tablet by mouth daily   venlafaxine (EFFEXOR-XR) 37 5 mg 24 hr capsule TAKE 1 CAPSULE BY MOUTH  DAILY    Vitamin D, Cholecalciferol, 1000 UNITS TABS Take 1,000 Units by mouth daily  No current facility-administered medications on file prior to visit  She is allergic to shellfish-derived products       Review of Systems   Constitutional: Positive for unexpected weight change (Increased 17 lb)  Negative for activity change, appetite change, chills, fatigue and fever  Hot flashes and night sweats   HENT: Negative for congestion, rhinorrhea, sinus pressure, sore throat and trouble swallowing  Eyes: Negative for discharge, redness, itching and visual disturbance  Respiratory: Negative for cough, chest tightness, shortness of breath and wheezing  Cardiovascular: Negative for chest pain, palpitations and leg swelling  Gastrointestinal: Negative for abdominal distention, abdominal pain, blood in stool, constipation, diarrhea, nausea and vomiting  Endocrine:        Feels hot all the time   Genitourinary: Negative for decreased urine volume, difficulty urinating, dyspareunia, dysuria, frequency, hematuria, menstrual problem, pelvic pain, urgency, vaginal bleeding, vaginal discharge and vaginal pain  Sex 4 times a week   Musculoskeletal: Negative for arthralgias  Skin: Negative for rash  Neurological: Negative for weakness, light-headedness, numbness and headaches  Hematological: Does not bruise/bleed easily  Psychiatric/Behavioral: Negative for agitation, behavioral problems and sleep disturbance  The patient is not nervous/anxious  Objective: There were no vitals filed for this visit  Physical Exam  Vitals signs and nursing note reviewed     Constitutional:       General: She is not in acute distress  Appearance: She is well-developed  HENT:      Head: Normocephalic and atraumatic  Eyes:      General: No scleral icterus  Right eye: No discharge  Left eye: No discharge  Extraocular Movements: Extraocular movements intact  Conjunctiva/sclera: Conjunctivae normal    Neck:      Musculoskeletal: Normal range of motion and neck supple  Thyroid: No thyromegaly  Trachea: No tracheal deviation  Cardiovascular:      Rate and Rhythm: Normal rate and regular rhythm  Heart sounds: Normal heart sounds  No murmur  Pulmonary:      Effort: Pulmonary effort is normal  No respiratory distress  Breath sounds: Normal breath sounds  No wheezing  Chest:      Breasts: Breasts are symmetrical          Right: No inverted nipple, mass, nipple discharge, skin change or tenderness  Left: No inverted nipple, mass, nipple discharge, skin change or tenderness  Abdominal:      General: Bowel sounds are normal  There is no distension  Palpations: Abdomen is soft  There is no mass  Tenderness: There is no abdominal tenderness  Genitourinary:     General: Normal vulva  Labia:         Right: No rash, tenderness or lesion  Left: No rash, tenderness or lesion  Vagina: Normal  No vaginal discharge  Adnexa:         Right: No mass, tenderness or fullness  Left: No mass, tenderness or fullness  Rectum: No external hemorrhoid  Comments: Urethral meatus within normal limits  Perineum within normal limits  Bladder well supported  Uterus and cervix surgically removed  Physiologic vaginal atrophy  Musculoskeletal: Normal range of motion  Lymphadenopathy:      Cervical: No cervical adenopathy  Skin:     General: Skin is warm and dry  Neurological:      Mental Status: She is alert and oriented to person, place, and time  Psychiatric:         Behavior: Behavior normal          Thought Content:  Thought content normal          Judgment: Judgment normal

## 2021-01-12 ENCOUNTER — ANNUAL EXAM (OUTPATIENT)
Dept: GYNECOLOGY | Facility: CLINIC | Age: 61
End: 2021-01-12
Payer: COMMERCIAL

## 2021-01-12 VITALS
SYSTOLIC BLOOD PRESSURE: 120 MMHG | HEIGHT: 66 IN | BODY MASS INDEX: 27.9 KG/M2 | WEIGHT: 173.6 LBS | DIASTOLIC BLOOD PRESSURE: 72 MMHG

## 2021-01-12 DIAGNOSIS — Z90.79 HISTORY OF TOTAL HYSTERECTOMY WITH BILATERAL SALPINGO-OOPHORECTOMY (BSO): ICD-10-CM

## 2021-01-12 DIAGNOSIS — Z12.31 ENCOUNTER FOR SCREENING MAMMOGRAM FOR BREAST CANCER: ICD-10-CM

## 2021-01-12 DIAGNOSIS — Z90.722 HISTORY OF TOTAL HYSTERECTOMY WITH BILATERAL SALPINGO-OOPHORECTOMY (BSO): ICD-10-CM

## 2021-01-12 DIAGNOSIS — Z90.710 HISTORY OF TOTAL HYSTERECTOMY WITH BILATERAL SALPINGO-OOPHORECTOMY (BSO): ICD-10-CM

## 2021-01-12 DIAGNOSIS — N95.2 ATROPHIC VAGINITIS: ICD-10-CM

## 2021-01-12 DIAGNOSIS — Z01.419 ENCOUNTER FOR ANNUAL ROUTINE GYNECOLOGICAL EXAMINATION: Primary | ICD-10-CM

## 2021-01-12 PROCEDURE — G0101 CA SCREEN;PELVIC/BREAST EXAM: HCPCS | Performed by: OBSTETRICS & GYNECOLOGY

## 2021-01-12 RX ORDER — PROPRANOLOL/HYDROCHLOROTHIAZID 40 MG-25MG
TABLET ORAL
COMMUNITY
Start: 2020-01-11

## 2021-01-14 ENCOUNTER — PREP FOR PROCEDURE (OUTPATIENT)
Dept: GASTROENTEROLOGY | Facility: CLINIC | Age: 61
End: 2021-01-14

## 2021-01-14 ENCOUNTER — HOSPITAL ENCOUNTER (OUTPATIENT)
Dept: RADIOLOGY | Facility: HOSPITAL | Age: 61
Discharge: HOME | End: 2021-01-14
Attending: OBSTETRICS & GYNECOLOGY
Payer: COMMERCIAL

## 2021-01-14 VITALS — HEIGHT: 66 IN | BODY MASS INDEX: 27.32 KG/M2 | WEIGHT: 170 LBS

## 2021-01-14 DIAGNOSIS — Z86.010 HISTORY OF COLON POLYPS: Primary | ICD-10-CM

## 2021-01-14 DIAGNOSIS — Z12.31 ENCOUNTER FOR SCREENING MAMMOGRAM FOR BREAST CANCER: ICD-10-CM

## 2021-01-14 PROCEDURE — 77067 SCR MAMMO BI INCL CAD: CPT

## 2021-01-14 PROCEDURE — 77063 BREAST TOMOSYNTHESIS BI: CPT

## 2021-01-22 ENCOUNTER — APPOINTMENT (OUTPATIENT)
Dept: LAB | Facility: HOSPITAL | Age: 61
End: 2021-01-22
Payer: COMMERCIAL

## 2021-01-22 DIAGNOSIS — Z86.010 HISTORY OF COLON POLYPS: ICD-10-CM

## 2021-01-22 PROCEDURE — U0003 INFECTIOUS AGENT DETECTION BY NUCLEIC ACID (DNA OR RNA); SEVERE ACUTE RESPIRATORY SYNDROME CORONAVIRUS 2 (SARS-COV-2) (CORONAVIRUS DISEASE [COVID-19]), AMPLIFIED PROBE TECHNIQUE, MAKING USE OF HIGH THROUGHPUT TECHNOLOGIES AS DESCRIBED BY CMS-2020-01-R: HCPCS

## 2021-01-22 PROCEDURE — U0005 INFEC AGEN DETEC AMPLI PROBE: HCPCS

## 2021-01-23 LAB — SARS-COV-2 RNA RESP QL NAA+PROBE: NEGATIVE

## 2021-01-26 ENCOUNTER — ANESTHESIA EVENT (OUTPATIENT)
Dept: GASTROENTEROLOGY | Facility: AMBULARY SURGERY CENTER | Age: 61
End: 2021-01-26

## 2021-01-26 NOTE — PRE-PROCEDURE INSTRUCTIONS
Pre-Surgery Instructions:   Medication Instructions    b complex vitamins tablet Patient was instructed by Physician and understands   calcium carbonate (OS-MARVA) 600 MG tablet Patient was instructed by Physician and understands   EPINEPHrine (EPIPEN) 0 3 mg/0 3 mL SOAJ Patient was instructed by Physician and understands   latanoprost (XALATAN) 0 005 % ophthalmic solution Instructed patient per Anesthesia Guidelines   levothyroxine 50 mcg tablet Instructed patient per Anesthesia Guidelines   loratadine (CLARITIN) 10 mg tablet Patient was instructed by Physician and understands   Magnesium 400 MG TABS Patient was instructed by Physician and understands   Multiple Vitamin (MULTIVITAMIN) tablet Patient was instructed by Physician and understands   Turmeric 500 MG CAPS Patient was instructed by Physician and understands   venlafaxine (EFFEXOR-XR) 37 5 mg 24 hr capsule Patient was instructed by Physician and understands   Vitamin D, Cholecalciferol, 1000 UNITS TABS Patient was instructed by Physician and understands

## 2021-01-28 ENCOUNTER — ANESTHESIA (OUTPATIENT)
Dept: GASTROENTEROLOGY | Facility: AMBULARY SURGERY CENTER | Age: 61
End: 2021-01-28

## 2021-01-28 ENCOUNTER — HOSPITAL ENCOUNTER (OUTPATIENT)
Dept: GASTROENTEROLOGY | Facility: AMBULARY SURGERY CENTER | Age: 61
Setting detail: OUTPATIENT SURGERY
Discharge: HOME | End: 2021-01-28
Attending: INTERNAL MEDICINE
Payer: COMMERCIAL

## 2021-01-28 VITALS
SYSTOLIC BLOOD PRESSURE: 116 MMHG | RESPIRATION RATE: 16 BRPM | DIASTOLIC BLOOD PRESSURE: 62 MMHG | OXYGEN SATURATION: 98 % | TEMPERATURE: 97.7 F | HEART RATE: 64 BPM

## 2021-01-28 VITALS — HEART RATE: 75 BPM

## 2021-01-28 DIAGNOSIS — Z86.010 HISTORY OF COLON POLYPS: ICD-10-CM

## 2021-01-28 PROCEDURE — 88305 TISSUE EXAM BY PATHOLOGIST: CPT | Performed by: PATHOLOGY

## 2021-01-28 PROCEDURE — 45385 COLONOSCOPY W/LESION REMOVAL: CPT | Performed by: INTERNAL MEDICINE

## 2021-01-28 RX ORDER — PROPOFOL 10 MG/ML
INJECTION, EMULSION INTRAVENOUS AS NEEDED
Status: DISCONTINUED | OUTPATIENT
Start: 2021-01-28 | End: 2021-01-28

## 2021-01-28 RX ORDER — LIDOCAINE HYDROCHLORIDE 10 MG/ML
INJECTION, SOLUTION EPIDURAL; INFILTRATION; INTRACAUDAL; PERINEURAL AS NEEDED
Status: DISCONTINUED | OUTPATIENT
Start: 2021-01-28 | End: 2021-01-28

## 2021-01-28 RX ORDER — PROPOFOL 10 MG/ML
INJECTION, EMULSION INTRAVENOUS CONTINUOUS PRN
Status: DISCONTINUED | OUTPATIENT
Start: 2021-01-28 | End: 2021-01-28

## 2021-01-28 RX ORDER — ONDANSETRON 2 MG/ML
4 INJECTION INTRAMUSCULAR; INTRAVENOUS ONCE AS NEEDED
Status: CANCELLED | OUTPATIENT
Start: 2021-01-28

## 2021-01-28 RX ORDER — SODIUM CHLORIDE, SODIUM LACTATE, POTASSIUM CHLORIDE, CALCIUM CHLORIDE 600; 310; 30; 20 MG/100ML; MG/100ML; MG/100ML; MG/100ML
125 INJECTION, SOLUTION INTRAVENOUS CONTINUOUS
Status: DISCONTINUED | OUTPATIENT
Start: 2021-01-28 | End: 2021-02-01 | Stop reason: HOSPADM

## 2021-01-28 RX ADMIN — PROPOFOL 80 MG: 10 INJECTION, EMULSION INTRAVENOUS at 08:39

## 2021-01-28 RX ADMIN — PROPOFOL 100 MCG/KG/MIN: 10 INJECTION, EMULSION INTRAVENOUS at 08:39

## 2021-01-28 RX ADMIN — LIDOCAINE HYDROCHLORIDE 50 MG: 10 INJECTION, SOLUTION EPIDURAL; INFILTRATION; INTRACAUDAL; PERINEURAL at 08:39

## 2021-01-28 RX ADMIN — SODIUM CHLORIDE, SODIUM LACTATE, POTASSIUM CHLORIDE, AND CALCIUM CHLORIDE 125 ML/HR: .6; .31; .03; .02 INJECTION, SOLUTION INTRAVENOUS at 08:20

## 2021-01-28 NOTE — ANESTHESIA PREPROCEDURE EVALUATION
Procedure:  COLONOSCOPY    Relevant Problems   ANESTHESIA (within normal limits)      CARDIO (within normal limits)      ENDO   (+) Primary hypothyroidism      NEURO/PSYCH   (+) Anxiety      PULMONARY   (+) ALYCIA (obstructive sleep apnea)        Physical Exam    Airway    Mallampati score: II  TM Distance: <3 FB  Neck ROM: full     Dental   No notable dental hx     Cardiovascular  Rhythm: regular, Rate: normal,     Pulmonary  Breath sounds clear to auscultation,     Other Findings        Anesthesia Plan  ASA Score- 2     Anesthesia Type- IV sedation with anesthesia with ASA Monitors  Additional Monitors:   Airway Plan:           Plan Factors-Exercise tolerance (METS): >4 METS  Chart reviewed  Existing labs reviewed  Patient summary reviewed  Patient is not a current smoker  Induction- intravenous  Postoperative Plan-     Informed Consent- Anesthetic plan and risks discussed with patient  I personally reviewed this patient with the CRNA  Discussed and agreed on the Anesthesia Plan with the CRNA  Abi Alas

## 2021-01-28 NOTE — H&P
History and Physical - [facility]  Octavia Cline 56 y o  female MRN: [id number]                  HPI: Octavia Cline is a 56y o  year old female who presents for history of polyps, last colonoscopy 6 years ago      REVIEW OF SYSTEMS: Per the HPI, and otherwise unremarkable      Historical Information   Past Medical History:   Diagnosis Date    Arthritis     Complex cyst of right ovary     Disease of thyroid gland     Glaucoma     Hyperthyroidism     Uterus fibroma      Past Surgical History:   Procedure Laterality Date     SECTION      X2    COLONOSCOPY      HYSTERECTOMY      LAPAROTOMY N/A 2016    Procedure: LAPAROTOMY EXPLORATORY; OVARIAN CANCER STAGING ;  Surgeon: Radha Mitchell MD;  Location: [facility];  Service:    Vandana Allen      IA HEMORRHOIDECTOMY,INT/EXT, 2+ COLUMNS/GROUPS N/A 2019    Procedure: HEMORRHOIDECTOMY EXCISION;  Surgeon: Cristhian Stafford MD;  Location: [facility];  Service: Colorectal    IA LAPAROSCOPY W TOT HYSTERECTUTERUS <=250 GRAM  W TUBE/OVARY N/A 2016    Procedure: HYSTERECTOMY LAPAROSCOPIC TOTAL (LTH),cystoscopy, prostoctopy;  Surgeon: Radha Mitchell MD;  Location: [facility];  Service: Gynecology Oncology    TUBAL LIGATION       Social History   Social History     Substance and Sexual Activity   Alcohol Use Yes    Alcohol/week: 2 0 standard drinks    Types: 2 Standard drinks or equivalent per week    Frequency: Monthly or less    Comment: social, Acute alcohol use     Social History     Substance and Sexual Activity   Drug Use No     Social History     Tobacco Use   Smoking Status Never Smoker   Smokeless Tobacco Never Used     Family History   Problem Relation Age of Onset    Heart disease Mother     Diabetes Father     No Known Problems Sister     Seizures Brother     Heart disease Brother     No Known Problems Daughter     No Known Problems Maternal Grandmother     No Known Problems Maternal Grandfather     No Known Problems Paternal Grandmother     No Known Problems Paternal Grandfather     Cancer Brother         lump on neck    No Known Problems Daughter     No Known Problems Maternal Aunt     No Known Problems Maternal Uncle     No Known Problems Paternal Aunt     No Known Problems Paternal Uncle     Breast cancer additional onset Neg Hx     Mental illness Neg Hx        Meds/Allergies       Current Outpatient Medications:     b complex vitamins tablet    calcium carbonate (OS-MARVA) 600 MG tablet    EPINEPHrine (EPIPEN) 0 3 mg/0 3 mL SOAJ    latanoprost (XALATAN) 0 005 % ophthalmic solution    levothyroxine 50 mcg tablet    loratadine (CLARITIN) 10 mg tablet    Magnesium 400 MG TABS    Multiple Vitamin (MULTIVITAMIN) tablet    Turmeric 500 MG CAPS    venlafaxine (EFFEXOR-XR) 37 5 mg 24 hr capsule    Vitamin D, Cholecalciferol, 1000 UNITS TABS    Current Facility-Administered Medications:     lactated ringers infusion, 125 mL/hr, Intravenous, Continuous, 125 mL/hr at 01/28/21 0820    Allergies   Allergen Reactions    Shellfish-Derived Products Anaphylaxis and Itching       Objective     /68   Pulse 76   Temp 97 7 °F (36 5 °C) (Temporal)   Resp 16   SpO2 97%       PHYSICAL EXAM    Gen: NAD  CV: RRR  CHEST: Clear  ABD: soft, NT/ND  EXT: no edema      ASSESSMENT/PLAN:  This is a 56y o  year old female here for colonoscopy, and she is stable and optimized for her procedure

## 2021-01-28 NOTE — PERIOPERATIVE NURSING NOTE
7073 Received patient from procedure  Abdomen distended  Repositioned to left side, large amount of flatus expelled  Patient states some relief from flatus

## 2021-01-28 NOTE — ANESTHESIA POSTPROCEDURE EVALUATION
Post-Op Assessment Note    CV Status:  Stable  Pain Score: 0    Pain management: adequate     Mental Status:  Awake   Hydration Status:  Stable   PONV Controlled:  None   Airway Patency:  Patent      Post Op Vitals Reviewed: Yes      Staff: CRNA         No complications documented      /66 (01/28/21 0923)    Temp      Pulse 69 (01/28/21 0923)   Resp 18 (01/28/21 0923)    SpO2 99 % (01/28/21 0923)

## 2021-03-25 ENCOUNTER — OFFICE VISIT (OUTPATIENT)
Dept: PODIATRY | Facility: CLINIC | Age: 61
End: 2021-03-25
Payer: COMMERCIAL

## 2021-03-25 VITALS
WEIGHT: 170 LBS | DIASTOLIC BLOOD PRESSURE: 62 MMHG | RESPIRATION RATE: 17 BRPM | HEIGHT: 66 IN | HEART RATE: 64 BPM | BODY MASS INDEX: 27.32 KG/M2 | SYSTOLIC BLOOD PRESSURE: 116 MMHG

## 2021-03-25 DIAGNOSIS — M76.72 PERONEAL TENDINITIS OF LEFT LOWER EXTREMITY: Primary | ICD-10-CM

## 2021-03-25 DIAGNOSIS — M21.962 ACQUIRED DEFORMITY OF LEFT FOOT: ICD-10-CM

## 2021-03-25 DIAGNOSIS — M89.8X9 BONY EXOSTOSIS: ICD-10-CM

## 2021-03-25 PROCEDURE — 73630 X-RAY EXAM OF FOOT: CPT | Performed by: PODIATRIST

## 2021-03-25 PROCEDURE — 99202 OFFICE O/P NEW SF 15 MIN: CPT | Performed by: PODIATRIST

## 2021-03-25 PROCEDURE — 20551 NJX 1 TENDON ORIGIN/INSJ: CPT | Performed by: PODIATRIST

## 2021-03-25 RX ORDER — MELOXICAM 7.5 MG/1
7.5 TABLET ORAL DAILY
Qty: 10 TABLET | Refills: 0 | Status: SHIPPED | OUTPATIENT
Start: 2021-03-25 | End: 2021-06-17 | Stop reason: ALTCHOICE

## 2021-03-25 NOTE — PROGRESS NOTES
Assessment/Plan: peroneal tendinitis left foot  Enlarged bony exostosis left foot  Pain upon ambulation  Plan  Foot exam performed  Patient educated on condition  X-rays taken  Trigger point injection done  1 cc Kenalog 10 injected into peroneus brevis tendon insertion area  This was done without pain or complication  Patient will use orthotics  She has been placed on Mobic         Diagnoses and all orders for this visit:    Peroneal tendinitis of left lower extremity  -     meloxicam (MOBIC) 7 5 mg tablet; Take 1 tablet (7 5 mg total) by mouth daily for 10 days    Acquired deformity of left foot    Bony exostosis          Subjective:  Patient has pain on the outside of the left foot  No history of trauma  This has been ongoing for weeks    Allergies   Allergen Reactions    Shellfish-Derived Products Anaphylaxis and Itching         Current Outpatient Medications:     b complex vitamins tablet, Take 1 tablet by mouth daily  , Disp: , Rfl:     calcium carbonate (OS-MARVA) 600 MG tablet, Take 600 mg by mouth daily  , Disp: , Rfl:     EPINEPHrine (EPIPEN) 0 3 mg/0 3 mL SOAJ, Inject 0 3 mL (0 3 mg total) into a muscle once for 1 dose (Patient taking differently: Inject 0 3 mg into a muscle as needed ), Disp: 0 6 mL, Rfl: 1    latanoprost (XALATAN) 0 005 % ophthalmic solution, Administer 1 drop to both eyes daily at bedtime  , Disp: , Rfl:     levothyroxine 50 mcg tablet, TAKE 1 TABLET BY MOUTH  DAILY AS DIRECTED, Disp: 90 tablet, Rfl: 3    loratadine (CLARITIN) 10 mg tablet, Take 10 mg by mouth daily  , Disp: , Rfl:     Magnesium 400 MG TABS, , Disp: , Rfl:     meloxicam (MOBIC) 7 5 mg tablet, Take 1 tablet (7 5 mg total) by mouth daily for 10 days, Disp: 10 tablet, Rfl: 0    Multiple Vitamin (MULTIVITAMIN) tablet, Take 1 tablet by mouth daily  , Disp: , Rfl:     Turmeric 500 MG CAPS, , Disp: , Rfl:     venlafaxine (EFFEXOR-XR) 37 5 mg 24 hr capsule, TAKE 1 CAPSULE BY MOUTH  DAILY, Disp: 90 capsule, Rfl: 1    Vitamin D, Cholecalciferol, 1000 UNITS TABS, Take 1,000 Units by mouth daily  , Disp: , Rfl:     Patient Active Problem List   Diagnosis    Adnexal mass    Anxiety    Primary hypothyroidism    ALYCIA (obstructive sleep apnea)    Acquired deformity of foot    Valgus deformity of great toe    Congenital pes planus of right foot    Congenital pes planus of left foot    Encounter for annual routine gynecological examination    Encounter for screening mammogram for breast cancer    Goiter    Allergic rhinitis    Elevated ALT measurement          Patient ID: Stephanie Zaidi is a 56 y o  female  HPI    The following portions of the patient's history were reviewed and updated as appropriate:     family history includes Cancer in her brother; Diabetes in her father; Heart disease in her brother and mother; No Known Problems in her daughter, daughter, maternal aunt, maternal grandfather, maternal grandmother, maternal uncle, paternal aunt, paternal grandfather, paternal grandmother, paternal uncle, and sister; Seizures in her brother  reports that she has never smoked  She has never used smokeless tobacco  She reports current alcohol use of about 2 0 standard drinks of alcohol per week  She reports that she does not use drugs  Vitals:    03/25/21 0934   BP: 116/62   Pulse: 64   Resp: 17       Review of Systems      Objective:  Patient's shoes and socks removed     Foot ExamPhysical Exam      Foot Exam     General  General Appearance: appears stated age and healthy   Orientation: alert and oriented to person, place, and time   Affect: appropriate   Gait: unimpaired         Right Foot/Ankle      Inspection and Palpation  Ecchymosis: none  Tenderness: great toe metatarsophalangeal joint   Swelling: none   Arch: pes planus  Hallux valgus: yes  Hallux limitus: yes     Neurovascular  Dorsalis pedis: 3+  Posterior tibial: 3+  Saphenous nerve sensation: normal  Tibial nerve sensation: normal  Superficial peroneal nerve sensation: normal  Deep peroneal nerve sensation: normal  Sural nerve sensation: normal     Muscle Strength  Ankle dorsiflexion: 5  Ankle plantar flexion: 5  Ankle inversion: 5  Ankle eversion: 5  Great toe extension: 5  Great toe flexion: 5     Range of Motion     Normal right ankle ROM     Tests  PT Tinel's sign: negative    Too many toes: positive         Left Foot/Ankle       Inspection and Palpation  Ecchymosis: none  Tenderness: great toe metatarsophalangeal joint   Pain with palpation peroneus brevis tendon insertion left  Swelling: great toe metatarsophalangeal joint   Arch: pes planus  Hallux valgus: yes  Hallux limitus: yes     Neurovascular  Dorsalis pedis: 3+  Posterior tibial: 3+  Saphenous nerve sensation: normal  Tibial nerve sensation: normal  Superficial peroneal nerve sensation: normal  Deep peroneal nerve sensation: normal  Sural nerve sensation: normal     Muscle Strength  Ankle dorsiflexion: 5  Ankle plantar flexion: 5  Ankle inversion: 5  Ankle eversion: 5  Great toe extension: 5  Great toe flexion: 5     Range of Motion     Normal left ankle ROM     Tests  PT Tinel's sign: negative  Too many toes: positive      Comments  X-ray  X-ray demonstrates early osteoarthritis of the 1st MPJ bilateral   Metatarsus adductus noted as well  Early bunion formation noted  Enlarged base 5th metatarsal left foot noted  Physical Exam   Cardiovascular:   Pulses:       Dorsalis pedis pulses are 3+ on the right side, and 3+ on the left side          Posterior tibial pulses are 3+ on the right side, and 3+ on the left side

## 2021-04-10 DIAGNOSIS — F41.9 ANXIETY: ICD-10-CM

## 2021-04-12 RX ORDER — VENLAFAXINE HYDROCHLORIDE 37.5 MG/1
CAPSULE, EXTENDED RELEASE ORAL
Qty: 90 CAPSULE | Refills: 3 | Status: SHIPPED | OUTPATIENT
Start: 2021-04-12 | End: 2022-04-01

## 2021-04-13 DIAGNOSIS — Z23 ENCOUNTER FOR IMMUNIZATION: ICD-10-CM

## 2021-05-07 DIAGNOSIS — Z91.013 SHELLFISH ALLERGY: ICD-10-CM

## 2021-05-07 RX ORDER — EPINEPHRINE 0.3 MG/.3ML
0.3 INJECTION SUBCUTANEOUS ONCE
Qty: 0.6 ML | Refills: 1 | Status: SHIPPED | OUTPATIENT
Start: 2021-05-07 | End: 2022-07-14

## 2021-06-17 ENCOUNTER — OFFICE VISIT (OUTPATIENT)
Dept: FAMILY MEDICINE CLINIC | Facility: CLINIC | Age: 61
End: 2021-06-17
Payer: COMMERCIAL

## 2021-06-17 VITALS
HEIGHT: 65 IN | RESPIRATION RATE: 18 BRPM | OXYGEN SATURATION: 97 % | DIASTOLIC BLOOD PRESSURE: 78 MMHG | HEART RATE: 85 BPM | WEIGHT: 165.6 LBS | TEMPERATURE: 100.2 F | SYSTOLIC BLOOD PRESSURE: 118 MMHG | BODY MASS INDEX: 27.59 KG/M2

## 2021-06-17 DIAGNOSIS — Z23 NEED FOR VACCINATION: ICD-10-CM

## 2021-06-17 DIAGNOSIS — Z00.00 ANNUAL PHYSICAL EXAM: Primary | ICD-10-CM

## 2021-06-17 DIAGNOSIS — E03.9 PRIMARY HYPOTHYROIDISM: ICD-10-CM

## 2021-06-17 DIAGNOSIS — Z13.6 SCREENING FOR CARDIOVASCULAR CONDITION: ICD-10-CM

## 2021-06-17 DIAGNOSIS — Z13.0 SCREENING FOR DEFICIENCY ANEMIA: ICD-10-CM

## 2021-06-17 PROCEDURE — 90750 HZV VACC RECOMBINANT IM: CPT

## 2021-06-17 PROCEDURE — 3008F BODY MASS INDEX DOCD: CPT | Performed by: FAMILY MEDICINE

## 2021-06-17 PROCEDURE — 1036F TOBACCO NON-USER: CPT | Performed by: FAMILY MEDICINE

## 2021-06-17 PROCEDURE — 3725F SCREEN DEPRESSION PERFORMED: CPT | Performed by: FAMILY MEDICINE

## 2021-06-17 PROCEDURE — 90471 IMMUNIZATION ADMIN: CPT

## 2021-06-17 PROCEDURE — 99396 PREV VISIT EST AGE 40-64: CPT | Performed by: FAMILY MEDICINE

## 2021-06-17 NOTE — PROGRESS NOTES
FAMILY PRACTICE HEALTH MAINTENANCE OFFICE VISIT  [facility] - [facility]    NAME: Lissette Barriga  AGE: 59 y o  SEX: female  : 1960     DATE: 2021    Assessment and Plan     1  Annual physical exam    2  Primary hypothyroidism  -     TSH, 3rd generation; Future  -     T4, free; Future    3  Need for vaccination  -     Zoster Vaccine Recombinant IM    4  Screening for cardiovascular condition  -     Comprehensive metabolic panel; Future  -     Lipid Panel with Direct LDL reflex; Future    5  Screening for deficiency anemia  -     CBC; Future        · Patient Counseling:   · Nutrition: Stressed importance of a well balanced diet, moderation of sodium/saturated fat, caloric balance and sufficient intake of fiber  · Exercise: Stressed the importance of regular exercise with a goal of 150 minutes per week  · Dental Health: Discussed daily flossing and brushing and regular dental visits     · Immunizations reviewed: See Orders  · Discussed benefits of:  Mammogram , Cervical Cancer screening and Screening labs   BMI Counseling: Body mass index is 27 35 kg/m²  Discussed with patient's BMI with her  The BMI is above normal  Exercise recommendations include exercising 3-5 times per week  Return in about 1 year (around 2022) for Annual physical and needs 2nd shingrix in 2 months   Chief Complaint     Chief Complaint   Patient presents with    Physical Exam     [email or web address]       History of Present Illness     She is feeling well    She is getting ready for her daughter's wedding in the fall      Well Adult Physical   Patient here for a comprehensive physical exam       Diet and Physical Activity  Diet: well balanced diet  Exercise: frequently      Depression Screen  PHQ-9 Depression Screening    PHQ-9:   Frequency of the following problems over the past two weeks:      Little interest or pleasure in doing things: 0 - not at all  Feeling down, depressed, or hopeless: 0 - not at all  PHQ-2 Score: 0          General Health  Hearing: Normal:  bilateral  Vision: no vision problems  Dental: regular dental visits    Reproductive Health  Post-menopausal       The following portions of the patient's history were reviewed and updated as appropriate: allergies, current medications, past family history, past medical history, past social history, past surgical history and problem list     Review of Systems     Review of Systems   Constitutional: Negative  Respiratory: Negative  Cardiovascular: Negative          Past Medical History     Past Medical History:   Diagnosis Date    Arthritis     Complex cyst of right ovary     Disease of thyroid gland     Glaucoma     Hyperthyroidism     Uterus fibroma        Past Surgical History     Past Surgical History:   Procedure Laterality Date     SECTION      X2    COLONOSCOPY      HYSTERECTOMY      LAPAROTOMY N/A 2016    Procedure: LAPAROTOMY EXPLORATORY; OVARIAN CANCER STAGING ;  Surgeon: Asad Manzanares MD;  [facility];  Service:    Ceasar Rubinstein      MT HEMORRHOIDECTOMY,INT/EXT, 2+ COLUMNS/GROUPS N/A 2019    Procedure: HEMORRHOIDECTOMY EXCISION;  Surgeon: Tyronne Bloch, MD;  Location: [facility];  Service: Colorectal    MT LAPAROSCOPY W TOT HYSTERECTUTERUS <=250 GRAM  W TUBE/OVARY N/A 2016    Procedure: HYSTERECTOMY LAPAROSCOPIC TOTAL (LTH),cystoscopy, prostoctopy;  Surgeon: Asad Manzanares MD;  [facility];  Service: Gynecology Oncology    TUBAL LIGATION         Social History     Social History     Socioeconomic History    Marital status: /Civil Union     Spouse name: None    Number of children: 2    Years of education: None    Highest education level: None   Occupational History     Comment: Full-time employment   Tobacco Use    Smoking status: Never Smoker    Smokeless tobacco: Never Used   Vaping Use    Vaping Use: Never used   Substance and Sexual Activity    Alcohol use: Yes     Alcohol/week: 2 0 standard drinks     Types: 2 Standard drinks or equivalent per week     Comment: social, Acute alcohol use    Drug use: No    Sexual activity: Yes     Partners: Male     Birth control/protection: Female Sterilization   Other Topics Concern    None   Social History Narrative    Daily caffeine consumption, 2-3 servings a day    Exercises 1-2 times per week     Social Determinants of Health     Financial Resource Strain:     Difficulty of Paying Living Expenses:    Food Insecurity:     Worried About Running Out of Food in the Last Year:     [address] in the Last Year:    Transportation Needs:     Lack of Transportation (Medical):      Lack of Transportation (Non-Medical):    Physical Activity:     Days of Exercise per Week:     Minutes of Exercise per Session:    Stress:     Feeling of Stress :    Social Connections:     Frequency of Communication with Friends and Family:     Frequency of Social Gatherings with Friends and Family:     Attends [employer] Services:     Active Member of Clubs or Organizations:     Attends Club or Organization Meetings:     Marital Status:    Intimate Partner Violence:     Fear of Current or Ex-Partner:     Emotionally Abused:     Physically Abused:     Sexually Abused:        Family History     Family History   Problem Relation Age of Onset    Heart disease Mother     Diabetes Father     No Known Problems Sister     Seizures Brother     Heart disease Brother     No Known Problems Daughter     No Known Problems Maternal Grandmother     No Known Problems Maternal Grandfather     No Known Problems Paternal Grandmother     No Known Problems Paternal Grandfather     Cancer Brother         lump on neck    No Known Problems Daughter     No Known Problems Maternal Aunt     No Known Problems Maternal Uncle     No Known Problems Paternal Aunt     No Known Problems Paternal Uncle     Breast cancer additional onset Neg Hx     Mental illness Neg Hx        Current Medications       Current Outpatient Medications:     b complex vitamins tablet, Take 1 tablet by mouth daily  , Disp: , Rfl:     calcium carbonate (OS-MARVA) 600 MG tablet, Take 600 mg by mouth daily  , Disp: , Rfl:     latanoprost (XALATAN) 0 005 % ophthalmic solution, Administer 1 drop to both eyes daily at bedtime  , Disp: , Rfl:     levothyroxine 50 mcg tablet, TAKE 1 TABLET BY MOUTH  DAILY AS DIRECTED, Disp: 90 tablet, Rfl: 3    loratadine (CLARITIN) 10 mg tablet, Take 10 mg by mouth daily  , Disp: , Rfl:     Magnesium 400 MG TABS, , Disp: , Rfl:     Multiple Vitamin (MULTIVITAMIN) tablet, Take 1 tablet by mouth daily  , Disp: , Rfl:     Turmeric 500 MG CAPS, , Disp: , Rfl:     venlafaxine (EFFEXOR-XR) 37 5 mg 24 hr capsule, TAKE 1 CAPSULE BY MOUTH  DAILY, Disp: 90 capsule, Rfl: 3    Vitamin D, Cholecalciferol, 1000 UNITS TABS, Take 1,000 Units by mouth daily  , Disp: , Rfl:     EPINEPHrine (EPIPEN) 0 3 mg/0 3 mL SOAJ, Inject 0 3 mL (0 3 mg total) into a muscle once for 1 dose, Disp: 0 6 mL, Rfl: 1     Allergies     Allergies   Allergen Reactions    Shellfish-Derived Products - Food Allergy Anaphylaxis and Itching       Objective     /78   Pulse 85   Temp 100 2 °F (37 9 °C)   Resp 18   Ht 5' 5 25" (1 657 m)   Wt 75 1 kg (165 lb 9 6 oz)   SpO2 97%   BMI 27 35 kg/m²      Physical Exam  Vitals and nursing note reviewed  Constitutional:       Appearance: She is well-developed  HENT:      Head: Normocephalic and atraumatic  Right Ear: Tympanic membrane and external ear normal       Left Ear: Tympanic membrane and external ear normal    Eyes:      Pupils: Pupils are equal, round, and reactive to light  Cardiovascular:      Rate and Rhythm: Normal rate and regular rhythm  Heart sounds: Normal heart sounds  No murmur heard  No friction rub  Pulmonary:      Effort: Pulmonary effort is normal  No respiratory distress        Breath sounds: Normal breath sounds  No wheezing or rales  Abdominal:      General: Bowel sounds are normal  There is no distension  Palpations: Abdomen is soft  There is no mass  Tenderness: There is no abdominal tenderness  There is no guarding or rebound  Musculoskeletal:      Cervical back: Normal range of motion  Right lower leg: No edema  Left lower leg: No edema  Skin:     General: Skin is warm  Neurological:      Mental Status: She is alert and oriented to person, place, and time              Visual Acuity Screening    Right eye Left eye Both eyes   Without correction:      With correction: 20/30 20/40 20/25           [address]

## 2021-06-23 LAB
ALBUMIN SERPL-MCNC: 4.5 G/DL (ref 3.8–4.8)
ALBUMIN/GLOB SERPL: 2 {RATIO} (ref 1.2–2.2)
ALP SERPL-CCNC: 92 IU/L (ref 48–121)
ALT SERPL-CCNC: 27 IU/L (ref 0–32)
AST SERPL-CCNC: 24 IU/L (ref 0–40)
BASOPHILS # BLD AUTO: 0.1 X10E3/UL (ref 0–0.2)
BASOPHILS NFR BLD AUTO: 1 %
BILIRUB SERPL-MCNC: 0.4 MG/DL (ref 0–1.2)
BUN SERPL-MCNC: 11 MG/DL (ref 8–27)
BUN/CREAT SERPL: 17 (ref 12–28)
CALCIUM SERPL-MCNC: 9.7 MG/DL (ref 8.7–10.3)
CHLORIDE SERPL-SCNC: 100 MMOL/L (ref 96–106)
CHOLEST SERPL-MCNC: 215 MG/DL (ref 100–199)
CO2 SERPL-SCNC: 24 MMOL/L (ref 20–29)
CREAT SERPL-MCNC: 0.64 MG/DL (ref 0.57–1)
EOSINOPHIL # BLD AUTO: 0.2 X10E3/UL (ref 0–0.4)
EOSINOPHIL NFR BLD AUTO: 4 %
ERYTHROCYTE [DISTWIDTH] IN BLOOD BY AUTOMATED COUNT: 11.9 % (ref 11.7–15.4)
GLOBULIN SER-MCNC: 2.3 G/DL (ref 1.5–4.5)
GLUCOSE SERPL-MCNC: 94 MG/DL (ref 65–99)
HCT VFR BLD AUTO: 39 % (ref 34–46.6)
HDLC SERPL-MCNC: 83 MG/DL
HGB BLD-MCNC: 13.2 G/DL (ref 11.1–15.9)
IMM GRANULOCYTES # BLD: 0 X10E3/UL (ref 0–0.1)
IMM GRANULOCYTES NFR BLD: 0 %
LDLC SERPL CALC-MCNC: 122 MG/DL (ref 0–99)
LYMPHOCYTES # BLD AUTO: 1.1 X10E3/UL (ref 0.7–3.1)
LYMPHOCYTES NFR BLD AUTO: 31 %
MCH RBC QN AUTO: 31.8 PG (ref 26.6–33)
MCHC RBC AUTO-ENTMCNC: 33.8 G/DL (ref 31.5–35.7)
MCV RBC AUTO: 94 FL (ref 79–97)
MICRODELETION SYND BLD/T FISH: NORMAL
MONOCYTES # BLD AUTO: 0.4 X10E3/UL (ref 0.1–0.9)
MONOCYTES NFR BLD AUTO: 10 %
NEUTROPHILS # BLD AUTO: 1.9 X10E3/UL (ref 1.4–7)
NEUTROPHILS NFR BLD AUTO: 54 %
PLATELET # BLD AUTO: 291 X10E3/UL (ref 150–450)
POTASSIUM SERPL-SCNC: 4.6 MMOL/L (ref 3.5–5.2)
PROT SERPL-MCNC: 6.8 G/DL (ref 6–8.5)
RBC # BLD AUTO: 4.15 X10E6/UL (ref 3.77–5.28)
SL AMB EGFR AFRICAN AMERICAN: 110 ML/MIN/1.73
SL AMB EGFR NON AFRICAN AMERICAN: 95 ML/MIN/1.73
SODIUM SERPL-SCNC: 138 MMOL/L (ref 134–144)
T4 FREE SERPL-MCNC: 1.28 NG/DL (ref 0.82–1.77)
TRIGL SERPL-MCNC: 58 MG/DL (ref 0–149)
TSH SERPL DL<=0.005 MIU/L-ACNC: 2.96 UIU/ML (ref 0.45–4.5)
WBC # BLD AUTO: 3.7 X10E3/UL (ref 3.4–10.8)

## 2021-07-29 NOTE — OP NOTE
OPERATIVE REPORT  PATIENT NAME: Trena Sharpe    :  1960  MRN: [id number]  Pt Location: [facility] ROOM 10    SURGERY DATE: 2019    Surgeon(s) and Role:     * Jesus Anne MD - Primary     * Khari Maria MD - Assisting    Preop Diagnosis:  Hemorrhoids, unspecified hemorrhoid type [K64 9]    Post-Op Diagnosis Codes:     * Hemorrhoids, unspecified hemorrhoid type [K64 9]    Procedure(s) (LRB):  HEMORRHOIDECTOMY EXCISION (N/A), internal and external, extensive, 3 collumns  Specimen(s):  ID Type Source Tests Collected by Time Destination   1 :  Tissue Hemorrhoids TISSUE EXAM Jesus Anne MD 2019 1425        Estimated Blood Loss:   Minimal    Drains:  * No LDAs found *    Anesthesia Type:   General    Operative Indications:  Hemorrhoids, internal and external     Operative Findings:  Hemorrhoids, internal and external     Complications:   None    Procedure and Technique:  Patient was placed in a prone sinan-knife position with the buttocks taped apart  The anal area was prepped widely using Betadine and draped in a sterile manner  A time-out was done  The hemorrhoids were extremely redundant and large  Exparel with a 1-2 dilution of bupivacaine was infused into the hemorrhoidal tissues  Hemorrhoidectomy was undertaken at the left lateral, right anterior, and right posterior positions in a similar manner as follows: The external hemorrhoid was grasped and withdrawn caudally  Excision was undertaken from approximately 1 5 cm caudal to the anal verge, extending proximally to the proximal extent of the internal anal sphincter  The internal sphincters were preserved  Suture closure was accomplished using running 2 0 chromic sutures  Wounds were inspected for hemostasis with anoscope be and were seen to be dry at the conclusion of the operation  Sponge needle instrument counts were correct      I was present for the entire procedure    Patient Disposition:  PACU     SIGNATURE: Alexsandra Lovett Mani Bassett MD  DATE: November 5, 2019  TIME: 3:19 PM PAST SURGICAL HISTORY:  H/O aortic valve replacement     S/P CABG x 1      PAST SURGICAL HISTORY:  H/O mitral valve replacement with mechanical valve     S/P CABG x 1

## 2021-08-17 ENCOUNTER — TELEPHONE (OUTPATIENT)
Dept: FAMILY MEDICINE CLINIC | Facility: CLINIC | Age: 61
End: 2021-08-17

## 2021-08-18 ENCOUNTER — CLINICAL SUPPORT (OUTPATIENT)
Dept: FAMILY MEDICINE CLINIC | Facility: CLINIC | Age: 61
End: 2021-08-18
Payer: COMMERCIAL

## 2021-08-18 DIAGNOSIS — Z23 NEED FOR VACCINATION: Primary | ICD-10-CM

## 2021-08-18 PROCEDURE — 90471 IMMUNIZATION ADMIN: CPT

## 2021-08-18 PROCEDURE — 90750 HZV VACC RECOMBINANT IM: CPT

## 2021-09-22 ENCOUNTER — TELEPHONE (OUTPATIENT)
Dept: PULMONOLOGY | Facility: CLINIC | Age: 61
End: 2021-09-22

## 2021-09-22 NOTE — TELEPHONE ENCOUNTER
Salina Linn,    Please check with her insurance and equipment company if she can get a new machine  Thank you      Rasheed Sender, NIK

## 2021-09-22 NOTE — TELEPHONE ENCOUNTER
PT called re: her recalled CPAP machine  She was changing her filters every week and they weren't too dirty but recently everyday the filters are coming out black  She is very concerned because she is still using it  She wants to know if she should still be using it and if not can she get a replacement machine in the interim because she has severe ALYCIA and has to be on her CPAP  Her current CPAP is 14 years old   Please advise [phone]

## 2021-10-20 ENCOUNTER — TELEPHONE (OUTPATIENT)
Dept: FAMILY MEDICINE CLINIC | Facility: CLINIC | Age: 61
End: 2021-10-20

## 2021-12-14 ENCOUNTER — IMMUNIZATIONS (OUTPATIENT)
Dept: FAMILY MEDICINE CLINIC | Facility: HOSPITAL | Age: 61
End: 2021-12-14

## 2021-12-14 DIAGNOSIS — Z23 ENCOUNTER FOR IMMUNIZATION: Primary | ICD-10-CM

## 2021-12-14 PROCEDURE — 91306 COVID-19 MODERNA VACC 0.25 ML BOOSTER: CPT

## 2021-12-14 PROCEDURE — 0064A COVID-19 MODERNA VACC 0.25 ML BOOSTER: CPT

## 2021-12-30 DIAGNOSIS — E03.9 PRIMARY HYPOTHYROIDISM: ICD-10-CM

## 2021-12-30 RX ORDER — LEVOTHYROXINE SODIUM 0.05 MG/1
TABLET ORAL
Qty: 90 TABLET | Refills: 3 | Status: SHIPPED | OUTPATIENT
Start: 2021-12-30 | End: 2022-07-14 | Stop reason: SDUPTHER

## 2022-01-16 PROBLEM — Z90.79 STATUS POST TOTAL ABDOMINAL HYSTERECTOMY AND BILATERAL SALPINGO-OOPHORECTOMY (TAH-BSO): Status: ACTIVE | Noted: 2022-01-16

## 2022-01-16 PROBLEM — R92.30 DENSE BREAST TISSUE ON MAMMOGRAM: Status: ACTIVE | Noted: 2022-01-16

## 2022-01-16 PROBLEM — R92.2 DENSE BREAST TISSUE ON MAMMOGRAM: Status: ACTIVE | Noted: 2022-01-16

## 2022-01-16 PROBLEM — N95.2 ATROPHIC VAGINITIS: Status: ACTIVE | Noted: 2022-01-16

## 2022-01-16 PROBLEM — Z90.79 HISTORY OF TOTAL HYSTERECTOMY WITH BILATERAL SALPINGO-OOPHORECTOMY (BSO): Status: ACTIVE | Noted: 2022-01-16

## 2022-01-16 PROBLEM — Z90.710 STATUS POST TOTAL ABDOMINAL HYSTERECTOMY AND BILATERAL SALPINGO-OOPHORECTOMY (TAH-BSO): Status: ACTIVE | Noted: 2022-01-16

## 2022-01-16 PROBLEM — Z90.710 HISTORY OF TOTAL HYSTERECTOMY WITH BILATERAL SALPINGO-OOPHORECTOMY (BSO): Status: ACTIVE | Noted: 2022-01-16

## 2022-01-16 PROBLEM — Z90.722 HISTORY OF TOTAL HYSTERECTOMY WITH BILATERAL SALPINGO-OOPHORECTOMY (BSO): Status: ACTIVE | Noted: 2022-01-16

## 2022-01-16 PROBLEM — Z90.722 STATUS POST TOTAL ABDOMINAL HYSTERECTOMY AND BILATERAL SALPINGO-OOPHORECTOMY (TAH-BSO): Status: ACTIVE | Noted: 2022-01-16

## 2022-01-17 ENCOUNTER — ANNUAL EXAM (OUTPATIENT)
Dept: OBGYN CLINIC | Facility: CLINIC | Age: 62
End: 2022-01-17
Payer: COMMERCIAL

## 2022-01-17 VITALS
DIASTOLIC BLOOD PRESSURE: 80 MMHG | HEIGHT: 66 IN | WEIGHT: 164.2 LBS | SYSTOLIC BLOOD PRESSURE: 122 MMHG | BODY MASS INDEX: 26.39 KG/M2

## 2022-01-17 DIAGNOSIS — Z12.31 ENCOUNTER FOR SCREENING MAMMOGRAM FOR BREAST CANCER: ICD-10-CM

## 2022-01-17 DIAGNOSIS — Z90.710 HISTORY OF TOTAL HYSTERECTOMY WITH BILATERAL SALPINGO-OOPHORECTOMY (BSO): ICD-10-CM

## 2022-01-17 DIAGNOSIS — Z01.419 ENCOUNTER FOR ANNUAL ROUTINE GYNECOLOGICAL EXAMINATION: Primary | ICD-10-CM

## 2022-01-17 DIAGNOSIS — Z90.79 HISTORY OF TOTAL HYSTERECTOMY WITH BILATERAL SALPINGO-OOPHORECTOMY (BSO): ICD-10-CM

## 2022-01-17 DIAGNOSIS — N95.2 ATROPHIC VAGINITIS: ICD-10-CM

## 2022-01-17 DIAGNOSIS — Z90.722 HISTORY OF TOTAL HYSTERECTOMY WITH BILATERAL SALPINGO-OOPHORECTOMY (BSO): ICD-10-CM

## 2022-01-17 DIAGNOSIS — R92.2 DENSE BREAST TISSUE ON MAMMOGRAM: ICD-10-CM

## 2022-01-17 PROCEDURE — 99396 PREV VISIT EST AGE 40-64: CPT | Performed by: OBSTETRICS & GYNECOLOGY

## 2022-01-17 PROCEDURE — 1036F TOBACCO NON-USER: CPT | Performed by: OBSTETRICS & GYNECOLOGY

## 2022-01-17 PROCEDURE — 3008F BODY MASS INDEX DOCD: CPT | Performed by: OBSTETRICS & GYNECOLOGY

## 2022-01-17 NOTE — PROGRESS NOTES
Assessment/Plan:  NGE    s/p [facility]-BSO 9/16  Dyspareunia/AV- continue with over-the-counter lubricant  Feels hot all the time, continued hot flashes and night sweats along with a 17 lb weight gain in the past year which may act is insulation  She is much more active  She had a sedentary job  Black cohosh 20 mg b i d  failed '21  Co- Testing - NA    RTO 1 yr     SBE monthly  3 D Mammography   Dense Breasts- ABUS  Colonoscopy- 1 polyp '15, repeated 1/21  Exercise 3/wk - adequate now, works FT, lost 6 #'s '18, gained 5 '19, 17 '20  Calcium 1,000 mg/d with Vit D - too much  To discontinue 1 supplement      Depression screen:  Negative       Diagnoses and all orders for this visit:    Encounter for annual routine gynecological examination    Encounter for screening mammogram for breast cancer  -     Mammo screening bilateral w 3d & cad; Future    Dense breast tissue on mammogram  -     US breast screening bilateral complete (ABUS); Future    Atrophic vaginitis    History of total hysterectomy with bilateral salpingo-oophorectomy (BSO)              Subjective:        Patient ID: Vivek Christian is a 59 y o  female  Aster Romero is here for a yearly evaluation  She has no complaints  She still notes night sweats and hot flashes  These are mild  Black cohosh did not seem to help at all so she discontinued after a month or 2  She has also been on Effexor 35 5 mg during this time for anxiety  She remains sexually active  The following portions of the patient's history were reviewed and updated as appropriate: She  has a past medical history of Arthritis, Complex cyst of right ovary, Disease of thyroid gland, Glaucoma, Hyperthyroidism, and Uterus fibroma    Patient Active Problem List    Diagnosis Date Noted    Dense breast tissue on mammogram 01/16/2022    Atrophic vaginitis 01/16/2022    Status post total abdominal hysterectomy and bilateral salpingo-oophorectomy (JORDY-BSO) 01/16/2022    History of total hysterectomy with bilateral salpingo-oophorectomy (BSO) 2022    Elevated ALT measurement 10/30/2020    Encounter for annual routine gynecological examination 2018    Encounter for screening mammogram for breast cancer 2018    Acquired deformity of foot 2018    Valgus deformity of great toe 2018    Congenital pes planus of right foot 2018    Congenital pes planus of left foot 2018    Goiter 2017    Adnexal mass 2016    Anxiety 2016    Primary hypothyroidism 2016    ALYCIA (obstructive sleep apnea) 2016    Allergic rhinitis 2014   PMH:   2 para 2; C/S X 2- 1st CPD/OP, 2nd Repeat- ,   Hyperthyroidism with goiter   Anxiety  Uterine fibroid  LTL   Menopause -   Hypothyroidism   Colonoscopy 4/15 one polyp  Heather  wnl  Dyspareunia, AV      Complex Pelvic Mass- TL H, BSO, AMINTA  Cystoscopy, proctoscopy- endometriosis, extensive adhesions, 6 2 right ovarian     serous cystadenofibroma, simple cystic endometrial hyperplasia with mild atypia  16         ALYCIA        Avulsion fracture right ankle, sprained twice- summer '19       Hemorrhoidectomy   She  has a past surgical history that includes  section; Tubal ligation; pr laparoscopy w tot hysterectuterus <=250 gram  w tube/ovary (N/A, 2016); LAPAROTOMY (N/A, 2016); Colonoscopy; Hysterectomy; pr hemorrhoidectomy,int/ext, 2+ columns/groups (N/A, 2019); and Oophorectomy  Her family history includes Cancer in her brother; Diabetes in her father; Heart disease in her brother and mother; No Known Problems in her daughter, daughter, maternal aunt, maternal grandfather, maternal grandmother, maternal uncle, paternal aunt, paternal grandfather, paternal grandmother, paternal uncle, and sister; Seizures in her brother     FH:  Brother 67 with tonsil or tongue cancer   F d 80  M- Uterine Ca 93, d 95 of natural causes, HTN  PGM- MI  PGF - DM  B- Pneumonia, Cardiac arrest d 71   She  reports that she has never smoked  She has never used smokeless tobacco  She reports current alcohol use of about 2 0 standard drinks of alcohol per week  She reports that she does not use drugs  SH:  '81  Chelsi- in [place] had her 31st anniversary at work  Her  already is retired  She retired 5/20  Current Outpatient Medications   Medication Sig Dispense Refill    b complex vitamins tablet Take 1 tablet by mouth daily   calcium carbonate (OS-MARVA) 600 MG tablet Take 600 mg by mouth daily   EPINEPHrine (EPIPEN) 0 3 mg/0 3 mL SOAJ Inject 0 3 mL (0 3 mg total) into a muscle once for 1 dose 0 6 mL 1    latanoprost (XALATAN) 0 005 % ophthalmic solution Administer 1 drop to both eyes daily at bedtime   levothyroxine 50 mcg tablet TAKE 1 TABLET BY MOUTH  DAILY AS DIRECTED 90 tablet 3    loratadine (CLARITIN) 10 mg tablet Take 10 mg by mouth daily   Magnesium 400 MG TABS       Multiple Vitamin (MULTIVITAMIN) tablet Take 1 tablet by mouth daily   Turmeric 500 MG CAPS       venlafaxine (EFFEXOR-XR) 37 5 mg 24 hr capsule TAKE 1 CAPSULE BY MOUTH  DAILY 90 capsule 3    Vitamin D, Cholecalciferol, 1000 UNITS TABS Take 1,000 Units by mouth daily  No current facility-administered medications for this visit  Current Outpatient Medications on File Prior to Visit   Medication Sig    b complex vitamins tablet Take 1 tablet by mouth daily   calcium carbonate (OS-MARVA) 600 MG tablet Take 600 mg by mouth daily   EPINEPHrine (EPIPEN) 0 3 mg/0 3 mL SOAJ Inject 0 3 mL (0 3 mg total) into a muscle once for 1 dose    latanoprost (XALATAN) 0 005 % ophthalmic solution Administer 1 drop to both eyes daily at bedtime   levothyroxine 50 mcg tablet TAKE 1 TABLET BY MOUTH  DAILY AS DIRECTED    loratadine (CLARITIN) 10 mg tablet Take 10 mg by mouth daily      Magnesium 400 MG TABS     Multiple Vitamin (MULTIVITAMIN) tablet Take 1 tablet by mouth daily   Turmeric 500 MG CAPS     venlafaxine (EFFEXOR-XR) 37 5 mg 24 hr capsule TAKE 1 CAPSULE BY MOUTH  DAILY    Vitamin D, Cholecalciferol, 1000 UNITS TABS Take 1,000 Units by mouth daily  No current facility-administered medications on file prior to visit  She is allergic to shellfish-derived products - food allergy       Review of Systems   Constitutional: Negative for activity change, appetite change, fatigue and unexpected weight change  Eyes: Negative for visual disturbance  Respiratory: Negative for cough, chest tightness, shortness of breath and wheezing  Cardiovascular: Negative for chest pain, palpitations and leg swelling  Breast: Patient denies tenderness, nipple discharge, masses, or erythema  Gastrointestinal: Negative for abdominal distention, abdominal pain, blood in stool, constipation, diarrhea, nausea and vomiting  Endocrine: Negative for cold intolerance and heat intolerance  Genitourinary: Negative for decreased urine volume, difficulty urinating, dyspareunia, dysuria, frequency, hematuria, menstrual problem, pelvic pain, urgency, vaginal bleeding, vaginal discharge and vaginal pain  Urgency with some stress incontinence after coffee  Sexually active up to 4 times a week  Musculoskeletal: Negative for arthralgias  Skin: Negative for rash  Neurological: Negative for weakness, light-headedness, numbness and headaches  Hematological: Does not bruise/bleed easily  Psychiatric/Behavioral: Negative for agitation, behavioral problems and sleep disturbance  The patient is not nervous/anxious  Objective:    Vitals:    01/17/22 1317   BP: 122/80   Weight: 74 5 kg (164 lb 3 2 oz)   Height: 5' 5 75" (1 67 m)            Physical Exam  Vitals and nursing note reviewed  Constitutional:       Appearance: She is well-developed  HENT:      Head: Normocephalic and atraumatic  Eyes:      General: No scleral icterus  Right eye: No discharge  Left eye: No discharge  Extraocular Movements: Extraocular movements intact  Conjunctiva/sclera: Conjunctivae normal    Neck:      Thyroid: No thyromegaly  Trachea: No tracheal deviation  Cardiovascular:      Rate and Rhythm: Normal rate and regular rhythm  Heart sounds: Normal heart sounds  No murmur heard  Pulmonary:      Effort: Pulmonary effort is normal  No respiratory distress  Breath sounds: Normal breath sounds  No wheezing  Chest:   Breasts: Breasts are symmetrical       Right: No inverted nipple, mass, nipple discharge, skin change or tenderness  Left: No inverted nipple, mass, nipple discharge, skin change or tenderness  Abdominal:      General: Bowel sounds are normal  There is no distension  Palpations: Abdomen is soft  There is no mass  Tenderness: There is no abdominal tenderness  There is no right CVA tenderness, left CVA tenderness or guarding  Genitourinary:     General: Normal vulva  Labia:         Right: No rash, tenderness or lesion  Left: No rash, tenderness or lesion  Vagina: Normal       Adnexa:         Right: No mass, tenderness or fullness  Left: No mass, tenderness or fullness  Rectum: No external hemorrhoid  Comments: Urethral meatus within normal limits  Perineum within normal limits  Bladder well supported  Uterus and cervix surgically removed, physiologic vaginal atrophy  Musculoskeletal:         General: Normal range of motion  Cervical back: Normal range of motion and neck supple  Skin:     General: Skin is warm and dry  Neurological:      Mental Status: She is alert and oriented to person, place, and time  Psychiatric:         Mood and Affect: Mood normal          Behavior: Behavior normal          Thought Content:  Thought content normal          Judgment: Judgment normal

## 2022-02-16 ENCOUNTER — HOSPITAL ENCOUNTER (OUTPATIENT)
Dept: RADIOLOGY | Facility: HOSPITAL | Age: 62
Discharge: HOME | End: 2022-02-16
Attending: OBSTETRICS & GYNECOLOGY
Payer: COMMERCIAL

## 2022-02-16 DIAGNOSIS — Z12.31 ENCOUNTER FOR SCREENING MAMMOGRAM FOR BREAST CANCER: ICD-10-CM

## 2022-02-16 DIAGNOSIS — R92.2 DENSE BREAST TISSUE ON MAMMOGRAM: ICD-10-CM

## 2022-02-16 PROCEDURE — 77067 SCR MAMMO BI INCL CAD: CPT

## 2022-02-16 PROCEDURE — 76641 ULTRASOUND BREAST COMPLETE: CPT

## 2022-02-16 PROCEDURE — 77063 BREAST TOMOSYNTHESIS BI: CPT

## 2022-03-31 DIAGNOSIS — F41.9 ANXIETY: ICD-10-CM

## 2022-04-01 RX ORDER — VENLAFAXINE HYDROCHLORIDE 37.5 MG/1
CAPSULE, EXTENDED RELEASE ORAL
Qty: 90 CAPSULE | Refills: 3 | Status: SHIPPED | OUTPATIENT
Start: 2022-04-01 | End: 2022-07-14 | Stop reason: SDUPTHER

## 2022-07-14 ENCOUNTER — OFFICE VISIT (OUTPATIENT)
Dept: FAMILY MEDICINE CLINIC | Facility: CLINIC | Age: 62
End: 2022-07-14
Payer: COMMERCIAL

## 2022-07-14 VITALS
WEIGHT: 172 LBS | DIASTOLIC BLOOD PRESSURE: 70 MMHG | HEART RATE: 75 BPM | OXYGEN SATURATION: 99 % | TEMPERATURE: 97.4 F | BODY MASS INDEX: 28.66 KG/M2 | RESPIRATION RATE: 18 BRPM | SYSTOLIC BLOOD PRESSURE: 122 MMHG | HEIGHT: 65 IN

## 2022-07-14 DIAGNOSIS — F41.9 ANXIETY: ICD-10-CM

## 2022-07-14 DIAGNOSIS — Z00.00 ANNUAL PHYSICAL EXAM: Primary | ICD-10-CM

## 2022-07-14 DIAGNOSIS — R01.1 MURMUR: ICD-10-CM

## 2022-07-14 DIAGNOSIS — Z13.0 SCREENING FOR DEFICIENCY ANEMIA: ICD-10-CM

## 2022-07-14 DIAGNOSIS — E03.9 PRIMARY HYPOTHYROIDISM: ICD-10-CM

## 2022-07-14 DIAGNOSIS — Z78.0 POSTMENOPAUSAL: ICD-10-CM

## 2022-07-14 DIAGNOSIS — Z13.6 SCREENING FOR CARDIOVASCULAR CONDITION: ICD-10-CM

## 2022-07-14 PROBLEM — Z01.419 ENCOUNTER FOR ANNUAL ROUTINE GYNECOLOGICAL EXAMINATION: Status: RESOLVED | Noted: 2018-09-30 | Resolved: 2022-07-14

## 2022-07-14 PROBLEM — Z90.722 STATUS POST TOTAL ABDOMINAL HYSTERECTOMY AND BILATERAL SALPINGO-OOPHORECTOMY (TAH-BSO): Status: RESOLVED | Noted: 2022-01-16 | Resolved: 2022-07-14

## 2022-07-14 PROBLEM — Z12.31 ENCOUNTER FOR SCREENING MAMMOGRAM FOR BREAST CANCER: Status: RESOLVED | Noted: 2018-09-30 | Resolved: 2022-07-14

## 2022-07-14 PROBLEM — Z90.79 STATUS POST TOTAL ABDOMINAL HYSTERECTOMY AND BILATERAL SALPINGO-OOPHORECTOMY (TAH-BSO): Status: RESOLVED | Noted: 2022-01-16 | Resolved: 2022-07-14

## 2022-07-14 PROBLEM — Z90.710 STATUS POST TOTAL ABDOMINAL HYSTERECTOMY AND BILATERAL SALPINGO-OOPHORECTOMY (TAH-BSO): Status: RESOLVED | Noted: 2022-01-16 | Resolved: 2022-07-14

## 2022-07-14 PROCEDURE — 3725F SCREEN DEPRESSION PERFORMED: CPT | Performed by: FAMILY MEDICINE

## 2022-07-14 PROCEDURE — 99396 PREV VISIT EST AGE 40-64: CPT | Performed by: FAMILY MEDICINE

## 2022-07-14 RX ORDER — VENLAFAXINE HYDROCHLORIDE 37.5 MG/1
37.5 CAPSULE, EXTENDED RELEASE ORAL DAILY
Qty: 90 CAPSULE | Refills: 3 | Status: SHIPPED | OUTPATIENT
Start: 2022-07-14

## 2022-07-14 RX ORDER — LEVOTHYROXINE SODIUM 0.05 MG/1
50 TABLET ORAL DAILY
Qty: 90 TABLET | Refills: 3 | Status: SHIPPED | OUTPATIENT
Start: 2022-07-14 | End: 2022-08-04 | Stop reason: SDUPTHER

## 2022-07-14 NOTE — PATIENT INSTRUCTIONS

## 2022-07-14 NOTE — PROGRESS NOTES
[address]    NAME: Lissette Barriga  AGE: 57 y o  SEX: female  : 1960     DATE: 2022     Assessment and Plan:     Problem List Items Addressed This Visit     Anxiety (Chronic)     Stable  Doing well on low dose venlafaxine            Relevant Medications    venlafaxine (EFFEXOR-XR) 37 5 mg 24 hr capsule    Murmur    Relevant Orders    Echo complete w/ contrast if indicated    Primary hypothyroidism    Relevant Medications    levothyroxine 50 mcg tablet    Other Relevant Orders    TSH, 3rd generation    T4, free      Other Visit Diagnoses     Annual physical exam    -  Primary    Screening for cardiovascular condition        Relevant Orders    Comprehensive metabolic panel    Lipid Panel with Direct LDL reflex    CT coronary calcium score    Postmenopausal        Relevant Orders    DXA bone density spine hip and pelvis    Screening for deficiency anemia        Relevant Orders    CBC        Return in 1 year (on 2023) for Annual physical      Immunizations and preventive care screenings were discussed with patient today  Appropriate education was printed on patient's after visit summary  Counseling:  Dental Health: discussed importance of regular tooth brushing, flossing, and dental visits  Exercise: the importance of regular exercise/physical activity was discussed  Recommend exercise 3-5 times per week for at least 30 minutes  BMI Counseling: Body mass index is 28 62 kg/m²  The BMI is above normal  Exercise recommendations include exercising 3-5 times per week  Rationale for BMI follow-up plan is due to patient being overweight or obese  Depression Screening and Follow-up Plan: Patient was screened for depression during today's encounter  They screened negative with a PHQ-2 score of 0          Return in 1 year (on 2023) for Annual physical      Chief Complaint:     Chief Complaint   Patient presents with   [facility] Physical Exam     [id number]      History of Present Illness:     Adult Annual Physical   Patient here for a comprehensive physical exam  The patient reports no problems  Diet and Physical Activity  Diet/Nutrition: well balanced diet  Exercise: regular exercise  Depression Screening  PHQ-2/9 Depression Screening    Little interest or pleasure in doing things: 0 - not at all  Feeling down, depressed, or hopeless: 0 - not at all  PHQ-2 Score: 0  PHQ-2 Interpretation: Negative depression screen       General Health  Sleep: sleeps well  Hearing: normal - bilateral   Vision: no vision problems  Dental: regular dental visits  /GYN Health  Patient is: postmenopausal       Review of Systems:     Review of Systems   Constitutional: Negative  Respiratory: Negative  Cardiovascular: Negative         Past Medical History:     Past Medical History:   Diagnosis Date    Arthritis     Complex cyst of right ovary     Disease of thyroid gland     Glaucoma     Hyperthyroidism     Uterus fibroma       Past Surgical History:     Past Surgical History:   Procedure Laterality Date     SECTION      X2    COLONOSCOPY      HYSTERECTOMY      LAPAROTOMY N/A 2016    Procedure: LAPAROTOMY EXPLORATORY; OVARIAN CANCER STAGING ;  Surgeon: Meaghan Mallory MD;  [facility];  Service:    Moose Lemme OOPHORECTOMY      IN HEMORRHOIDECTOMY,INT/EXT, 2+ COLUMNS/GROUPS N/A 2019    Procedure: HEMORRHOIDECTOMY EXCISION;  Surgeon: Lul Davis MD;  [facility];  Service: Colorectal    IN LAPAROSCOPY W TOT HYSTERECTUTERUS <=250 GRAM  W TUBE/OVARY N/A 2016    Procedure: HYSTERECTOMY LAPAROSCOPIC TOTAL (LTH),cystoscopy, prostoctopy;  Surgeon: Meaghan Mallory MD;  [facility];  Service: Gynecology Oncology    TUBAL LIGATION        Social History:     Social History     Socioeconomic History    Marital status: /Civil Union     Spouse name: None    Number of children: 2    Years of education: None    Highest education level: None   Occupational History     Comment: Full-time employment   Tobacco Use    Smoking status: Never Smoker    Smokeless tobacco: Never Used   Vaping Use    Vaping Use: Never used   Substance and Sexual Activity    Alcohol use: Yes     Alcohol/week: 2 0 standard drinks     Types: 2 Standard drinks or equivalent per week     Comment: social, Acute alcohol use    Drug use: Yes     Types: Marijuana     Comment: eatables    Sexual activity: Yes     Partners: Male     Birth control/protection: Female Sterilization   Other Topics Concern    None   Social History Narrative    Daily caffeine consumption, 2-3 servings a day    Exercises 1-2 times per week     Social Determinants of Health     Financial Resource Strain: Not on file   Food Insecurity: Not on file   Transportation Needs: Not on file   Physical Activity: Not on file   Stress: Not on file   Social Connections: Not on file   Intimate Partner Violence: Not on file   Housing Stability: Not on file      Family History:     Family History   Problem Relation Age of Onset    Heart disease Mother     Diabetes Father     No Known Problems Sister     Seizures Brother     Heart disease Brother     No Known Problems Daughter     No Known Problems Maternal Grandmother     No Known Problems Maternal Grandfather     No Known Problems Paternal Grandmother     No Known Problems Paternal Grandfather     Cancer Brother         lump on neck    No Known Problems Daughter     No Known Problems Maternal Aunt     No Known Problems Maternal Uncle     No Known Problems Paternal Aunt     No Known Problems Paternal Uncle     Breast cancer additional onset Neg Hx     Mental illness Neg Hx       Current Medications:     Current Outpatient Medications   Medication Sig Dispense Refill    b complex vitamins tablet Take 1 tablet by mouth daily   calcium carbonate (OS-MARVA) 600 MG tablet Take 600 mg by mouth daily   EPINEPHrine (EPIPEN) 0 3 mg/0 3 mL SOAJ Inject 0 3 mL (0 3 mg total) into a muscle once for 1 dose 0 6 mL 1    latanoprost (XALATAN) 0 005 % ophthalmic solution Administer 1 drop to both eyes daily at bedtime   levothyroxine 50 mcg tablet Take 1 tablet (50 mcg total) by mouth daily 90 tablet 3    loratadine (CLARITIN) 10 mg tablet Take 10 mg by mouth daily   Magnesium 400 MG TABS       Multiple Vitamin (MULTIVITAMIN) tablet Take 1 tablet by mouth daily   Turmeric 500 MG CAPS       venlafaxine (EFFEXOR-XR) 37 5 mg 24 hr capsule Take 1 capsule (37 5 mg total) by mouth daily 90 capsule 3    Vitamin D, Cholecalciferol, 1000 UNITS TABS Take 1,000 Units by mouth daily  No current facility-administered medications for this visit  Allergies: Allergies   Allergen Reactions    Shellfish-Derived Products - Food Allergy Anaphylaxis and Itching      Physical Exam:     /70   Pulse 75   Temp (!) 97 4 °F (36 3 °C)   Resp 18   Ht 5' 5" (1 651 m)   Wt 78 kg (172 lb)   SpO2 99%   BMI 28 62 kg/m²     Physical Exam  Vitals and nursing note reviewed  Constitutional:       Appearance: She is well-developed  HENT:      Head: Normocephalic and atraumatic  Right Ear: External ear normal       Left Ear: External ear normal       Nose: Nose normal    Cardiovascular:      Rate and Rhythm: Normal rate and regular rhythm  Heart sounds: Normal heart sounds  No murmur heard  No friction rub  Pulmonary:      Effort: No respiratory distress  Breath sounds: Normal breath sounds  No wheezing or rales  Musculoskeletal:      Right lower leg: No edema  Left lower leg: No edema  Neurological:      Mental Status: She is oriented to person, place, and time  Cranial Nerves: No cranial nerve deficit            Lafaye Pollen, [address]

## 2022-07-15 ENCOUNTER — HOSPITAL ENCOUNTER (EMERGENCY)
Facility: HOSPITAL | Age: 62
Discharge: HOME | End: 2022-07-15
Attending: EMERGENCY MEDICINE
Payer: COMMERCIAL

## 2022-07-15 VITALS
DIASTOLIC BLOOD PRESSURE: 80 MMHG | OXYGEN SATURATION: 97 % | SYSTOLIC BLOOD PRESSURE: 134 MMHG | RESPIRATION RATE: 18 BRPM | HEART RATE: 68 BPM | TEMPERATURE: 98.2 F

## 2022-07-15 DIAGNOSIS — T78.40XA ALLERGIC REACTION, INITIAL ENCOUNTER: ICD-10-CM

## 2022-07-15 DIAGNOSIS — L50.9 URTICARIA: Primary | ICD-10-CM

## 2022-07-15 PROCEDURE — 99284 EMERGENCY DEPT VISIT MOD MDM: CPT | Performed by: EMERGENCY MEDICINE

## 2022-07-15 PROCEDURE — 96374 THER/PROPH/DIAG INJ IV PUSH: CPT

## 2022-07-15 PROCEDURE — 99283 EMERGENCY DEPT VISIT LOW MDM: CPT

## 2022-07-15 PROCEDURE — 96375 TX/PRO/DX INJ NEW DRUG ADDON: CPT

## 2022-07-15 RX ORDER — FAMOTIDINE 10 MG/ML
20 INJECTION, SOLUTION INTRAVENOUS ONCE
Status: COMPLETED | OUTPATIENT
Start: 2022-07-15 | End: 2022-07-15

## 2022-07-15 RX ORDER — METHYLPREDNISOLONE SODIUM SUCCINATE 125 MG/2ML
125 INJECTION, POWDER, LYOPHILIZED, FOR SOLUTION INTRAMUSCULAR; INTRAVENOUS ONCE
Status: COMPLETED | OUTPATIENT
Start: 2022-07-15 | End: 2022-07-15

## 2022-07-15 RX ORDER — PREDNISONE 20 MG/1
20 TABLET ORAL 2 TIMES DAILY
Qty: 20 TABLET | Refills: 0 | Status: SHIPPED | OUTPATIENT
Start: 2022-07-15 | End: 2022-07-25

## 2022-07-15 RX ADMIN — METHYLPREDNISOLONE SODIUM SUCCINATE 125 MG: 125 INJECTION, POWDER, FOR SOLUTION INTRAMUSCULAR; INTRAVENOUS at 19:36

## 2022-07-15 RX ADMIN — FAMOTIDINE 20 MG: 10 INJECTION, SOLUTION INTRAVENOUS at 19:37

## 2022-07-15 NOTE — ED PROVIDER NOTES
History  Chief Complaint   Patient presents with    Rash     Allergic rxn (pt thinks eating shellfish), rash on face arms     Patient is allergic to shellfish  She was enjoying a meal of swordfish at approximately 1600 hours today  She states about 2 hours later she noticed itching and erythema in both of her palms of her hands  And she started developing an itchy rash on exposed and unexposed part of her body  She had no shortness of breath, no tight feeling in her throat, no difficulty swallowing or voice change  No stridor wheezing  Patient took 50 mg of Benadryl prior to arrival without significant improvement  Patient has an epi pen, but did not use it          Prior to Admission Medications   Prescriptions Last Dose Informant Patient Reported? Taking? EPINEPHrine (EPIPEN) 0 3 mg/0 3 mL SOAJ   No No   Sig: Inject 0 3 mL (0 3 mg total) into a muscle once for 1 dose   Magnesium 400 MG TABS   Yes No   Multiple Vitamin (MULTIVITAMIN) tablet  Self Yes No   Sig: Take 1 tablet by mouth daily  Turmeric 500 MG CAPS   Yes No   Vitamin D, Cholecalciferol, 1000 UNITS TABS  Self Yes No   Sig: Take 1,000 Units by mouth daily  b complex vitamins tablet  Self Yes No   Sig: Take 1 tablet by mouth daily  calcium carbonate (OS-MARVA) 600 MG tablet  Self Yes No   Sig: Take 600 mg by mouth daily  latanoprost (XALATAN) 0 005 % ophthalmic solution  Self Yes No   Sig: Administer 1 drop to both eyes daily at bedtime  levothyroxine 50 mcg tablet   No No   Sig: Take 1 tablet (50 mcg total) by mouth daily   loratadine (CLARITIN) 10 mg tablet  Self Yes No   Sig: Take 10 mg by mouth daily     venlafaxine (EFFEXOR-XR) 37 5 mg 24 hr capsule   No No   Sig: Take 1 capsule (37 5 mg total) by mouth daily      Facility-Administered Medications: None       Past Medical History:   Diagnosis Date    Arthritis     Complex cyst of right ovary     Disease of thyroid gland     Glaucoma     Hyperthyroidism     Uterus fibroma Past Surgical History:   Procedure Laterality Date     SECTION      X2    COLONOSCOPY      HYSTERECTOMY      LAPAROTOMY N/A 2016    Procedure: LAPAROTOMY EXPLORATORY; OVARIAN CANCER STAGING ;  Surgeon: Keith Marquez MD;  [facility];  Service:    Vanice Flies      VT HEMORRHOIDECTOMY,INT/EXT, 2+ COLUMNS/GROUPS N/A 2019    Procedure: HEMORRHOIDECTOMY EXCISION;  Surgeon: Maria Luisa Lyon MD;  [facility];  Service: Colorectal    VT LAPAROSCOPY W TOT HYSTERECTUTERUS <=250 GRAM  W TUBE/OVARY N/A 2016    Procedure: HYSTERECTOMY LAPAROSCOPIC TOTAL (LTH),cystoscopy, prostoctopy;  Surgeon: Keith Marquez MD;  [facility];  Service: Gynecology Oncology    TUBAL LIGATION         Family History   Problem Relation Age of Onset    Heart disease Mother     Diabetes Father     No Known Problems Sister     Seizures Brother     Heart disease Brother     No Known Problems Daughter     No Known Problems Maternal Grandmother     No Known Problems Maternal Grandfather     No Known Problems Paternal Grandmother     No Known Problems Paternal Grandfather     Cancer Brother         lump on neck    No Known Problems Daughter     No Known Problems Maternal Aunt     No Known Problems Maternal Uncle     No Known Problems Paternal Aunt     No Known Problems Paternal Uncle     Breast cancer additional onset Neg Hx     Mental illness Neg Hx      I have reviewed and agree with the history as documented  E-Cigarette/Vaping    E-Cigarette Use Never User      E-Cigarette/Vaping Substances    Nicotine No     THC No     CBD No     Flavoring No     Other No     Unknown No      Social History     Tobacco Use    Smoking status: Never Smoker    Smokeless tobacco: Never Used   Vaping Use    Vaping Use: Never used   Substance Use Topics    Alcohol use:  Yes     Alcohol/week: 2 0 standard drinks     Types: 2 Standard drinks or equivalent per week     Comment: social, Acute alcohol use    Drug use: Yes     Types: Marijuana     Comment: eatables       Review of Systems   Constitutional: Negative for chills and fever  HENT: Negative for congestion, sore throat, trouble swallowing and voice change  Eyes: Negative for visual disturbance  Respiratory: Negative for cough, chest tightness, shortness of breath, wheezing and stridor  Cardiovascular: Negative for chest pain  Gastrointestinal: Negative for abdominal pain and vomiting  Genitourinary: Negative for dysuria  Musculoskeletal: Negative for arthralgias, back pain and neck pain  Skin: Positive for rash  Neurological: Negative for headaches  Hematological: Does not bruise/bleed easily  Psychiatric/Behavioral: Negative for confusion  All other systems reviewed and are negative  Physical Exam  Physical Exam  Vitals and nursing note reviewed  Constitutional:       Appearance: Normal appearance  HENT:      Head: Normocephalic  Right Ear: External ear normal       Left Ear: External ear normal       Nose: Nose normal       Mouth/Throat:      Mouth: Mucous membranes are moist       Pharynx: Oropharynx is clear  Comments: Airway widely patent  Eyes:      Conjunctiva/sclera: Conjunctivae normal    Cardiovascular:      Rate and Rhythm: Normal rate  Rhythm irregular  Pulses: Normal pulses  Pulmonary:      Effort: Pulmonary effort is normal       Breath sounds: Normal breath sounds  No wheezing  Abdominal:      Palpations: Abdomen is soft  Tenderness: There is no abdominal tenderness  Musculoskeletal:         General: Normal range of motion  Cervical back: Normal range of motion and neck supple  Skin:     General: Skin is warm and dry  Capillary Refill: Capillary refill takes less than 2 seconds  Findings: Erythema and rash present  Neurological:      General: No focal deficit present        Mental Status: She is alert and oriented to person, place, and time    Psychiatric:         Mood and Affect: Mood normal          Behavior: Behavior normal          Vital Signs  ED Triage Vitals   Temperature Pulse Respirations Blood Pressure SpO2   07/15/22 1915 07/15/22 1915 07/15/22 1915 07/15/22 1915 07/15/22 1915   98 2 °F (36 8 °C) 66 18 159/77 97 %      Temp Source Heart Rate Source Patient Position - Orthostatic VS BP Location FiO2 (%)   07/15/22 1915 07/15/22 1915 07/15/22 1915 07/15/22 1915 --   Oral Monitor Sitting Left arm       Pain Score       07/15/22 1941       4           Vitals:    07/15/22 1915   BP: 159/77   Pulse: 66   Patient Position - Orthostatic VS: Sitting         Visual Acuity      ED Medications  Medications   methylPREDNISolone sodium succinate (Solu-MEDROL) injection 125 mg (125 mg Intravenous Given 7/15/22 1936)   Famotidine (PF) (PEPCID) injection 20 mg (20 mg Intravenous Given 7/15/22 1937)       Diagnostic Studies  Results Reviewed     None                 No orders to display              Procedures  Procedures         ED Course                                             MDM  Number of Diagnoses or Management Options  Diagnosis management comments: Pruritic rash on exposed on exposed parts of the body, consistent with cell mediated immunity  Disposition  Final diagnoses:   Urticaria   Allergic reaction, initial encounter     Time reflects when diagnosis was documented in both MDM as applicable and the Disposition within this note     Time User Action Codes Description Comment    7/15/2022  8:32 PM Saul CHANCE Add [L50 9] Urticaria     7/15/2022  8:33 PM Romana Borders Add [T78 40XA] Allergic reaction, initial encounter       ED Disposition     ED Disposition   Discharge    Condition   Stable    Date/Time   Fri Jul 15, 2022  8:32 PM    [facility] discharge to home/self care                 Follow-up Information     Follow up With Specialties Details Why Contact Stephanie Harris DO Family Medicine Schedule an appointment as soon as possible for a visit   [address] [facility]  [phone]            Patient's Medications   Discharge Prescriptions    PREDNISONE 20 MG TABLET    Take 1 tablet (20 mg total) by mouth 2 (two) times a day for 10 days       Start Date: 7/15/2022 End Date: 7/25/2022       Order Dose: 20 mg       Quantity: 20 tablet    Refills: 0       No discharge procedures on file      PDMP Review     None          ED Provider  Electronically Signed by           Freeman Chan MD  07/15/22 2034

## 2022-07-20 ENCOUNTER — TELEPHONE (OUTPATIENT)
Dept: FAMILY MEDICINE CLINIC | Facility: CLINIC | Age: 62
End: 2022-07-20

## 2022-07-20 NOTE — LETTER
[facility]  [address]  [address]  [place] [address]    Date: 07/22/22  Magno Abdi  [address] [address]    Dear Emilia Nieto: Thank you for choosing [facility] emergency department for care  As your primary care provider we want to make sure that your ongoing medical care is being addressed  If you require follow up care as a result of your emergency department visit, there are a few things we would like you to know  As part of our continuing commitment to caring for our patient, we have added more same day appointments and have extended our office hours to meet your medical needs  After hours, on-call physicians are available via our main office line  We encourage you to contact our office prior to seeking treatment to discuss your symptoms with our medical staff  Together, we can determine the correct course of action  A majority of non-emergent conditions such as: common cold, flu-like symptoms, fevers, strains/sprains, dislocations, minor burns, cuts and animal bites can be treated at [facility] facilities  Diagnostic testing is available at some sites  Of course, if you are experiencing a life threatening medical emergency call 911 or proceed directly to the nearest emergency room      Your nearest [facility] facility is conveniently located at:    [address] [place] [address] 27, [facility] 6  [phone]  SKIP THE WAIT  Conveniently offered at most Care Now locations  [place] your spot online at www [email or web address] or on the [facility] 16    Sincerely,    [facility]  Dept: [phone] No

## 2022-07-20 NOTE — LETTER
[facility]  [address] [facility] [address]    Date: 07/22/22  Vivek Christian  [address] [address]    Dear Aster Romero: Thank you for choosing [facility] emergency department for care  As your primary care provider we want to make sure that your ongoing medical care is being addressed  If you require follow up care as a result of your emergency department visit, there are a few things we would like you to know  As part of our continuing commitment to caring for our patient, we have added more same day appointments and have extended our office hours to meet your medical needs  After hours, on-call physicians are available via our main office line  We encourage you to contact our office prior to seeking treatment to discuss your symptoms with our medical staff  Together, we can determine the correct course of action  A majority of non-emergent conditions such as: common cold, flu-like symptoms, fevers, strains/sprains, dislocations, minor burns, cuts and animal bites can be treated at [facility] facilities  Diagnostic testing is available at some sites  Of course, if you are experiencing a life threatening medical emergency call 911 or proceed directly to the nearest emergency room  Your nearest [facility] [employer] Now facility is conveniently located at:    [email or web address]}  SKIP THE WAIT  Conveniently offered at most Care Now locations  [employer] your spot online at [email or web address] or on the [facility] 67    Sincerely,    [facility]  No information on file

## 2022-07-20 NOTE — TELEPHONE ENCOUNTER
Emily Kaminski    ED Visit Information     Ed visit date: 7/15/22  Diagnosis Description: Rash  In Network? Yes Moises Hidimitrios  Discharge status: Home  Discharged with meds ? Yes  Number of ED visits to date: 1  ED Severity:n/a     Outreach Information    Outreach successful: Yes 1  Date letter mailed7/21/22  Date Finalized:  7/21/22    Care Coordination    Follow up appointment with pcp: yes letter mailed  Transportation issues ?  No    [facility] [employer] [facility]

## 2022-07-28 ENCOUNTER — HOSPITAL ENCOUNTER (OUTPATIENT)
Dept: RADIOLOGY | Facility: HOSPITAL | Age: 62
Discharge: HOME | End: 2022-07-28
Payer: COMMERCIAL

## 2022-07-28 DIAGNOSIS — Z78.0 POSTMENOPAUSAL: ICD-10-CM

## 2022-07-28 PROBLEM — M85.80 LOW BONE MASS: Status: ACTIVE | Noted: 2022-07-28

## 2022-07-28 PROCEDURE — 77080 DXA BONE DENSITY AXIAL: CPT

## 2022-08-04 LAB
ALBUMIN SERPL-MCNC: 4.6 G/DL (ref 3.8–4.8)
ALBUMIN/GLOB SERPL: 2.3 {RATIO} (ref 1.2–2.2)
ALP SERPL-CCNC: 91 IU/L (ref 44–121)
ALT SERPL-CCNC: 23 IU/L (ref 0–32)
AST SERPL-CCNC: 22 IU/L (ref 0–40)
BILIRUB SERPL-MCNC: 0.5 MG/DL (ref 0–1.2)
BUN SERPL-MCNC: 11 MG/DL (ref 8–27)
BUN/CREAT SERPL: 15 (ref 12–28)
CALCIUM SERPL-MCNC: 9.4 MG/DL (ref 8.7–10.3)
CHLORIDE SERPL-SCNC: 101 MMOL/L (ref 96–106)
CHOLEST SERPL-MCNC: 219 MG/DL (ref 100–199)
CO2 SERPL-SCNC: 26 MMOL/L (ref 20–29)
CREAT SERPL-MCNC: 0.71 MG/DL (ref 0.57–1)
EGFR: 95 ML/MIN/1.73
ERYTHROCYTE [DISTWIDTH] IN BLOOD BY AUTOMATED COUNT: 11.9 % (ref 11.7–15.4)
GLOBULIN SER-MCNC: 2 G/DL (ref 1.5–4.5)
GLUCOSE SERPL-MCNC: 92 MG/DL (ref 65–99)
HCT VFR BLD AUTO: 39.3 % (ref 34–46.6)
HDLC SERPL-MCNC: 84 MG/DL
HGB BLD-MCNC: 13.3 G/DL (ref 11.1–15.9)
LDLC SERPL CALC-MCNC: 123 MG/DL (ref 0–99)
LDLC/HDLC SERPL: 1.5 RATIO (ref 0–3.2)
MCH RBC QN AUTO: 32.4 PG (ref 26.6–33)
MCHC RBC AUTO-ENTMCNC: 33.8 G/DL (ref 31.5–35.7)
MCV RBC AUTO: 96 FL (ref 79–97)
MICRODELETION SYND BLD/T FISH: NORMAL
PLATELET # BLD AUTO: 257 X10E3/UL (ref 150–450)
POTASSIUM SERPL-SCNC: 4.3 MMOL/L (ref 3.5–5.2)
PROT SERPL-MCNC: 6.6 G/DL (ref 6–8.5)
RBC # BLD AUTO: 4.11 X10E6/UL (ref 3.77–5.28)
SL AMB VLDL CHOLESTEROL CALC: 12 MG/DL (ref 5–40)
SODIUM SERPL-SCNC: 140 MMOL/L (ref 134–144)
T4 FREE SERPL-MCNC: 1.23 NG/DL (ref 0.82–1.77)
TRIGL SERPL-MCNC: 68 MG/DL (ref 0–149)
TSH SERPL DL<=0.005 MIU/L-ACNC: 8.05 UIU/ML (ref 0.45–4.5)
WBC # BLD AUTO: 4.6 X10E3/UL (ref 3.4–10.8)

## 2022-08-15 ENCOUNTER — TELEPHONE (OUTPATIENT)
Dept: FAMILY MEDICINE CLINIC | Facility: CLINIC | Age: 62
End: 2022-08-15

## 2022-08-15 NOTE — TELEPHONE ENCOUNTER
8/15/2022 8:28 AM Called patient  Her insurance is not approving the echocardiogram for her murmur  Left message on her voicemail to call the office     Angelica Duque, DO

## 2022-08-25 ENCOUNTER — HOSPITAL ENCOUNTER (OUTPATIENT)
Dept: CT IMAGING | Facility: HOSPITAL | Age: 62
Discharge: HOME | End: 2022-08-25
Payer: COMMERCIAL

## 2022-08-25 DIAGNOSIS — Z13.6 SCREENING FOR CARDIOVASCULAR CONDITION: ICD-10-CM

## 2022-08-25 PROCEDURE — 75571 CT HRT W/O DYE W/CA TEST: CPT

## 2022-08-25 PROCEDURE — G1004 CDSM NDSC: HCPCS

## 2022-12-10 ENCOUNTER — OFFICE VISIT (OUTPATIENT)
Dept: FAMILY MEDICINE CLINIC | Facility: CLINIC | Age: 62
End: 2022-12-10

## 2022-12-10 VITALS
HEIGHT: 65 IN | SYSTOLIC BLOOD PRESSURE: 120 MMHG | HEART RATE: 87 BPM | BODY MASS INDEX: 28.99 KG/M2 | DIASTOLIC BLOOD PRESSURE: 72 MMHG | RESPIRATION RATE: 16 BRPM | WEIGHT: 174 LBS | TEMPERATURE: 95 F | OXYGEN SATURATION: 96 %

## 2022-12-10 DIAGNOSIS — L98.9 SKIN LESION: ICD-10-CM

## 2022-12-10 DIAGNOSIS — L30.9 DERMATITIS: Primary | ICD-10-CM

## 2022-12-10 DIAGNOSIS — E03.9 PRIMARY HYPOTHYROIDISM: ICD-10-CM

## 2022-12-10 RX ORDER — LEVOTHYROXINE SODIUM 0.07 MG/1
75 TABLET ORAL DAILY
Qty: 90 TABLET | Refills: 3 | Status: SHIPPED | OUTPATIENT
Start: 2022-12-10

## 2022-12-10 RX ORDER — BETAMETHASONE DIPROPIONATE 0.5 MG/G
CREAM TOPICAL 2 TIMES DAILY
Qty: 30 G | Refills: 0 | Status: SHIPPED | OUTPATIENT
Start: 2022-12-10

## 2022-12-10 NOTE — PROGRESS NOTES
Name: Zev Stone      : 1960      MRN: [id number]  Encounter Provider: Lenin Khan DO  Encounter Date: 12/10/2022   Encounter department: [address] [place] [address]     1  Dermatitis  -     betamethasone dipropionate (DIPROSONE) 0 05 % cream; Apply topically 2 (two) times a day  -     Ambulatory referral to Dermatology; Future    2  Primary hypothyroidism  -     levothyroxine 75 mcg tablet; Take 1 tablet (75 mcg total) by mouth daily    3  Skin lesion  Comments:  has multiple lesions on her back that she would like checked   Orders:  -     Ambulatory referral to Dermatology; Future      Return if symptoms worsen or fail to improve  Subjective      She has a rash on her right hand for the past 1 5 weeks  It started on the tip on her 3rd finger and then went closer to her palm  She denies any new soap or lotions  Rash  This is a new problem  The current episode started 1 to 4 weeks ago  The problem has been gradually worsening since onset  The rash is characterized by dryness, redness, swelling and peeling  It is unknown if there was an exposure to a precipitant  Pertinent negatives include no anorexia, congestion, cough, diarrhea, facial edema, fatigue, fever, joint pain, rhinorrhea, shortness of breath, sore throat or vomiting  Review of Systems   Constitutional: Negative for fatigue and fever  HENT: Negative for congestion, rhinorrhea and sore throat  Eyes: Negative for pain  Respiratory: Negative for cough and shortness of breath  Gastrointestinal: Negative for anorexia, diarrhea and vomiting  Musculoskeletal: Negative for joint pain  Skin: Positive for rash  Current Outpatient Medications on File Prior to Visit   Medication Sig   • b complex vitamins tablet Take 1 tablet by mouth daily  • calcium carbonate (OS-MARVA) 600 MG tablet Take 600 mg by mouth daily     • EPINEPHrine (EPIPEN) 0 3 mg/0 3 mL SOAJ Inject 0 3 mL (0 3 mg total) into a muscle once for 1 dose   • latanoprost (XALATAN) 0 005 % ophthalmic solution Administer 1 drop to both eyes daily at bedtime  • loratadine (CLARITIN) 10 mg tablet Take 10 mg by mouth daily  • Magnesium 400 MG TABS    • Multiple Vitamin (MULTIVITAMIN) tablet Take 1 tablet by mouth daily  • Turmeric 500 MG CAPS    • venlafaxine (EFFEXOR-XR) 37 5 mg 24 hr capsule Take 1 capsule (37 5 mg total) by mouth daily   • Vitamin D, Cholecalciferol, 1000 UNITS TABS Take 1,000 Units by mouth daily  • [DISCONTINUED] levothyroxine 75 mcg tablet Take 1 tablet (75 mcg total) by mouth daily       Objective     /72   Pulse 87   Temp (!) 95 °F (35 °C)   Resp 16   Ht 5' 5" (1 651 m)   Wt 78 9 kg (174 lb)   SpO2 96%   BMI 28 96 kg/m²     Physical Exam  Vitals and nursing note reviewed  Constitutional:       Appearance: She is well-developed  Cardiovascular:      Rate and Rhythm: Normal rate and regular rhythm  Heart sounds: Normal heart sounds  Pulmonary:      Effort: Pulmonary effort is normal  No respiratory distress  Breath sounds: Normal breath sounds  No wheezing  Skin:     Findings: Rash (rash on right 3rd finger, see picture) present  Stan Sportsman, DO  Answers for HPI/ROS submitted by the patient on 12/9/2022  nail changes:  No

## 2023-01-25 PROBLEM — Z01.419 ENCOUNTER FOR ANNUAL ROUTINE GYNECOLOGICAL EXAMINATION: Status: ACTIVE | Noted: 2023-01-25

## 2023-01-25 PROBLEM — Z12.31 ENCOUNTER FOR SCREENING MAMMOGRAM FOR BREAST CANCER: Status: ACTIVE | Noted: 2023-01-25

## 2023-01-25 NOTE — PROGRESS NOTES
Assessment/Plan:  NGE    s/p TL-BSO 9/16  Dyspareunia/AV- continue with over-the-counter lubricant  Feels hot all the time, continued mild hot flashes and night sweats along with a 17 lb weight gain '21 which may act as insulation    She is much more active    She had a sedentary job    Black cohosh 20 mg b i d  failed '21 and she already was on Effexor  Osteopenia - 7/22 PCP - FRAX 1/10%   Co- Testing - NA    RTO 1 yr   a  SBE monthly  3 D Mammography   Dense Breasts- ABUS  Colonoscopy- 1 polyp '15, repeated 1/21  Exercise 3/wk - adequate now, works FT, lost 6 #'s '18, gained 5 '19, 17 '20  Calcium 1,200 mg/d with Vit D - too much '21       Depression screen:  Negative       Diagnoses and all orders for this visit:    Encounter for annual routine gynecological examination    Encounter for screening mammogram for breast cancer  -     Mammo screening bilateral w 3d & cad; Future    Dense breast tissue on mammogram  -     US breast screening bilateral complete (ABUS); Future    Atrophic vaginitis    History of total hysterectomy with bilateral salpingo-oophorectomy (BSO)    Osteopenia of multiple sites              Subjective:        Patient ID: Debi Donato is a 57 y o  female  Chanell Ricardo presents for a yearly evaluation  She has continued mild discomfort with intercourse that is adequately addressed with a lubricant  [place] is twice a week  She was diagnosed with osteopenia in July  Her PCP had recommended additional calcium but not exercise  The benefits of calcium were fully explained including how to perform them  The following portions of the patient's history were reviewed and updated as appropriate: She  has a past medical history of Arthritis, Complex cyst of right ovary, Disease of thyroid gland, Glaucoma, Hyperthyroidism, and Uterus fibroma    Patient Active Problem List    Diagnosis Date Noted   • Encounter for annual routine gynecological examination 01/25/2023   • Encounter for screening mammogram for breast cancer 2023   • Low bone mass 2022   • Murmur 2022   • Dense breast tissue on mammogram 2022   • Atrophic vaginitis 2022   • History of total hysterectomy with bilateral salpingo-oophorectomy (BSO) 2022   • Elevated ALT measurement 10/30/2020   • Acquired deformity of foot 2018   • Valgus deformity of great toe 2018   • Congenital pes planus of right foot 2018   • Congenital pes planus of left foot 2018   • Goiter 2017   • Adnexal mass 2016   • Anxiety 2016   • Primary hypothyroidism 2016   • ALYCIA (obstructive sleep apnea) 2016   • Allergic rhinitis 2014   PMH:   2 para 2; C/S X 2- 1st CPD/OP, 2nd Repeat- ,   Hyperthyroidism with goiter   Anxiety  Uterine fibroid  LTL   Menopause - 54  Hypothyroidism   Colonoscopy 4/15 one polyp  Heather  wnl  Dyspareunia, AV      Complex Pelvic Mass- TL H, BSO, AMINTA  Cystoscopy, proctoscopy- endometriosis, extensive adhesions,    6 2 right ovarian serous cystadenofibroma, simple cystic endometrial hyperplasia with mild atypia  16         ALYCIA 16       Avulsion fracture right ankle, sprained twice- summer '19       Hemorrhoidectomy        Osteopenia  FRAX 1/10 %   She  has a past surgical history that includes  section; Tubal ligation; pr laps total hysterect 250 gm/< w/rmvl tube/ovary (N/A, 2016); LAPAROTOMY (N/A, 2016); Colonoscopy; Hysterectomy; pr hemorrhoidectomy int & xtrnl 2/> column/pascale (N/A, 2019); and Oophorectomy  Her family history includes Cancer in her brother; Diabetes in her father; Heart disease in her brother and mother; No Known Problems in her daughter, daughter, maternal aunt, maternal grandfather, maternal grandmother, maternal uncle, paternal aunt, paternal grandfather, paternal grandmother, paternal uncle, and sister; Seizures in her brother     FH:  Brother 67 with tonsil or tongue cancer   F d 80  M- Uterine Ca 93, d 95 of natural causes, HTN  PGM- MI  PGF - DM  B- Pneumonia, Cardiac arrest d 69   She  reports that she has never smoked  She has never used smokeless tobacco  She reports current alcohol use of about 2 0 standard drinks per week  She reports current drug use  Drug: Marijuana  SH:    '81  Chelsi- in [place] had her 31st anniversary at work  Her  already is retired  She retired 5/20  Her  had a seizure and cannot drive for 6 months  Current Outpatient Medications   Medication Sig Dispense Refill   • b complex vitamins tablet Take 1 tablet by mouth daily  • betamethasone dipropionate (DIPROSONE) 0 05 % cream Apply topically 2 (two) times a day 30 g 0   • calcium carbonate (OS-MARVA) 600 MG tablet Take 600 mg by mouth daily  • EPINEPHrine (EPIPEN) 0 3 mg/0 3 mL SOAJ Inject 0 3 mL (0 3 mg total) into a muscle once for 1 dose 0 6 mL 1   • latanoprost (XALATAN) 0 005 % ophthalmic solution Administer 1 drop to both eyes daily at bedtime  • levothyroxine 75 mcg tablet Take 1 tablet (75 mcg total) by mouth daily 90 tablet 3   • loratadine (CLARITIN) 10 mg tablet Take 10 mg by mouth daily  • Magnesium 400 MG TABS      • Multiple Vitamin (MULTIVITAMIN) tablet Take 1 tablet by mouth daily  • Turmeric 500 MG CAPS      • venlafaxine (EFFEXOR-XR) 37 5 mg 24 hr capsule Take 1 capsule (37 5 mg total) by mouth daily 90 capsule 3   • Vitamin D, Cholecalciferol, 1000 UNITS TABS Take 1,000 Units by mouth daily  No current facility-administered medications for this visit  Current Outpatient Medications on File Prior to Visit   Medication Sig   • b complex vitamins tablet Take 1 tablet by mouth daily  • betamethasone dipropionate (DIPROSONE) 0 05 % cream Apply topically 2 (two) times a day   • calcium carbonate (OS-MARVA) 600 MG tablet Take 600 mg by mouth daily     • EPINEPHrine (EPIPEN) 0 3 mg/0 3 mL SOAJ Inject 0 3 mL (0 3 mg total) into a muscle once for 1 dose   • latanoprost (XALATAN) 0 005 % ophthalmic solution Administer 1 drop to both eyes daily at bedtime  • levothyroxine 75 mcg tablet Take 1 tablet (75 mcg total) by mouth daily   • loratadine (CLARITIN) 10 mg tablet Take 10 mg by mouth daily  • Magnesium 400 MG TABS    • Multiple Vitamin (MULTIVITAMIN) tablet Take 1 tablet by mouth daily  • Turmeric 500 MG CAPS    • venlafaxine (EFFEXOR-XR) 37 5 mg 24 hr capsule Take 1 capsule (37 5 mg total) by mouth daily   • Vitamin D, Cholecalciferol, 1000 UNITS TABS Take 1,000 Units by mouth daily  No current facility-administered medications on file prior to visit  She is allergic to shellfish-derived products - food allergy       Review of Systems   Constitutional: Positive for unexpected weight change (Gained 8 pounds)  Negative for activity change, appetite change and fatigue  Eyes: Negative for visual disturbance  Respiratory: Negative for cough, chest tightness, shortness of breath and wheezing  Cardiovascular: Negative for chest pain, palpitations and leg swelling  Breast: Patient denies tenderness, nipple discharge, masses, or erythema  Gastrointestinal: Negative for abdominal distention, abdominal pain, blood in stool, constipation, diarrhea, nausea and vomiting  Endocrine: Negative for cold intolerance and heat intolerance  Genitourinary: Negative for decreased urine volume, difficulty urinating, dyspareunia, dysuria, frequency, hematuria, menstrual problem, pelvic pain, urgency, vaginal bleeding, vaginal discharge and vaginal pain  Musculoskeletal: Negative for arthralgias  Skin: Negative for rash  Neurological: Negative for weakness, light-headedness, numbness and headaches  Hematological: Does not bruise/bleed easily  Psychiatric/Behavioral: Negative for agitation, behavioral problems and sleep disturbance  The patient is not nervous/anxious            Objective:    Vitals:    01/26/23 1018   BP: 120/86   BP Location: Right arm   Patient Position: Sitting   Cuff Size: Standard   Weight: 78 3 kg (172 lb 9 6 oz)   Height: 5' 6" (1 676 m)            Physical Exam  Vitals and nursing note reviewed  Constitutional:       Appearance: She is well-developed  HENT:      Head: Normocephalic and atraumatic  Eyes:      Extraocular Movements: Extraocular movements intact  Conjunctiva/sclera: Conjunctivae normal       Pupils: Pupils are equal, round, and reactive to light  Neck:      Thyroid: No thyromegaly  Trachea: No tracheal deviation  Cardiovascular:      Rate and Rhythm: Normal rate and regular rhythm  Heart sounds: Normal heart sounds  No murmur heard  Pulmonary:      Effort: Pulmonary effort is normal  No respiratory distress  Breath sounds: Normal breath sounds  No wheezing  Chest:   Breasts:     Breasts are symmetrical       Right: No inverted nipple, mass, nipple discharge, skin change or tenderness  Left: No inverted nipple, mass, nipple discharge, skin change or tenderness  Abdominal:      General: Bowel sounds are normal  There is no distension  Palpations: Abdomen is soft  There is no mass  Tenderness: There is no abdominal tenderness  Genitourinary:     General: Normal vulva  Labia:         Right: No rash, tenderness or lesion  Left: No rash, tenderness or lesion  Vagina: Normal       Adnexa:         Right: No mass, tenderness or fullness  Left: No mass, tenderness or fullness  Rectum: No external hemorrhoid  Comments: Urethral meatus within normal limits  Perineum within normal limits  Bladder well supported  Uterus and cervix surgically removed  Physiologic vaginal atrophy  Musculoskeletal:         General: Normal range of motion  Cervical back: Normal range of motion and neck supple  Skin:     General: Skin is warm and dry     Neurological:      Mental Status: She is alert and oriented to person, place, and time  Psychiatric:         Mood and Affect: Mood normal          Behavior: Behavior normal          Thought Content:  Thought content normal          Judgment: Judgment normal

## 2023-01-26 ENCOUNTER — ANNUAL EXAM (OUTPATIENT)
Dept: OBGYN CLINIC | Facility: CLINIC | Age: 63
End: 2023-01-26

## 2023-01-26 VITALS
SYSTOLIC BLOOD PRESSURE: 120 MMHG | BODY MASS INDEX: 27.74 KG/M2 | HEIGHT: 66 IN | DIASTOLIC BLOOD PRESSURE: 86 MMHG | WEIGHT: 172.6 LBS

## 2023-01-26 DIAGNOSIS — Z01.419 ENCOUNTER FOR ANNUAL ROUTINE GYNECOLOGICAL EXAMINATION: Primary | ICD-10-CM

## 2023-01-26 DIAGNOSIS — N95.2 ATROPHIC VAGINITIS: ICD-10-CM

## 2023-01-26 DIAGNOSIS — Z90.710 HISTORY OF TOTAL HYSTERECTOMY WITH BILATERAL SALPINGO-OOPHORECTOMY (BSO): ICD-10-CM

## 2023-01-26 DIAGNOSIS — R92.2 DENSE BREAST TISSUE ON MAMMOGRAM: ICD-10-CM

## 2023-01-26 DIAGNOSIS — Z90.79 HISTORY OF TOTAL HYSTERECTOMY WITH BILATERAL SALPINGO-OOPHORECTOMY (BSO): ICD-10-CM

## 2023-01-26 DIAGNOSIS — Z12.31 ENCOUNTER FOR SCREENING MAMMOGRAM FOR BREAST CANCER: ICD-10-CM

## 2023-01-26 DIAGNOSIS — Z90.722 HISTORY OF TOTAL HYSTERECTOMY WITH BILATERAL SALPINGO-OOPHORECTOMY (BSO): ICD-10-CM

## 2023-01-26 DIAGNOSIS — M85.89 OSTEOPENIA OF MULTIPLE SITES: ICD-10-CM

## 2023-02-15 DIAGNOSIS — Z91.013 SHELLFISH ALLERGY: ICD-10-CM

## 2023-02-16 RX ORDER — EPINEPHRINE 0.3 MG/.3ML
INJECTION SUBCUTANEOUS
Qty: 2 EACH | Refills: 1 | Status: SHIPPED | OUTPATIENT
Start: 2023-02-16

## 2023-03-28 ENCOUNTER — HOSPITAL ENCOUNTER (OUTPATIENT)
Dept: RADIOLOGY | Facility: HOSPITAL | Age: 63
Discharge: HOME | End: 2023-03-28
Attending: OBSTETRICS & GYNECOLOGY

## 2023-03-28 DIAGNOSIS — R92.2 DENSE BREAST TISSUE ON MAMMOGRAM: ICD-10-CM

## 2023-03-28 DIAGNOSIS — Z12.31 ENCOUNTER FOR SCREENING MAMMOGRAM FOR BREAST CANCER: ICD-10-CM

## 2023-07-06 ENCOUNTER — OFFICE VISIT (OUTPATIENT)
Dept: FAMILY MEDICINE CLINIC | Facility: CLINIC | Age: 63
End: 2023-07-06
Payer: COMMERCIAL

## 2023-07-06 VITALS
BODY MASS INDEX: 28.19 KG/M2 | WEIGHT: 175.4 LBS | HEIGHT: 66 IN | TEMPERATURE: 99 F | DIASTOLIC BLOOD PRESSURE: 60 MMHG | RESPIRATION RATE: 18 BRPM | HEART RATE: 70 BPM | SYSTOLIC BLOOD PRESSURE: 124 MMHG | OXYGEN SATURATION: 96 %

## 2023-07-06 DIAGNOSIS — M85.80 LOW BONE MASS: ICD-10-CM

## 2023-07-06 DIAGNOSIS — E03.9 PRIMARY HYPOTHYROIDISM: ICD-10-CM

## 2023-07-06 DIAGNOSIS — Z13.6 SCREENING FOR CARDIOVASCULAR CONDITION: ICD-10-CM

## 2023-07-06 DIAGNOSIS — G89.29 CHRONIC PAIN OF RIGHT ANKLE: Primary | ICD-10-CM

## 2023-07-06 DIAGNOSIS — M25.571 CHRONIC PAIN OF RIGHT ANKLE: Primary | ICD-10-CM

## 2023-07-06 DIAGNOSIS — Z13.0 SCREENING FOR DEFICIENCY ANEMIA: ICD-10-CM

## 2023-07-06 PROCEDURE — 99214 OFFICE O/P EST MOD 30 MIN: CPT | Performed by: FAMILY MEDICINE

## 2023-07-06 PROCEDURE — 3725F SCREEN DEPRESSION PERFORMED: CPT | Performed by: FAMILY MEDICINE

## 2023-07-06 NOTE — PROGRESS NOTES
Name: Mare Shea      : 1960      MRN: [id number]  Encounter Provider: Mireya Alicea DO  Encounter Date: 2023   Encounter department: [facility]     1. Chronic pain of right ankle  Comments:  New  continue advil and tylenol   ice recommended  Orders:  -     Ambulatory referral to Physical Therapy; Future    2. Primary hypothyroidism  Assessment & Plan:  Stable  Continue levothyroxine 7 5 mcg daily    Orders:  -     TSH, 3rd generation; Future  -     T4, free; Future  -     TSH, 3rd generation  -     T4, free    3. Low bone mass  Assessment & Plan:  Stable  We will check vitamin D level    Orders:  -     Vitamin D 25 hydroxy; Future  -     Vitamin D 25 hydroxy    4. Screening for cardiovascular condition  -     Comprehensive metabolic panel; Future  -     Lipid Panel with Direct LDL reflex; Future  -     Comprehensive metabolic panel  -     Lipid Panel with Direct LDL reflex    5. Screening for deficiency anemia  -     CBC; Future  -     CBC        Return for Annual physical.      Subjective      Pain in her right ankle on the medial aspect. advil and tylenol help the pain  It has been flaring intermittently for the past year and this flare has lasted for a week    No trauma    Review of Systems    Current Outpatient Medications on File Prior to Visit   Medication Sig   • b complex vitamins tablet Take 1 tablet by mouth daily. • betamethasone dipropionate (DIPROSONE) 0.05 % cream Apply topically 2 (two) times a day (Patient taking differently: Apply topically 2 (two) times a day Per patient taking as needed)   • calcium carbonate (OS-MARVA) 600 MG tablet Take 600 mg by mouth daily. • EPINEPHrine (EPIPEN) 0.3 mg/0.3 mL SOAJ INJECT CONTENTS OF 1 PEN  INTRAMUSCULARLY 1 TIME FOR  1 DOSE , SEEK MEDICAL  ATTENTION AFTER USE   • latanoprost (XALATAN) 0.005 % ophthalmic solution Administer 1 drop to both eyes daily at bedtime.      • levothyroxine 75 mcg tablet Take 1 tablet (75 mcg total) by mouth daily   • loratadine (CLARITIN) 10 mg tablet Take 10 mg by mouth daily. • Magnesium 400 MG TABS    • Multiple Vitamin (MULTIVITAMIN) tablet Take 1 tablet by mouth daily. • Turmeric 500 MG CAPS    • venlafaxine (EFFEXOR-XR) 37.5 mg 24 hr capsule Take 1 capsule (37.5 mg total) by mouth daily   • Vitamin D, Cholecalciferol, 1000 UNITS TABS Take 1,000 Units by mouth daily. Objective     /60   Pulse 70   Temp 99 °F (37.2 °C)   Resp 18   Ht 5' 6" (1.676 m)   Wt 79.6 kg (175 lb 6.4 oz)   SpO2 96%   BMI 28.31 kg/m²     Physical Exam  Vitals and nursing note reviewed. Constitutional:       Appearance: She is well-developed. HENT:      Head: Normocephalic and atraumatic. Right Ear: Tympanic membrane and external ear normal.      Left Ear: Tympanic membrane and external ear normal.   Cardiovascular:      Rate and Rhythm: Normal rate and regular rhythm. Heart sounds: Normal heart sounds. No murmur heard. No friction rub. Pulmonary:      Effort: Pulmonary effort is normal. No respiratory distress. Breath sounds: Normal breath sounds. No wheezing or rales. Musculoskeletal:         General: Tenderness ( Right ankle posterior of medial malleolus) present. Right lower leg: No edema. Left lower leg: No edema.        Goyo Carter DO

## 2023-07-11 DIAGNOSIS — F41.9 ANXIETY: ICD-10-CM

## 2023-07-11 RX ORDER — VENLAFAXINE HYDROCHLORIDE 37.5 MG/1
CAPSULE, EXTENDED RELEASE ORAL
Qty: 90 CAPSULE | Refills: 3 | Status: SHIPPED | OUTPATIENT
Start: 2023-07-11

## 2023-07-14 ENCOUNTER — HOSPITAL ENCOUNTER (OUTPATIENT)
Dept: NON INVASIVE DIAGNOSTICS | Facility: HOSPITAL | Age: 63
Discharge: HOME | End: 2023-07-14
Payer: COMMERCIAL

## 2023-07-14 VITALS
WEIGHT: 175.49 LBS | SYSTOLIC BLOOD PRESSURE: 130 MMHG | HEIGHT: 66 IN | DIASTOLIC BLOOD PRESSURE: 79 MMHG | HEART RATE: 75 BPM | BODY MASS INDEX: 28.2 KG/M2

## 2023-07-14 DIAGNOSIS — R01.1 MURMUR: ICD-10-CM

## 2023-07-14 LAB
AORTIC ROOT: 3.6 CM
APICAL FOUR CHAMBER EJECTION FRACTION: 70 %
E WAVE DECELERATION TIME: 201 MS
FRACTIONAL SHORTENING: 27 (ref 28–44)
INTERVENTRICULAR SEPTUM IN DIASTOLE (PARASTERNAL SHORT AXIS VIEW): 1 CM
INTERVENTRICULAR SEPTUM: 1 CM (ref 0.6–1.1)
LAAS-AP2: 25.3 CM2
LAAS-AP4: 15.4 CM2
LEFT ATRIUM SIZE: 2.6 CM
LEFT ATRIUM VOLUME (MOD BIPLANE): 65 ML
LEFT INTERNAL DIMENSION IN SYSTOLE: 3 CM (ref 2.1–4)
LEFT VENTRICULAR INTERNAL DIMENSION IN DIASTOLE: 4.1 CM (ref 3.5–6)
LEFT VENTRICULAR POSTERIOR WALL IN END DIASTOLE: 1 CM
LEFT VENTRICULAR STROKE VOLUME: 41 ML
LVSV (TEICH): 41 ML
MV E'TISSUE VEL-LAT: 8 CM/S
MV E'TISSUE VEL-SEP: 10 CM/S
MV PEAK A VEL: 0.63 M/S
MV PEAK E VEL: 59 CM/S
MV STENOSIS PRESSURE HALF TIME: 58 MS
MV VALVE AREA P 1/2 METHOD: 3.79
RIGHT ATRIUM AREA SYSTOLE A4C: 12.3 CM2
RIGHT VENTRICLE ID DIMENSION: 2.8 CM
SL CV LEFT ATRIUM LENGTH A2C: 5.6 CM
SL CV PED ECHO LEFT VENTRICLE DIASTOLIC VOLUME (MOD BIPLANE) 2D: 76 ML
SL CV PED ECHO LEFT VENTRICLE SYSTOLIC VOLUME (MOD BIPLANE) 2D: 35 ML
TR MAX PG: 25 MMHG
TR PEAK VELOCITY: 2.5 M/S
TRICUSPID ANNULAR PLANE SYSTOLIC EXCURSION: 2.3 CM
TRICUSPID VALVE PEAK REGURGITATION VELOCITY: 2.5 M/S

## 2023-07-14 PROCEDURE — 93306 TTE W/DOPPLER COMPLETE: CPT

## 2023-07-14 PROCEDURE — 93306 TTE W/DOPPLER COMPLETE: CPT | Performed by: INTERNAL MEDICINE

## 2023-07-20 ENCOUNTER — EVALUATION (OUTPATIENT)
Dept: PHYSICAL THERAPY | Facility: CLINIC | Age: 63
End: 2023-07-20
Payer: COMMERCIAL

## 2023-07-20 DIAGNOSIS — M25.571 CHRONIC PAIN OF RIGHT ANKLE: Primary | ICD-10-CM

## 2023-07-20 DIAGNOSIS — G89.29 CHRONIC PAIN OF RIGHT ANKLE: Primary | ICD-10-CM

## 2023-07-20 PROCEDURE — 97161 PT EVAL LOW COMPLEX 20 MIN: CPT | Performed by: PHYSICAL THERAPIST

## 2023-07-20 PROCEDURE — 97110 THERAPEUTIC EXERCISES: CPT | Performed by: PHYSICAL THERAPIST

## 2023-07-20 NOTE — PROGRESS NOTES
PT Evaluation     Today's date: 2023  Patient name: Elaine Curran  : 1960  MRN: [id number]  Referring provider: Ramon Rodrigues DO  Dx:   Encounter Diagnosis     ICD-10-CM    1. Chronic pain of right ankle  M25.571     G89.29           Start Time: 1015  Stop Time: 1050  Total time in clinic (min): 35 minutes    Assessment  Assessment details: Elaine Cruran is a 70 y.o. female who presents with complaints of Chronic pain of right ankle  (primary encounter diagnosis). No further referral appears necessary at this time based upon examination results. Patient is presenting with slight decrease in strength with navicular drop (high arches present) leading to limitations with walking and stair navigation at times. Prognosis is good given HEP compliance and PT 1-2x/wk. Positive prognostic indicators include positive attitude toward recovery. Please contact me if you have any questions or recommendations. Thank you for the opportunity to share in Smita's care. Impairments: abnormal gait, activity intolerance, impaired physical strength and lacks appropriate home exercise program    Symptom irritability: lowUnderstanding of Dx/Px/POC: good   Prognosis: good    Plan  Patient would benefit from: skilled physical therapy  Planned therapy interventions: joint mobilization, manual therapy, patient education, postural training, strengthening, therapeutic activities, therapeutic exercise, home exercise program, neuromuscular re-education, flexibility, functional ROM exercises, balance and stretching  Frequency: 1-2x. Duration in weeks: 8  Treatment plan discussed with: patient        Subjective Evaluation    History of Present Illness  Mechanism of injury: Patient reports that her ankle will flare up for a few days and then improve but this most recent episode has been on/off for about 1 year. Occasionally will wake up with pain. 2 advil and tylenol will help as well as ice.  Symptoms described as aching in nature located medial malleolus region. Occasional numbness reported. Walking barefoot can increase her discomfort. Occasional step to gait pattern descending stairs when foot is flared. Recurrent probem    Quality of life: good    Patient Goals  Patient goals for therapy: decreased pain, increased motion, increased strength and independence with ADLs/IADLs  Patient goal: strengthen and reduce irritation  Pain  Current pain ratin  At worst pain ratin  Quality: dull ache  Relieving factors: ice and medications    Social Support  Steps to enter house: yes  Stairs in house: yes     Treatments  Previous treatment: physical therapy         Short Term:  1. Pt will report decreased levels of pain by at least 2 subjective ratings in 4 weeks  2. Pt will demonstrate improved ROM by at least 5 degrees in 4 weeks  3. Pt will demonstrate improved strength by 1/2 grade in 4 weeks. Long Term:   1. Pt will be independent in their HEP in 8 weeks  2. Pt will be pain free with ADL's in 8 weeks. 3. Pt will demonstrate 5/5 MMT in 8 weeks. 4. Pt will be able to perform reciprocal stair navigation 100% of the time in 8 weeks. Objective    GAIT: slight pes planus RLE  Squat assess: slight tibial ER RLE  SLS: RLE: navicular drop < 10 sec., LLE: 10 sec. MMT    Hip         R          L   Flex. 5 5   Extn. 4 4                 MMT    Knee      R          L   Flex. 5 5   Extn. 5 5              MMT          AROM          PROM    Ankle         R          L          R         L          R         L   PF 4+ 5 45 50     DF. 5 5       DF bent knee 5 5 8 10     EHL 5 5       Inv. 5 5 WNL WNL     Ever. 5 5 WNL WNL     Post Tib.  4 5       Foot Int. 4 5         Palpation: no TTP  Ankle:   anterior draw =  -     talar tilt= -       Posterior draw= -    inversion stress=  -   eversion stress=  -     joint findings= slight hypomobility TCJ        Insurance:  AMA/CMS Eval/ Re-eval POC expires [place] #/ Referral # Total Start date  Expiration date Extension  Visit limitation? PT only or  PT+OT? Co-Insurance   CMS 7.20.23 9.13.23  No auth                            Precautions: none  Patient provided verbal consent to treatment plan and recommended interventions.     Manuals 7.20            visit 1                                                   Neuro Re-Ed                                                                                                        Ther Ex             Arch lift -HEP 2*10, 5"            Calf raise -HEP 3*10            Single leg stance - HEP 2*30''            Rep DF on step 10x                                                                Ther Activity

## 2023-09-09 LAB
25(OH)D3+25(OH)D2 SERPL-MCNC: 38.9 NG/ML (ref 30–100)
ALBUMIN SERPL-MCNC: 4.7 G/DL (ref 3.9–4.9)
ALBUMIN/GLOB SERPL: 1.9 {RATIO} (ref 1.2–2.2)
ALP SERPL-CCNC: 96 IU/L (ref 44–121)
ALT SERPL-CCNC: 21 IU/L (ref 0–32)
AST SERPL-CCNC: 20 IU/L (ref 0–40)
BILIRUB SERPL-MCNC: 0.5 MG/DL (ref 0–1.2)
BUN SERPL-MCNC: 12 MG/DL (ref 8–27)
BUN/CREAT SERPL: 16 (ref 12–28)
CALCIUM SERPL-MCNC: 9.6 MG/DL (ref 8.7–10.3)
CHLORIDE SERPL-SCNC: 100 MMOL/L (ref 96–106)
CHOLEST SERPL-MCNC: 210 MG/DL (ref 100–199)
CO2 SERPL-SCNC: 27 MMOL/L (ref 20–29)
CREAT SERPL-MCNC: 0.73 MG/DL (ref 0.57–1)
EGFR: 91 ML/MIN/1.73
ERYTHROCYTE [DISTWIDTH] IN BLOOD BY AUTOMATED COUNT: 11.9 % (ref 11.7–15.4)
GLOBULIN SER-MCNC: 2.5 G/DL (ref 1.5–4.5)
GLUCOSE SERPL-MCNC: 99 MG/DL (ref 70–99)
HCT VFR BLD AUTO: 39.7 % (ref 34–46.6)
HDLC SERPL-MCNC: 88 MG/DL
HGB BLD-MCNC: 13.4 G/DL (ref 11.1–15.9)
LDLC SERPL CALC-MCNC: 106 MG/DL (ref 0–99)
LDLC/HDLC SERPL: 1.2 RATIO (ref 0–3.2)
MCH RBC QN AUTO: 32.6 PG (ref 26.6–33)
MCHC RBC AUTO-ENTMCNC: 33.8 G/DL (ref 31.5–35.7)
MCV RBC AUTO: 97 FL (ref 79–97)
MICRODELETION SYND BLD/T FISH: NORMAL
PLATELET # BLD AUTO: 264 X10E3/UL (ref 150–450)
POTASSIUM SERPL-SCNC: 4.5 MMOL/L (ref 3.5–5.2)
PROT SERPL-MCNC: 7.2 G/DL (ref 6–8.5)
RBC # BLD AUTO: 4.11 X10E6/UL (ref 3.77–5.28)
SL AMB VLDL CHOLESTEROL CALC: 16 MG/DL (ref 5–40)
SODIUM SERPL-SCNC: 139 MMOL/L (ref 134–144)
T4 FREE SERPL-MCNC: 1.54 NG/DL (ref 0.82–1.77)
TRIGL SERPL-MCNC: 94 MG/DL (ref 0–149)
TSH SERPL DL<=0.005 MIU/L-ACNC: 2.52 UIU/ML (ref 0.45–4.5)
WBC # BLD AUTO: 3.8 X10E3/UL (ref 3.4–10.8)

## 2023-09-14 ENCOUNTER — RA CDI HCC (OUTPATIENT)
Dept: OTHER | Facility: HOSPITAL | Age: 63
End: 2023-09-14

## 2023-09-14 NOTE — PROGRESS NOTES
[address] [facility] coding opportunities       Chart reviewed, no opportunity found: [address]        Patients Insurance     Medicare Insurance: [employer] Advantage

## 2023-09-18 ENCOUNTER — OFFICE VISIT (OUTPATIENT)
Dept: FAMILY MEDICINE CLINIC | Facility: CLINIC | Age: 63
End: 2023-09-18
Payer: COMMERCIAL

## 2023-09-18 VITALS
SYSTOLIC BLOOD PRESSURE: 120 MMHG | OXYGEN SATURATION: 98 % | DIASTOLIC BLOOD PRESSURE: 80 MMHG | RESPIRATION RATE: 18 BRPM | HEIGHT: 65 IN | BODY MASS INDEX: 29.49 KG/M2 | TEMPERATURE: 98 F | HEART RATE: 70 BPM | WEIGHT: 177 LBS

## 2023-09-18 DIAGNOSIS — Z23 NEED FOR VACCINATION: Primary | ICD-10-CM

## 2023-09-18 DIAGNOSIS — E03.9 PRIMARY HYPOTHYROIDISM: ICD-10-CM

## 2023-09-18 DIAGNOSIS — Z00.00 ENCOUNTER FOR INITIAL PREVENTIVE PHYSICAL EXAMINATION COVERED BY MEDICARE: ICD-10-CM

## 2023-09-18 DIAGNOSIS — Z91.013 SHELLFISH ALLERGY: ICD-10-CM

## 2023-09-18 PROBLEM — I34.0 NONRHEUMATIC MITRAL VALVE REGURGITATION: Status: ACTIVE | Noted: 2023-09-18

## 2023-09-18 PROCEDURE — 90677 PCV20 VACCINE IM: CPT

## 2023-09-18 PROCEDURE — 90471 IMMUNIZATION ADMIN: CPT

## 2023-09-18 PROCEDURE — G0402 INITIAL PREVENTIVE EXAM: HCPCS | Performed by: FAMILY MEDICINE

## 2023-09-18 RX ORDER — EPINEPHRINE 0.3 MG/.3ML
0.3 INJECTION SUBCUTANEOUS ONCE AS NEEDED
Qty: 2 EACH | Refills: 1 | Status: SHIPPED | OUTPATIENT
Start: 2023-09-18

## 2023-09-18 NOTE — PROGRESS NOTES
Assessment and Plan:     Problem List Items Addressed This Visit     Primary hypothyroidism    Relevant Orders    TSH, 3rd generation    T4, free   Other Visit Diagnoses     Need for vaccination    -  Primary    Relevant Orders    Pneumococcal Conjugate Vaccine 20-valent (Pcv20) (Completed)    Encounter for initial preventive physical examination covered by Medicare        Shellfish allergy        Relevant Medications    EPINEPHrine (EPIPEN) 0.3 mg/0.3 mL SOAJ      Return in about 6 months (around 3/18/2024) for Next scheduled follow up. Preventive health issues were discussed with patient, and age appropriate screening tests were ordered as noted in patient's After Visit Summary. Personalized health advice and appropriate referrals for health education or preventive services given if needed, as noted in patient's After Visit Summary. History of Present Illness:     Patient presents for a Medicare Wellness Visit    She is feeling well  She had a good summer. She was in [place] May last week      Patient Care Team:  Merlyn Anderson DO as PCP - MD Lanie Reynoso MD     Review of Systems:     Review of Systems   Constitutional: Negative. Respiratory: Negative. Cardiovascular: Negative.          Problem List:     Patient Active Problem List   Diagnosis   • Adnexal mass   • Anxiety   • Primary hypothyroidism   • ALYCIA (obstructive sleep apnea)   • Acquired deformity of foot   • Valgus deformity of great toe   • Congenital pes planus of right foot   • Congenital pes planus of left foot   • Goiter   • Allergic rhinitis   • Elevated ALT measurement   • Dense breast tissue on mammogram   • Atrophic vaginitis   • History of total hysterectomy with bilateral salpingo-oophorectomy (BSO)   • Murmur   • Low bone mass   • Encounter for annual routine gynecological examination   • Encounter for screening mammogram for breast cancer   • Nonrheumatic mitral valve regurgitation      Past Medical and Surgical History:     Past Medical History:   Diagnosis Date   • Arthritis    • Complex cyst of right ovary    • Disease of thyroid gland    • Glaucoma    • Hyperthyroidism    • Uterus fibroma      Past Surgical History:   Procedure Laterality Date   •  SECTION      X2   • COLONOSCOPY     • HYSTERECTOMY     • LAPAROTOMY N/A 2016    Procedure: LAPAROTOMY EXPLORATORY; OVARIAN CANCER STAGING ;  Surgeon: Dirk Mcdaniel MD;  Location: [facility];  Service:    • OOPHORECTOMY     • NV HEMORRHOIDECTOMY INT & Vena Phenes 2/> COLUMN/MELCHOR N/A 2019    Procedure: HEMORRHOIDECTOMY EXCISION;  Surgeon: Marion Alejo MD;  Location: [facility];  Service: Colorectal   • NV LAPS TOTAL HYSTERECT 250 GM/< W/RMVL TUBE/OVARY N/A 2016    Procedure: HYSTERECTOMY LAPAROSCOPIC TOTAL (LTH),cystoscopy, prostoctopy;  Surgeon: Dirk Mcdaniel MD;  Location: [facility];  Service: Gynecology Oncology   • TUBAL LIGATION        Family History:     Family History   Problem Relation Age of Onset   • Heart disease Mother    • Diabetes Father    • No Known Problems Sister    • Seizures Brother    • Heart disease Brother    • No Known Problems Daughter    • No Known Problems Maternal Grandmother    • No Known Problems Maternal Grandfather    • No Known Problems Paternal Grandmother    • No Known Problems Paternal Grandfather    • Cancer Brother         lump on neck   • No Known Problems Daughter    • No Known Problems Maternal Aunt    • No Known Problems Maternal Uncle    • No Known Problems Paternal Aunt    • No Known Problems Paternal Uncle    • Breast cancer additional onset Neg Hx    • Mental illness Neg Hx       Social History:     Social History     Socioeconomic History   • Marital status: /Civil Union     Spouse name: None   • Number of children: 2   • Years of education: None   • Highest education level: None   Occupational History     Comment: Full-time employment   Tobacco Use   • Smoking status: Never • Smokeless tobacco: Never   Vaping Use   • Vaping Use: Never used   Substance and Sexual Activity   • Alcohol use: Yes     Alcohol/week: 2.0 standard drinks of alcohol     Types: 2 Standard drinks or equivalent per week     Comment: social   • Drug use: Yes     Types: Marijuana     Comment: eatables   • Sexual activity: Yes     Partners: Male     Birth control/protection: Female Sterilization   Other Topics Concern   • None   Social History Narrative    Daily caffeine consumption, 2-3 servings a day    Exercises 1-2 times per week     Social Determinants of Health     Financial Resource Strain: Low Risk  (9/15/2023)    Overall Financial Resource Strain (CARDIA)    • Difficulty of Paying Living Expenses: Not very hard   Food Insecurity: Not on file   Transportation Needs: No Transportation Needs (9/15/2023)    PRAPARE - Transportation    • Lack of Transportation (Medical): No    • Lack of Transportation (Non-Medical): No   Physical Activity: Not on file   Stress: Not on file   Social Connections: Not on file   Intimate Partner Violence: Not on file   Housing Stability: Not on file      Medications and Allergies:     Current Outpatient Medications   Medication Sig Dispense Refill   • b complex vitamins tablet Take 1 tablet by mouth daily. • betamethasone dipropionate (DIPROSONE) 0.05 % cream Apply topically 2 (two) times a day (Patient taking differently: Apply topically 2 (two) times a day Per patient taking as needed) 30 g 0   • calcium carbonate (OS-MARVA) 600 MG tablet Take 600 mg by mouth daily. • EPINEPHrine (EPIPEN) 0.3 mg/0.3 mL SOAJ Inject 0.3 mL (0.3 mg total) into a muscle once as needed for anaphylaxis 2 each 1   • latanoprost (XALATAN) 0.005 % ophthalmic solution Administer 1 drop to both eyes daily at bedtime. • levothyroxine 75 mcg tablet Take 1 tablet (75 mcg total) by mouth daily 90 tablet 3   • loratadine (CLARITIN) 10 mg tablet Take 10 mg by mouth daily.      • Magnesium 400 MG TABS • Multiple Vitamin (MULTIVITAMIN) tablet Take 1 tablet by mouth daily. • Turmeric 500 MG CAPS      • venlafaxine (EFFEXOR-XR) 37.5 mg 24 hr capsule TAKE 1 CAPSULE BY MOUTH  DAILY 90 capsule 3   • Vitamin D, Cholecalciferol, 1000 UNITS TABS Take 1,000 Units by mouth daily. No current facility-administered medications for this visit. Allergies   Allergen Reactions   • Shellfish-Derived Products - Food Allergy Anaphylaxis and Itching      Immunizations:     Immunization History   Administered Date(s) Administered   • COVID-19 MODERNA VACC 0.25 ML IM BOOSTER 12/14/2021   • COVID-19 MODERNA VACC 0.5 ML IM 04/06/2021, 05/04/2021   • INFLUENZA 10/11/2021, 10/19/2022   • Influenza, injectable, quadrivalent, preservative free 0.5 mL 10/17/2020   • Influenza, recombinant, quadrivalent,injectable, preservative free 11/20/2018   • Pneumococcal Conjugate Vaccine 20-valent (Pcv20), Polysace 09/18/2023   • Tdap 09/06/2007, 05/23/2017   • Zoster Vaccine Recombinant 06/17/2021, 08/18/2021      Health Maintenance:         Topic Date Due   • HIV Screening  Never done   • Breast Cancer Screening: Mammogram  03/28/2024   • Colorectal Cancer Screening  01/28/2026   • Hepatitis C Screening  Completed         Topic Date Due   • COVID-19 Vaccine (4 - Moderna series) 02/08/2022   • Influenza Vaccine (1) 09/01/2023      Medicare Screening Tests and Risk Assessments:     Jillian Valencia is here for her Welcome to Medicare visit. Health Risk Assessment:   Patient rates overall health as very good. Patient feels that their physical health rating is same. Patient is very satisfied with their life. Eyesight was rated as same. Hearing was rated as same. Patient feels that their emotional and mental health rating is same. Patients states they are never, rarely angry. Patient states they are never, rarely unusually tired/fatigued. Pain experienced in the last 7 days has been some. Patient's pain rating has been 5/10.  Patient states that she has experienced weight loss or gain in last 6 months. Depression Screening:   PHQ-2 Score: 0      Fall Risk Screening: In the past year, patient has experienced: no history of falling in past year      Urinary Incontinence Screening:   Patient has not leaked urine accidently in the last six months. Home Safety:  Patient does not have trouble with stairs inside or outside of their home. Patient has working smoke alarms and has working carbon monoxide detector. Home safety hazards include: none. Nutrition:   Current diet is Regular and Limited junk food. Medications:   Patient is currently taking over-the-counter supplements. OTC medications include: Multi vitamin & misc vitamins. Patient is able to manage medications. Activities of Daily Living (ADLs)/Instrumental Activities of Daily Living (IADLs):   Walk and transfer into and out of bed and chair?: Yes  Dress and groom yourself?: Yes    Bathe or shower yourself?: Yes    Feed yourself?  Yes  Do your laundry/housekeeping?: Yes  Manage your money, pay your bills and track your expenses?: Yes  Make your own meals?: Yes    Do your own shopping?: Yes    Previous Hospitalizations:   Any hospitalizations or ED visits within the last 12 months?: No      Advance Care Planning:   Living will: No    Durable POA for healthcare: No    Advanced directive: No    Advanced directive counseling given: Yes    End of Life Decisions reviewed with patient: Yes    Provider agrees with end of life decisions: Yes      Cognitive Screening:   Provider or family/friend/caregiver concerned regarding cognition?: No    PREVENTIVE SCREENINGS      Cardiovascular Screening:    General: Screening Current      Diabetes Screening:     General: Screening Current      Colorectal Cancer Screening:     General: Screening Current      Breast Cancer Screening:     General: Screening Current      Cervical Cancer Screening:    General: Screening Not Indicated      Osteoporosis Screening: General: Screening Current      Abdominal Aortic Aneurysm (AAA) Screening:        General: Screening Not Indicated      Lung Cancer Screening:     General: Screening Not Indicated      Hepatitis C Screening:    General: Screening Current    Screening, Brief Intervention, and Referral to Treatment (SBIRT)    Screening  Typical number of drinks in a day: 0  Typical number of drinks in a week: 2  Interpretation: Low risk drinking behavior. AUDIT-C Screenin) How often did you have a drink containing alcohol in the past year? 2 to 4 times a month  2) How many drinks did you have on a typical day when you were drinking in the past year? 1 to 2  3) How often did you have 6 or more drinks on one occasion in the past year? never    AUDIT-C Score: 2  Interpretation: Score 0-2 (female): Negative screen for alcohol misuse    Single Item Drug Screening:  How often have you used an illegal drug (including marijuana) or a prescription medication for non-medical reasons in the past year? never    Single Item Drug Screen Score: 0  Interpretation: Negative screen for possible drug use disorder    Brief Intervention  Alcohol & drug use screenings were reviewed. No concerns regarding substance use disorder identified. Vision Screening    Right eye Left eye Both eyes   Without correction      With correction 20/25 20/25 20/25        Physical Exam:     /80   Pulse 70   Temp 98 °F (36.7 °C)   Resp 18   Ht 5' 5" (1.651 m)   Wt 80.3 kg (177 lb)   SpO2 98%   BMI 29.45 kg/m²     Physical Exam  Vitals and nursing note reviewed. Constitutional:       Appearance: She is well-developed. HENT:      Head: Normocephalic and atraumatic. Right Ear: External ear normal.      Left Ear: External ear normal.      Nose: Nose normal.   Cardiovascular:      Rate and Rhythm: Normal rate and regular rhythm. Heart sounds: Normal heart sounds. No murmur heard. No friction rub.    Pulmonary:      Effort: No respiratory distress. Breath sounds: Normal breath sounds. No wheezing or rales. Musculoskeletal:      Right lower leg: No edema. Left lower leg: No edema. Neurological:      Mental Status: She is oriented to person, place, and time. Cranial Nerves: No cranial nerve deficit.           Darlin Curran, DO

## 2023-09-18 NOTE — PATIENT INSTRUCTIONS
Medicare Preventive Visit Patient Instructions  Thank you for completing your Welcome to Medicare Visit or Medicare Annual Wellness Visit today. Your next wellness visit will be due in one year (9/18/2024). The screening/preventive services that you may require over the next 5-10 years are detailed below. Some tests may not apply to you based off risk factors and/or age. Screening tests ordered at today's visit but not completed yet may show as past due. Also, please note that scanned in results may not display below. Preventive Screenings:  Service Recommendations Previous Testing/Comments   Colorectal Cancer Screening  * Colonoscopy    * Fecal Occult Blood Test (FOBT)/Fecal Immunochemical Test (FIT)  * Fecal DNA/Cologuard Test  * Flexible Sigmoidoscopy Age: 43-73 years old   Colonoscopy: every 10 years (may be performed more frequently if at higher risk)  OR  FOBT/FIT: every 1 year  OR  Cologuard: every 3 years  OR  Sigmoidoscopy: every 5 years  Screening may be recommended earlier than age 39 if at higher risk for colorectal cancer. Also, an individualized decision between you and your healthcare provider will decide whether screening between the ages of 77-80 would be appropriate. Colonoscopy: 01/28/2021  FOBT/FIT: Not on file  Cologuard: Not on file  Sigmoidoscopy: Not on file    Screening Current     Breast Cancer Screening Age: 36 years old  Frequency: every 1-2 years  Not required if history of left and right mastectomy Mammogram: 03/28/2023    Screening Current   Cervical Cancer Screening Between the ages of 21-29, pap smear recommended once every 3 years. Between the ages of 32-69, can perform pap smear with HPV co-testing every 5 years.    Recommendations may differ for women with a history of total hysterectomy, cervical cancer, or abnormal pap smears in past. Pap Smear: 01/12/2021    Screening Not Indicated   Hepatitis C Screening Once for adults born between 1945 and 1965  More frequently in patients at high risk for Hepatitis C Hep C Antibody: 12/12/2018    Screening Current   Diabetes Screening 1-2 times per year if you're at risk for diabetes or have pre-diabetes Fasting glucose: No results in last 5 years (No results in last 5 years)  A1C: No results in last 5 years (No results in last 5 years)  Screening Current   Cholesterol Screening Once every 5 years if you don't have a lipid disorder. May order more often based on risk factors. Lipid panel: 09/08/2023    Screening Current     Other Preventive Screenings Covered by Medicare:  1. Abdominal Aortic Aneurysm (AAA) Screening: covered once if your at risk. You're considered to be at risk if you have a family history of AAA. 2. Lung Cancer Screening: covers low dose CT scan once per year if you meet all of the following conditions: (1) Age 48-67; (2) No signs or symptoms of lung cancer; (3) Current smoker or have quit smoking within the last 15 years; (4) You have a tobacco smoking history of at least 20 pack years (packs per day multiplied by number of years you smoked); (5) You get a written order from a healthcare provider. 3. Glaucoma Screening: covered annually if you're considered high risk: (1) You have diabetes OR (2) Family history of glaucoma OR (3)  aged 48 and older OR (3)  American aged 72 and older  3. Osteoporosis Screening: covered every 2 years if you meet one of the following conditions: (1) You're estrogen deficient and at risk for osteoporosis based off medical history and other findings; (2) Have a vertebral abnormality; (3) On glucocorticoid therapy for more than 3 months; (4) Have primary hyperparathyroidism; (5) On osteoporosis medications and need to assess response to drug therapy. · Last bone density test (DXA Scan): 07/28/2022.  5. HIV Screening: covered annually if you're between the age of 15-65.  Also covered annually if you are younger than 13 and older than 72 with risk factors for HIV infection. For pregnant patients, it is covered up to 3 times per pregnancy. Immunizations:  Immunization Recommendations   Influenza Vaccine Annual influenza vaccination during flu season is recommended for all persons aged >= 6 months who do not have contraindications   Pneumococcal Vaccine   * Pneumococcal conjugate vaccine = PCV13 (Prevnar 13), PCV15 (Vaxneuvance), PCV20 (Prevnar 20)  * Pneumococcal polysaccharide vaccine = PPSV23 (Pneumovax) Adults 20-63 years old: 1-3 doses may be recommended based on certain risk factors  Adults 72 years old: 1-2 doses may be recommended based off what pneumonia vaccine you previously received   Hepatitis B Vaccine 3 dose series if at intermediate or high risk (ex: diabetes, end stage renal disease, liver disease)   Tetanus (Td) Vaccine - COST NOT COVERED BY MEDICARE PART B Following completion of primary series, a booster dose should be given every 10 years to maintain immunity against tetanus. Td may also be given as tetanus wound prophylaxis. Tdap Vaccine - COST NOT COVERED BY MEDICARE PART B Recommended at least once for all adults. For pregnant patients, recommended with each pregnancy. Shingles Vaccine (Shingrix) - COST NOT COVERED BY MEDICARE PART B  2 shot series recommended in those aged 48 and above     Health Maintenance Due:      Topic Date Due   • HIV Screening  Never done   • Breast Cancer Screening: Mammogram  03/28/2024   • Colorectal Cancer Screening  01/28/2026   • Hepatitis C Screening  Completed     Immunizations Due:      Topic Date Due   • Pneumococcal Vaccine: 65+ Years (1 - PCV) Never done   • COVID-19 Vaccine (4 - Moderna series) 02/08/2022   • Influenza Vaccine (1) 09/01/2023     Advance Directives   What are advance directives? Advance directives are legal documents that state your wishes and plans for medical care. These plans are made ahead of time in case you lose your ability to make decisions for yourself.  Advance directives can apply to any medical decision, such as the treatments you want, and if you want to donate organs. What are the types of advance directives? There are many types of advance directives, and each state has rules about how to use them. You may choose a combination of any of the following:  · Living will: This is a written record of the treatment you want. You can also choose which treatments you do not want, which to limit, and which to stop at a certain time. This includes surgery, medicine, IV fluid, and tube feedings. · Durable power of  for healthcare [facility]): This is a written record that states who you want to make healthcare choices for you when you are unable to make them for yourself. This person, called a proxy, is usually a family member or a friend. You may choose more than 1 proxy. · Do not resuscitate (DNR) order:  A DNR order is used in case your heart stops beating or you stop breathing. It is a request not to have certain forms of treatment, such as CPR. A DNR order may be included in other types of advance directives. · Medical directive: This covers the care that you want if you are in a coma, near death, or unable to make decisions for yourself. You can list the treatments you want for each condition. Treatment may include pain medicine, surgery, blood transfusions, dialysis, IV or tube feedings, and a ventilator (breathing machine). · Values history: This document has questions about your views, beliefs, and how you feel and think about life. This information can help others choose the care that you would choose. Why are advance directives important? An advance directive helps you control your care. Although spoken wishes may be used, it is better to have your wishes written down. Spoken wishes can be misunderstood, or not followed. Treatments may be given even if you do not want them. An advance directive may make it easier for your family to make difficult choices about your care. Urinary Incontinence   Urinary incontinence (UI)  is when you lose control of your bladder. UI develops because your bladder cannot store or empty urine properly. The 3 most common types of UI are stress incontinence, urge incontinence, or both. Medicines:   · May be given to help strengthen your bladder control. Report any side effects of medication to your healthcare provider. Do pelvic muscle exercises often:  Your pelvic muscles help you stop urinating. Squeeze these muscles tight for 5 seconds, then relax for 5 seconds. Gradually work up to squeezing for 10 seconds. Do 3 sets of 15 repetitions a day, or as directed. This will help strengthen your pelvic muscles and improve bladder control. Train your bladder:  Go to the bathroom at set times, such as every 2 hours, even if you do not feel the urge to go. You can also try to hold your urine when you feel the urge to go. For example, hold your urine for 5 minutes when you feel the urge to go. As that becomes easier, hold your urine for 10 minutes. Self-care:   · Keep a UI record. Write down how often you leak urine and how much you leak. Make a note of what you were doing when you leaked urine. · Drink liquids as directed. You may need to limit the amount of liquid you drink to help control your urine leakage. Do not drink any liquid right before you go to bed. Limit or do not have drinks that contain caffeine or alcohol. · Prevent constipation. Eat a variety of high-fiber foods. Good examples are high-fiber cereals, beans, vegetables, and whole-grain breads. Walking is the best way to trigger your intestines to have a bowel movement. · Exercise regularly and maintain a healthy weight. Weight loss and exercise will decrease pressure on your bladder and help you control your leakage. · Use a catheter as directed  to help empty your bladder. A catheter is a tiny, plastic tube that is put into your bladder to drain your urine.    · Go to behavior therapy as directed. Behavior therapy may be used to help you learn to control your urge to urinate. Weight Management   Why it is important to manage your weight:  Being overweight increases your risk of health conditions such as heart disease, high blood pressure, type 2 diabetes, and certain types of cancer. It can also increase your risk for osteoarthritis, sleep apnea, and other respiratory problems. Aim for a slow, steady weight loss. Even a small amount of weight loss can lower your risk of health problems. How to lose weight safely:  A safe and healthy way to lose weight is to eat fewer calories and get regular exercise. You can lose up about 1 pound a week by decreasing the number of calories you eat by 500 calories each day. Healthy meal plan for weight management:  A healthy meal plan includes a variety of foods, contains fewer calories, and helps you stay healthy. A healthy meal plan includes the following:  · Eat whole-grain foods more often. A healthy meal plan should contain fiber. Fiber is the part of grains, fruits, and vegetables that is not broken down by your body. Whole-grain foods are healthy and provide extra fiber in your diet. Some examples of whole-grain foods are whole-wheat breads and pastas, oatmeal, brown rice, and bulgur. · Eat a variety of vegetables every day. Include dark, leafy greens such as spinach, kale, eulalia greens, and mustard greens. Eat yellow and orange vegetables such as carrots, sweet potatoes, and winter squash. · Eat a variety of fruits every day. Choose fresh or canned fruit (canned in its own juice or light syrup) instead of juice. Fruit juice has very little or no fiber. · Eat low-fat dairy foods. Drink fat-free (skim) milk or 1% milk. Eat fat-free yogurt and low-fat cottage cheese. Try low-fat cheeses such as mozzarella and other reduced-fat cheeses. · Choose meat and other protein foods that are low in fat.   Choose beans or other legumes such as split peas or lentils. Choose fish, skinless poultry (chicken or turkey), or lean cuts of red meat (beef or pork). Before you cook meat or poultry, cut off any visible fat. · Use less fat and oil. Try baking foods instead of frying them. Add less fat, such as margarine, sour cream, regular salad dressing and mayonnaise to foods. Eat fewer high-fat foods. Some examples of high-fat foods include french fries, doughnuts, ice cream, and cakes. · Eat fewer sweets. Limit foods and drinks that are high in sugar. This includes candy, cookies, regular soda, and sweetened drinks. Exercise:  Exercise at least 30 minutes per day on most days of the week. Some examples of exercise include walking, biking, dancing, and swimming. You can also fit in more physical activity by taking the stairs instead of the elevator or parking farther away from stores. Ask your healthcare provider about the best exercise plan for you. © Copyright [employer] 2018 Information is for End User's use only and may not be sold, redistributed or otherwise used for commercial purposes.  All illustrations and images included in CareNotes® are the copyrighted property of [employer]., Inc. or  [employer]

## 2023-10-11 DIAGNOSIS — E03.9 PRIMARY HYPOTHYROIDISM: ICD-10-CM

## 2023-10-11 RX ORDER — LEVOTHYROXINE SODIUM 0.07 MG/1
75 TABLET ORAL DAILY
Qty: 90 TABLET | Refills: 1 | Status: SHIPPED | OUTPATIENT
Start: 2023-10-11

## 2024-02-21 PROBLEM — Z01.419 ENCOUNTER FOR ANNUAL ROUTINE GYNECOLOGICAL EXAMINATION: Status: RESOLVED | Noted: 2023-01-25 | Resolved: 2024-02-21

## 2024-03-26 ENCOUNTER — COSMETIC (OUTPATIENT)
Age: 64
End: 2024-03-26

## 2024-03-26 ENCOUNTER — OFFICE VISIT (OUTPATIENT)
Age: 64
End: 2024-03-26
Payer: COMMERCIAL

## 2024-03-26 VITALS — WEIGHT: 176.4 LBS | TEMPERATURE: 98.3 F | HEIGHT: 65 IN | BODY MASS INDEX: 29.39 KG/M2

## 2024-03-26 DIAGNOSIS — L81.4 LENTIGO: ICD-10-CM

## 2024-03-26 DIAGNOSIS — D22.60 MULTIPLE BENIGN MELANOCYTIC NEVI OF UPPER EXTREMITY, LOWER EXTREMITY, AND TRUNK: ICD-10-CM

## 2024-03-26 DIAGNOSIS — D22.70 MULTIPLE BENIGN MELANOCYTIC NEVI OF UPPER EXTREMITY, LOWER EXTREMITY, AND TRUNK: ICD-10-CM

## 2024-03-26 DIAGNOSIS — L82.1 SEBORRHEIC KERATOSIS: ICD-10-CM

## 2024-03-26 DIAGNOSIS — D18.01 CHERRY ANGIOMA: Primary | ICD-10-CM

## 2024-03-26 DIAGNOSIS — D22.5 MULTIPLE BENIGN MELANOCYTIC NEVI OF UPPER EXTREMITY, LOWER EXTREMITY, AND TRUNK: ICD-10-CM

## 2024-03-26 DIAGNOSIS — Z41.1 ENCOUNTER FOR COSMETIC PROCEDURE: Primary | ICD-10-CM

## 2024-03-26 PROCEDURE — SKNTGDERM SKIN TAG DERM ADD ON: Performed by: DERMATOLOGY

## 2024-03-26 PROCEDURE — 99203 OFFICE O/P NEW LOW 30 MIN: CPT | Performed by: DERMATOLOGY

## 2024-03-26 NOTE — PATIENT INSTRUCTIONS
"MELANOCYTIC NEVI (\"Moles\")    Based on a thorough discussion of this condition and the management approach to it (including a comprehensive discussion of the known risks, side effects and potential benefits of treatment), the patient (family) agrees to implement the following specific plan:  When outside we recommend using a wide brim hat, sunglasses, long sleeve and pants, sunscreen with SPF 30+ with reapplication every 2 hours, or SPF specific clothing   Benign, reassured  Annual skin check     Melanocytic Nevi  Melanocytic nevi (\"moles\") are tan or brown, raised or flat areas of the skin which have an increased number of melanocytes. Melanocytes are the cells in our body which make pigment and account for skin color.    Some moles are present at birth (I.e., \"congenital nevi\"), while others come up later in life (i.e., \"acquired nevi\").  The sun can stimulate the body to make more moles.  Sunburns are not the only thing that triggers more moles.  Chronic sun exposure can do it too.     Clinically distinguishing a healthy mole from melanoma may be difficult, even for experienced dermatologists. The \"ABCDE's\" of moles have been suggested as a means of helping to alert a person to a suspicious mole and the possible increased risk of melanoma.  The suggestions for raising alert are as follows:    Asymmetry: Healthy moles tend to be symmetric, while melanomas are often asymmetric.  Asymmetry means if you draw a line through the mole, the two halves do not match in color, size, shape, or surface texture. Asymmetry can be a result of rapid enlargement of a mole, the development of a raised area on a previously flat lesion, scaling, ulceration, bleeding or scabbing within the mole.  Any mole that starts to demonstrate \"asymmetry\" should be examined promptly by a board certified dermatologist.     Border: Healthy moles tend to have discrete, even borders.  The border of a melanoma often blends into the normal skin and " "does not sharply delineate the mole from normal skin.  Any mole that starts to demonstrate \"uneven borders\" should be examined promptly by a board certified dermatologist.     Color: Healthy moles tend to be one color throughout.  Melanomas tend to be made up of different colors ranging from dark black, blue, white, or red.  Any mole that demonstrates a color change should be examined promptly by a board certified dermatologist.     Diameter: Healthy moles tend to be smaller than 0.6 cm in size; an exception are \"congenital nevi\" that can be larger.  Melanomas tend to grow and can often be greater than 0.6 cm (1/4 of an inch, or the size of a pencil eraser). This is only a guideline, and many normal moles may be larger than 0.6 cm without being unhealthy.  Any mole that starts to change in size (small to bigger or bigger to smaller) should be examined promptly by a board certified dermatologist.     Evolving: Healthy moles tend to \"stay the same.\"  Melanomas may often show signs of change or evolution such as a change in size, shape, color, or elevation.  Any mole that starts to itch, bleed, crust, burn, hurt, or ulcerate or demonstrate a change or evolution should be examined promptly by a board certified dermatologist.        LENTIGO    Assessment and Plan:  Based on a thorough discussion of this condition and the management approach to it (including a comprehensive discussion of the known risks, side effects and potential benefits of treatment), the patient (family) agrees to implement the following specific plan:  When outside we recommend using a wide brim hat, sunglasses, long sleeve and pants, sunscreen with SPF 30+ with reapplication every 2 hours, or SPF specific clothing       What is a lentigo?  A lentigo is a pigmented flat or slightly raised lesion with a clearly defined edge. Unlike an ephelis (freckle), it does not fade in the winter months. There are several kinds of lentigo.  The name lentigo " originally referred to its appearance resembling a small lentil. The plural of lentigo is lentigines, although “lentigos” is also in common use.    Who gets lentigines?  Lentigines can affect males and females of all ages and races. Solar lentigines are especially prevalent in fair skinned adults. Lentigines associated with syndromes are present at birth or arise during childhood.    What causes lentigines?  Common forms of lentigo are due to exposure to ultraviolet radiation:  Sun damage including sunburn   Indoor tanning   Phototherapy, especially photochemotherapy (PUVA)    Ionizing radiation, eg radiation therapy, can also cause lentigines.  Several familial syndromes associated with widespread lentigines originate from mutations in Jose De Jesus-MAP kinase, mTOR signaling and PTEN pathways.    What is the treatment for lentigines?  Most lentigines are left alone. Attempts to lighten them may not be successful. The following approaches are used:  SPF 50+ broad-spectrum sunscreen   Hydroquinone bleaching cream   Alpha hydroxy acids   Vitamin C   Retinoids   Azelaic acid   Chemical peels  Individual lesions can be permanently removed using:  Cryotherapy   Intense pulsed light   Pigment lasers    How can lentigines be prevented?  Lentigines associated with exposure ultraviolet radiation can be prevented by very careful sun protection. Clothing is more successful at preventing new lentigines than are sunscreens.    What is the outlook for lentigines?  Lentigines usually persist. They may increase in number with age and sun exposure. Some in sun-protected sites may fade and disappear.    BURNS ANGIOMAS    Assessment and Plan:  Based on a thorough discussion of this condition and the management approach to it (including a comprehensive discussion of the known risks, side effects and potential benefits of treatment), the patient (family) agrees to implement the following specific plan:  Monitor for changes  Benign,  "reassured      Assessment and Plan:    Cherry angioma, also known as Martines de Rocco spots, are benign vascular skin lesions. A \"cherry angioma\" is a firm red, blue or purple papule, 0.1-1 cm in diameter. When thrombosed, they can appear black in colour until evaluated with a dermatoscope when the red or purple colour is more easily seen. Cherry angioma may develop on any part of the body but most often appear on the scalp, face, lips and trunk.  An angioma is due to proliferating endothelial cells; these are the cells that line the inside of a blood vessel.    Angiomas can arise in early life or later in life; the most common type of angioma is a cherry angioma.  Cherry angiomas are very common in males and females of any age or race. They are more noticeable in white skin than in skin of colour. They markedly increase in number from about the age of 40. There may be a family history of similar lesions. Eruptive cherry angiomas have been rarely reported to be associated with internal malignancy. The cause of angiomas is unknown. Genetic analysis of cherry angiomas has shown that they frequently carry specific somatic missense mutations in the GNAQ and GNA11 (Q209H) genes, which are involved in other vascular and melanocytic proliferations.      SEBORRHEIC KERATOSIS; NON-INFLAMED    Assessment and Plan:  Based on a thorough discussion of this condition and the management approach to it (including a comprehensive discussion of the known risks, side effects and potential benefits of treatment), the patient (family) agrees to implement the following specific plan:  Monitor for changes  Benign, reassured      Seborrheic Keratosis  A seborrheic keratosis is a harmless warty spot that appears during adult life as a common sign of skin aging.  Seborrheic keratoses can arise on any area of skin, covered or uncovered, with the usual exception of the palms and soles. They do not arise from mucous membranes. Seborrheic " "keratoses can have highly variable appearance.      Seborrheic keratoses are extremely common. It has been estimated that over 90% of adults over the age of 60 years have one or more of them. They occur in males and females of all races, typically beginning to erupt in the 30s or 40s. They are uncommon under the age of 20 years.  The precise cause of seborrhoeic keratoses is not known.  Seborrhoeic keratoses are considered degenerative in nature. As time goes by, seborrheic keratoses tend to become more numerous. Some people inherit a tendency to develop a very large number of them; some people may have hundreds of them.      There is no easy way to remove multiple lesions on a single occasion.  Unless a specific lesion is \"inflamed\" and is causing pain or stinging/burning or is bleeding, most insurance companies do not authorize treatment.      ACROCHORDON (\"SKIN TAG\")    Assessment and Plan:  Based on a thorough discussion of this condition and the management approach to it (including a comprehensive discussion of the known risks, side effects and potential benefits of treatment), the patient (family) agrees to implement the following specific plan:  Reassured benign.  Made aware insurance does not cover for removal.  If interested in removing a cosmetic fee of $150.00 is charge at the time of the visit.    Skin tags are common, soft, harmless skin lesions that are also called, in the appropriate settings, papillomas, fibroepithelial polyps, and soft fibromas.  They are made up of loosely arranged collagen fibers and blood vessels surrounded by a thickened or thinned-out epidermis.    Skin tags tend to develop in both men and women as we grow older.  They are usually found on the skin folds (neck, armpits, groin).  It is not known what specifically causes skin tags.  Certain factors, though, do appear to play a role:  Chaffing and irritation from skin rubbing together  High levels of growth factors (as seen, for " example, in pregnancy or in acromegaly/gigantism)  Insulin resistance  Human papillomavirus (wart virus)    We discussed that most skin tags do not need to be treated unless they are specifically causing the patient physical distress or limitation or pose a risk for a larger problem such as an infection that forms secondary to excoriation or chronic irritation.    We had a thorough discussion of treatment options and specific risks (including that any procedural treatment may not be covered by insurance and would then be the patient's responsibility) and benefits/alternatives including but not limited to the following:  Cryotherapy (freezing)  Shave removal  Surgical excision (snip excision with scissors)  Electrosurgery  Ligation (we do not do this procedure and counseled against it due to risk of tissue necrosis and infection)

## 2024-03-26 NOTE — PROGRESS NOTES
"[facility] Dermatology Clinic Note     Patient Name: Smita Valerio  Encounter Date: 3/26/24     Have you been cared for by a [facility] Dermatologist in the last 3 years and, if so, which description applies to you?    NO.   I am considered a \"new\" patient and must complete all patient intake questions. I am FEMALE/of child-bearing potential.    REVIEW OF SYSTEMS:  Have you recently had or currently have any of the following? Recent fever or chills? No  Any non-healing wound? No  Are you pregnant or planning to become pregnant? No  Are you currently or planning to be nursing or breast feeding? No   PAST MEDICAL HISTORY:  Have you personally ever had or currently have any of the following?  If \"YES,\" then please provide more detail. Skin cancer (such as Melanoma, Basal Cell Carcinoma, Squamous Cell Carcinoma?  No  Tuberculosis, HIV/AIDS, Hepatitis B or C: No  Radiation Treatment No   HISTORY OF IMMUNOSUPPRESSION:   Do you have a history of any of the following:  Systemic Immunosuppression such as Diabetes, Biologic or Immunotherapy, Chemotherapy, Organ Transplantation, Bone Marrow Transplantation?  No    Answering \"YES\" requires the addition of the dotphrase \"IMMUNOSUPPRESSED\" as the first diagnosis of the patient's visit.   FAMILY HISTORY:  Any \"first degree relatives\" (parent, brother, sister, or child) with the following?    Skin Cancer, Pancreatic or Other Cancer? YES, brother throat cancer   PATIENT EXPERIENCE:    Do you want the Dermatologist to perform a COMPLETE skin exam today including a clinical examination under the \"bra and underwear\" areas?  Yes  If necessary, do we have your permission to call and leave a detailed message on your Preferred Phone number that includes your specific medical information?  Yes      Allergies   Allergen Reactions   • Shellfish-Derived Products - Food Allergy Anaphylaxis and Itching      Current Outpatient Medications:   •  b complex vitamins tablet, Take 1 tablet by mouth " "daily., Disp: , Rfl:   •  betamethasone dipropionate (DIPROSONE) 0.05 % cream, Apply topically 2 (two) times a day (Patient taking differently: Apply topically 2 (two) times a day Per patient taking as needed), Disp: 30 g, Rfl: 0  •  calcium carbonate (OS-MARVA) 600 MG tablet, Take 600 mg by mouth daily., Disp: , Rfl:   •  EPINEPHrine (EPIPEN) 0.3 mg/0.3 mL SOAJ, Inject 0.3 mL (0.3 mg total) into a muscle once as needed for anaphylaxis, Disp: 2 each, Rfl: 1  •  latanoprost (XALATAN) 0.005 % ophthalmic solution, Administer 1 drop to both eyes daily at bedtime.  , Disp: , Rfl:   •  levothyroxine 75 mcg tablet, TAKE 1 TABLET BY MOUTH DAILY, Disp: 90 tablet, Rfl: 1  •  loratadine (CLARITIN) 10 mg tablet, Take 10 mg by mouth daily., Disp: , Rfl:   •  Magnesium 400 MG TABS, , Disp: , Rfl:   •  Multiple Vitamin (MULTIVITAMIN) tablet, Take 1 tablet by mouth daily., Disp: , Rfl:   •  Turmeric 500 MG CAPS, , Disp: , Rfl:   •  venlafaxine (EFFEXOR-XR) 37.5 mg 24 hr capsule, TAKE 1 CAPSULE BY MOUTH  DAILY, Disp: 90 capsule, Rfl: 3  •  Vitamin D, Cholecalciferol, 1000 UNITS TABS, Take 1,000 Units by mouth daily., Disp: , Rfl:           Whom besides the patient is providing clinical information about today's encounter?   NO ADDITIONAL HISTORIAN (patient alone provided history)    Physical Exam and Assessment/Plan by Diagnosis:    MELANOCYTIC NEVI (\"Moles\")    Physical Exam:  Anatomic Location Affected:   Mostly on sun-exposed areas of the trunk and extremities  Morphological Description:  Scattered, 1-4mm round to ovoid, symmetrical-appearing, even bordered, skin colored to dark brown macules/papules, mostly in sun-exposed areas  Pertinent Positives:  Pertinent Negatives:    Additional History of Present Condition:      Assessment and Plan:  Based on a thorough discussion of this condition and the management approach to it (including a comprehensive discussion of the known risks, side effects and potential benefits of treatment), " "the patient (family) agrees to implement the following specific plan:  When outside we recommend using a wide brim hat, sunglasses, long sleeve and pants, sunscreen with SPF 30+ with reapplication every 2 hours, or SPF specific clothing   Benign, reassured  Annual skin check     Melanocytic Nevi  Melanocytic nevi (\"moles\") are tan or brown, raised or flat areas of the skin which have an increased number of melanocytes. Melanocytes are the cells in our body which make pigment and account for skin color.    Some moles are present at birth (I.e., \"congenital nevi\"), while others come up later in life (i.e., \"acquired nevi\").  The sun can stimulate the body to make more moles.  Sunburns are not the only thing that triggers more moles.  Chronic sun exposure can do it too.     Clinically distinguishing a healthy mole from melanoma may be difficult, even for experienced dermatologists. The \"ABCDE's\" of moles have been suggested as a means of helping to alert a person to a suspicious mole and the possible increased risk of melanoma.  The suggestions for raising alert are as follows:    Asymmetry: Healthy moles tend to be symmetric, while melanomas are often asymmetric.  Asymmetry means if you draw a line through the mole, the two halves do not match in color, size, shape, or surface texture. Asymmetry can be a result of rapid enlargement of a mole, the development of a raised area on a previously flat lesion, scaling, ulceration, bleeding or scabbing within the mole.  Any mole that starts to demonstrate \"asymmetry\" should be examined promptly by a board certified dermatologist.     Border: Healthy moles tend to have discrete, even borders.  The border of a melanoma often blends into the normal skin and does not sharply delineate the mole from normal skin.  Any mole that starts to demonstrate \"uneven borders\" should be examined promptly by a board certified dermatologist.     Color: Healthy moles tend to be one color " "throughout.  Melanomas tend to be made up of different colors ranging from dark black, blue, white, or red.  Any mole that demonstrates a color change should be examined promptly by a board certified dermatologist.     Diameter: Healthy moles tend to be smaller than 0.6 cm in size; an exception are \"congenital nevi\" that can be larger.  Melanomas tend to grow and can often be greater than 0.6 cm (1/4 of an inch, or the size of a pencil eraser). This is only a guideline, and many normal moles may be larger than 0.6 cm without being unhealthy.  Any mole that starts to change in size (small to bigger or bigger to smaller) should be examined promptly by a board certified dermatologist.     Evolving: Healthy moles tend to \"stay the same.\"  Melanomas may often show signs of change or evolution such as a change in size, shape, color, or elevation.  Any mole that starts to itch, bleed, crust, burn, hurt, or ulcerate or demonstrate a change or evolution should be examined promptly by a board certified dermatologist.        LENTIGO    Physical Exam:  Anatomic Location Affected:  trunk, arms  Morphological Description:  Light brown macules  Pertinent Positives:  Pertinent Negatives:    Additional History of Present Condition:      Assessment and Plan:  Based on a thorough discussion of this condition and the management approach to it (including a comprehensive discussion of the known risks, side effects and potential benefits of treatment), the patient (family) agrees to implement the following specific plan:  When outside we recommend using a wide brim hat, sunglasses, long sleeve and pants, sunscreen with SPF 30+ with reapplication every 2 hours, or SPF specific clothing       What is a lentigo?  A lentigo is a pigmented flat or slightly raised lesion with a clearly defined edge. Unlike an ephelis (freckle), it does not fade in the winter months. There are several kinds of lentigo.  The name lentigo originally referred to " its appearance resembling a small lentil. The plural of lentigo is lentigines, although “lentigos” is also in common use.    Who gets lentigines?  Lentigines can affect males and females of all ages and races. Solar lentigines are especially prevalent in fair skinned adults. Lentigines associated with syndromes are present at birth or arise during childhood.    What causes lentigines?  Common forms of lentigo are due to exposure to ultraviolet radiation:  Sun damage including sunburn   Indoor tanning   Phototherapy, especially photochemotherapy (PUVA)    Ionizing radiation, eg radiation therapy, can also cause lentigines.  Several familial syndromes associated with widespread lentigines originate from mutations in Jose De Jesus-MAP kinase, mTOR signaling and PTEN pathways.    What is the treatment for lentigines?  Most lentigines are left alone. Attempts to lighten them may not be successful. The following approaches are used:  SPF 50+ broad-spectrum sunscreen   Hydroquinone bleaching cream   Alpha hydroxy acids   Vitamin C   Retinoids   Azelaic acid   Chemical peels  Individual lesions can be permanently removed using:  Cryotherapy   Intense pulsed light   Pigment lasers    How can lentigines be prevented?  Lentigines associated with exposure ultraviolet radiation can be prevented by very careful sun protection. Clothing is more successful at preventing new lentigines than are sunscreens.    What is the outlook for lentigines?  Lentigines usually persist. They may increase in number with age and sun exposure. Some in sun-protected sites may fade and disappear.    BURNS ANGIOMAS    Physical Exam:  Anatomic Location Affected:  trunk  Morphological Description:  Scattered cherry red, 1-4 mm papules.  Pertinent Positives:  Pertinent Negatives:    Additional History of Present Condition:      Assessment and Plan:  Based on a thorough discussion of this condition and the management approach to it (including a comprehensive  "discussion of the known risks, side effects and potential benefits of treatment), the patient (family) agrees to implement the following specific plan:  Monitor for changes  Benign, reassured      Assessment and Plan:    Cherry angioma, also known as Martines de Rocco spots, are benign vascular skin lesions. A \"cherry angioma\" is a firm red, blue or purple papule, 0.1-1 cm in diameter. When thrombosed, they can appear black in colour until evaluated with a dermatoscope when the red or purple colour is more easily seen. Cherry angioma may develop on any part of the body but most often appear on the scalp, face, lips and trunk.  An angioma is due to proliferating endothelial cells; these are the cells that line the inside of a blood vessel.    Angiomas can arise in early life or later in life; the most common type of angioma is a cherry angioma.  Cherry angiomas are very common in males and females of any age or race. They are more noticeable in white skin than in skin of colour. They markedly increase in number from about the age of 40. There may be a family history of similar lesions. Eruptive cherry angiomas have been rarely reported to be associated with internal malignancy. The cause of angiomas is unknown. Genetic analysis of cherry angiomas has shown that they frequently carry specific somatic missense mutations in the GNAQ and GNA11 (Q209H) genes, which are involved in other vascular and melanocytic proliferations.      SEBORRHEIC KERATOSIS; NON-INFLAMED    Physical Exam:  Anatomic Location Affected:  trunk  Morphological Description:  Flat and raised, waxy, smooth to warty textured, yellow to brownish-grey to dark brown to blackish, discrete, \"stuck-on\" appearing papules.  Pertinent Positives:  Pertinent Negatives:    Additional History of Present Condition:      Assessment and Plan:  Based on a thorough discussion of this condition and the management approach to it (including a comprehensive discussion of " "the known risks, side effects and potential benefits of treatment), the patient (family) agrees to implement the following specific plan:  Monitor for changes  Benign, reassured      Seborrheic Keratosis  A seborrheic keratosis is a harmless warty spot that appears during adult life as a common sign of skin aging.  Seborrheic keratoses can arise on any area of skin, covered or uncovered, with the usual exception of the palms and soles. They do not arise from mucous membranes. Seborrheic keratoses can have highly variable appearance.      Seborrheic keratoses are extremely common. It has been estimated that over 90% of adults over the age of 60 years have one or more of them. They occur in males and females of all races, typically beginning to erupt in the 30s or 40s. They are uncommon under the age of 20 years.  The precise cause of seborrhoeic keratoses is not known.  Seborrhoeic keratoses are considered degenerative in nature. As time goes by, seborrheic keratoses tend to become more numerous. Some people inherit a tendency to develop a very large number of them; some people may have hundreds of them.      There is no easy way to remove multiple lesions on a single occasion.  Unless a specific lesion is \"inflamed\" and is causing pain or stinging/burning or is bleeding, most insurance companies do not authorize treatment.    ACROCHORDON (\"SKIN TAG\")    Physical Exam:  Anatomic Location Affected:  Axilla and neck  Morphological Description:  brown and skin colored papules  Pertinent Positives:  Pertinent Negatives:    Additional History of Present Condition:  patient reports bothersome when wearing bra    Assessment and Plan:  Based on a thorough discussion of this condition and the management approach to it (including a comprehensive discussion of the known risks, side effects and potential benefits of treatment), the patient (family) agrees to implement the following specific plan:  Reassured benign.  Made aware " insurance does not cover for removal.  If interested in removing a cosmetic fee of $150.00 is charge at the time of the visit.    Skin tags are common, soft, harmless skin lesions that are also called, in the appropriate settings, papillomas, fibroepithelial polyps, and soft fibromas.  They are made up of loosely arranged collagen fibers and blood vessels surrounded by a thickened or thinned-out epidermis.    Skin tags tend to develop in both men and women as we grow older.  They are usually found on the skin folds (neck, armpits, groin).  It is not known what specifically causes skin tags.  Certain factors, though, do appear to play a role:  Chaffing and irritation from skin rubbing together  High levels of growth factors (as seen, for example, in pregnancy or in acromegaly/gigantism)  Insulin resistance  Human papillomavirus (wart virus)    We discussed that most skin tags do not need to be treated unless they are specifically causing the patient physical distress or limitation or pose a risk for a larger problem such as an infection that forms secondary to excoriation or chronic irritation.    We had a thorough discussion of treatment options and specific risks (including that any procedural treatment may not be covered by insurance and would then be the patient's responsibility) and benefits/alternatives including but not limited to the following:  Cryotherapy (freezing)  Shave removal  Surgical excision (snip excision with scissors)  Electrosurgery  Ligation (we do not do this procedure and counseled against it due to risk of tissue necrosis and infection)   Scribe Attestation    I,:  Nicole Brito am acting as a scribe while in the presence of the attending physician.:       I,:  Ramonita Henderson MD personally performed the services described in this documentation    as scribed in my presence.:

## 2024-03-26 NOTE — PROGRESS NOTES
"                                                                                                                 COSMETIC VISIT      SEBORRHEIC KERATOSIS; NON-INFLAMED    Physical Exam:  Anatomic Location Affected:  trunk  Morphological Description:  Flat and raised, waxy, smooth to warty textured, yellow to brownish-grey to dark brown to blackish, discrete, \"stuck-on\" appearing papules.  Pertinent Positives:  Pertinent Negatives:    Additional History of Present Condition:  Patient reports new bumps on the skin.  Denies itch, burn, pain, bleeding or ulceration.  Present constantly; nothing seems to make it worse or better.  No prior treatment.      Assessment and Plan:  Based on a thorough discussion of this condition and the management approach to it (including a comprehensive discussion of the known risks, side effects and potential benefits of treatment), the patient (family) agrees to implement the following specific plan:  Reassured benign.  Made aware insurance does not cover for removal.  If remove in the office today a fee of $20.00 each per lesion will be charge out of pocket at the end of the visit.  Understanding verbalized and consent signed.    Seborrheic Keratosis  A seborrheic keratosis is a harmless warty spot that appears during adult life as a common sign of skin aging.  Seborrheic keratoses can arise on any area of skin, covered or uncovered, with the usual exception of the palms and soles. They do not arise from mucous membranes. Seborrheic keratoses can have highly variable appearance.      Seborrheic keratoses are extremely common. It has been estimated that over 90% of adults over the age of 60 years have one or more of them. They occur in males and females of all races, typically beginning to erupt in the 30s or 40s. They are uncommon under the age of 20 years.  The precise cause of seborrhoeic keratoses is not known.  Seborrhoeic keratoses are considered degenerative in nature. As time goes " "by, seborrheic keratoses tend to become more numerous. Some people inherit a tendency to develop a very large number of them; some people may have hundreds of them.    The name \"seborrheic keratosis\" is misleading, because these lesions are not limited to a seborrhoeic distribution (scalp, mid-face, chest, upper back), nor are they formed from sebaceous glands, nor are they associated with sebum -- which is greasy.  Seborrheic keratosis may also be called \"SK,\" \"Seb K,\" \"basal cell papilloma,\" \"senile wart,\" or \"barnacle.\"      Researchers have noted:  Eruptive seborrhoeic keratoses can follow sunburn or dermatitis  Skin friction may be the reason they appear in body folds  Viral cause (e.g., human papillomavirus) seems unlikely  Stable and clonal mutations or activation of FRFR3, PIK3CA, FLORA, AKT1 and EGFR genes are found in seborrhoeic keratoses  Seborrhoeic keratosis can arise from solar lentigo  FRFR3 mutations also arise in solar lentigines. These mutations are associated with increased age and location on the head and neck, suggesting a role of ultraviolet radiation in these lesions  Seborrheic keratoses do not harbour tumour suppressor gene mutations  Epidermal growth factor receptor inhibitors, which are used to treat some cancers, often result in an increase in verrucal (warty) keratoses.    There is no easy way to remove multiple lesions on a single occasion.  Unless a specific lesion is \"inflamed\" and is causing pain or stinging/burning or is bleeding, most insurance companies do not authorize treatment.       PROCEDURE:  DESTRUCTION OF BENIGN LESIONS WITH CRYOTHERAPY  After a thorough discussion of treatment options and risk/benefits/alternatives (including but not limited to local pain, scarring, dyspigmentation, blistering, and possible superinfection), verbal and written consent were obtained and the aforementioned lesions were treated on with cryotherapy using liquid nitrogen x 1 cycle for 5-10 " seconds.    TOTAL NUMBER of 3 lesions were treated today on the ANATOMIC LOCATION: trunk.    The patient tolerated the procedure well, and after-care instructions were provided.      Scribe Attestation    I,:  Nicole Brito am acting as a scribe while in the presence of the attending physician.:       I,:  Ramonita Henderson MD personally performed the services described in this documentation    as scribed in my presence.:        DO NOT BILL INSURANCE. OUT OF POCKET PAYMENT COLLECTED AT TIME OF VISIT!

## 2024-04-01 ENCOUNTER — PATIENT MESSAGE (OUTPATIENT)
Dept: FAMILY MEDICINE CLINIC | Facility: CLINIC | Age: 64
End: 2024-04-01

## 2024-04-01 DIAGNOSIS — Z12.31 ENCOUNTER FOR SCREENING MAMMOGRAM FOR BREAST CANCER: Primary | ICD-10-CM

## 2024-04-04 LAB
T4 FREE SERPL-MCNC: 1.36 NG/DL (ref 0.82–1.77)
TSH SERPL DL<=0.005 MIU/L-ACNC: 5.96 UIU/ML (ref 0.45–4.5)

## 2024-04-28 DIAGNOSIS — E03.9 PRIMARY HYPOTHYROIDISM: ICD-10-CM

## 2024-04-28 RX ORDER — LEVOTHYROXINE SODIUM 0.07 MG/1
75 TABLET ORAL DAILY
Qty: 90 TABLET | Refills: 3 | Status: SHIPPED | OUTPATIENT
Start: 2024-04-28

## 2024-07-19 ENCOUNTER — OFFICE VISIT (OUTPATIENT)
Dept: URGENT CARE | Facility: CLINIC | Age: 64
End: 2024-07-19
Payer: COMMERCIAL

## 2024-07-19 VITALS
WEIGHT: 170 LBS | OXYGEN SATURATION: 99 % | BODY MASS INDEX: 27.32 KG/M2 | SYSTOLIC BLOOD PRESSURE: 140 MMHG | DIASTOLIC BLOOD PRESSURE: 70 MMHG | RESPIRATION RATE: 18 BRPM | HEART RATE: 104 BPM | TEMPERATURE: 100.8 F | HEIGHT: 66 IN

## 2024-07-19 DIAGNOSIS — J06.9 VIRAL URI WITH COUGH: Primary | ICD-10-CM

## 2024-07-19 PROCEDURE — G2211 COMPLEX E/M VISIT ADD ON: HCPCS | Performed by: FAMILY MEDICINE

## 2024-07-19 PROCEDURE — 99213 OFFICE O/P EST LOW 20 MIN: CPT | Performed by: FAMILY MEDICINE

## 2024-07-19 NOTE — PROGRESS NOTES
[facility]        NAME: Smita Valerio is a 64 y.o. female  : 1960    MRN: [id number]  DATE: 2024  TIME: 2:01 PM    Assessment and Plan   Viral URI with cough [J06.9]  1. Viral URI with cough              Patient Instructions     Recommend OTC decongestants given for congestion. No signs of bacterial infection today. Follow up with PCP in 3-5 days if no improvement. Proceed to ER if symptoms worsen.    Chief Complaint     Chief Complaint   Patient presents with   • Cold Like Symptoms   • Cough     Had COVID 11 days ago - continues with nasal congestion/PND. Started today with congested cough, dry throat and luzmaria. Ear fullness. No fever, HA N/V/D. Taking Coricidin.          History of Present Illness     Smita Valerio is a 64 y.o. female presenting to the office today for upper respiratory complaints. She was diagnosed with COVID 11 days ago. She ntoest that she has gotten congested since. She also developed a productive cough. She has tried Coricidin for her symptoms, with some relief.    Answers submitted by the patient for this visit:  Sore Throat Questionnaire (Submitted on 2024)  Chief Complaint: Sore throat  Chronicity: new  Onset: 1 to 4 weeks ago  Progression since onset: waxing and waning  Pain worse on: neither  Fever: no fever  pain severity now: mild  Pain - numeric: 2/10  hoarse voice: Yes  plugged ear sensation: Yes      Review of Systems     Review of Systems   Constitutional:  Negative for chills and fever.   HENT:  Positive for congestion and sore throat. Negative for ear pain, postnasal drip and sinus pain.    Eyes:  Negative for pain and itching.   Respiratory:  Positive for cough. Negative for shortness of breath and wheezing.    Cardiovascular:  Negative for chest pain and palpitations.   Gastrointestinal:  Negative for abdominal pain, constipation, diarrhea, nausea and vomiting.   Genitourinary:  Negative for difficulty urinating and hematuria.   Musculoskeletal:   Negative for arthralgias and myalgias.   Skin:  Negative for rash.   Neurological:  Negative for dizziness, light-headedness and headaches.   Psychiatric/Behavioral:  Negative for agitation and sleep disturbance. The patient is not nervous/anxious.        Current Medications       Current Outpatient Medications:   •  b complex vitamins tablet, Take 1 tablet by mouth daily., Disp: , Rfl:   •  calcium carbonate (OS-MARVA) 600 MG tablet, Take 600 mg by mouth daily., Disp: , Rfl:   •  EPINEPHrine (EPIPEN) 0.3 mg/0.3 mL SOAJ, Inject 0.3 mL (0.3 mg total) into a muscle once as needed for anaphylaxis, Disp: 2 each, Rfl: 1  •  levothyroxine 75 mcg tablet, TAKE 1 TABLET BY MOUTH DAILY, Disp: 90 tablet, Rfl: 3  •  loratadine (CLARITIN) 10 mg tablet, Take 10 mg by mouth daily., Disp: , Rfl:   •  Magnesium 400 MG TABS, , Disp: , Rfl:   •  Multiple Vitamin (MULTIVITAMIN) tablet, Take 1 tablet by mouth daily., Disp: , Rfl:   •  Turmeric 500 MG CAPS, , Disp: , Rfl:   •  venlafaxine (EFFEXOR-XR) 37.5 mg 24 hr capsule, TAKE 1 CAPSULE BY MOUTH  DAILY, Disp: 90 capsule, Rfl: 3  •  Vitamin D, Cholecalciferol, 1000 UNITS TABS, Take 1,000 Units by mouth daily., Disp: , Rfl:     Current Allergies     Allergies as of 2024 - Reviewed 2024   Allergen Reaction Noted   • Shellfish-derived products - food allergy Anaphylaxis and Itching 2016            The following portions of the patient's history were reviewed and updated as appropriate: allergies, current medications, past family history, past medical history, past social history, past surgical history and problem list.     Past Medical History:   Diagnosis Date   • Arthritis    • Complex cyst of right ovary    • Disease of thyroid gland    • Glaucoma    • Hyperthyroidism    • Uterus fibroma        Past Surgical History:   Procedure Laterality Date   •  SECTION      X2   • COLONOSCOPY     • HYSTERECTOMY     • LAPAROTOMY N/A 2016    Procedure: LAPAROTOMY  "EXPLORATORY; OVARIAN CANCER STAGING ;  Surgeon: Denny Escalante MD;  Location: [facility];  Service:    • OOPHORECTOMY     • PA HEMORRHOIDECTOMY INT & XTRNL 2/> COLUMN/MELCHRO N/A 11/5/2019    Procedure: HEMORRHOIDECTOMY EXCISION;  Surgeon: MARISOL Castaneda MD;  Location: [facility];  Service: Colorectal   • PA LAPS TOTAL HYSTERECT 250 GM/< W/RMVL TUBE/OVARY N/A 9/12/2016    Procedure: HYSTERECTOMY LAPAROSCOPIC TOTAL (LTH),cystoscopy, prostoctopy;  Surgeon: Denny Escalante MD;  Location: [facility];  Service: Gynecology Oncology   • TUBAL LIGATION         Family History   Problem Relation Age of Onset   • Heart disease Mother    • Diabetes Father    • No Known Problems Sister    • Seizures Brother    • Heart disease Brother    • No Known Problems Daughter    • No Known Problems Maternal Grandmother    • No Known Problems Maternal Grandfather    • No Known Problems Paternal Grandmother    • No Known Problems Paternal Grandfather    • Cancer Brother         lump on neck   • No Known Problems Daughter    • No Known Problems Maternal Aunt    • No Known Problems Maternal Uncle    • No Known Problems Paternal Aunt    • No Known Problems Paternal Uncle    • Breast cancer additional onset Neg Hx    • Mental illness Neg Hx        Medications have been verified.    Objective     /70   Pulse 104   Temp (!) 100.8 °F (38.2 °C)   Resp 18   Ht 5' 6\" (1.676 m)   Wt 77.1 kg (170 lb)   SpO2 99%   BMI 27.44 kg/m²   No LMP recorded. Patient has had a hysterectomy.     Physical Exam     Physical Exam  Vitals reviewed.   Constitutional:       General: She is not in acute distress.     Appearance: Normal appearance. She is not ill-appearing.   HENT:      Head: Normocephalic and atraumatic.      Right Ear: Ear canal normal. A middle ear effusion is present.      Left Ear: Ear canal normal. A middle ear effusion is present.      Mouth/Throat:      Mouth: Mucous membranes are moist.      Pharynx: Postnasal drip present. No " oropharyngeal exudate.      Tonsils: No tonsillar exudate.   Eyes:      Extraocular Movements: Extraocular movements intact.      Conjunctiva/sclera: Conjunctivae normal.      Pupils: Pupils are equal, round, and reactive to light.   Cardiovascular:      Rate and Rhythm: Normal rate and regular rhythm.      Pulses: Normal pulses.      Heart sounds: Normal heart sounds. No murmur heard.  Pulmonary:      Effort: Pulmonary effort is normal. No respiratory distress.      Breath sounds: Normal breath sounds. No wheezing.   Musculoskeletal:      Cervical back: Normal range of motion and neck supple. No tenderness.   Skin:     General: Skin is warm.   Neurological:      General: No focal deficit present.      Mental Status: She is alert.   Psychiatric:         Mood and Affect: Mood normal.         Behavior: Behavior normal.         Judgment: Judgment normal.

## 2024-07-23 ENCOUNTER — OFFICE VISIT (OUTPATIENT)
Dept: FAMILY MEDICINE CLINIC | Facility: CLINIC | Age: 64
End: 2024-07-23
Payer: COMMERCIAL

## 2024-07-23 VITALS
DIASTOLIC BLOOD PRESSURE: 66 MMHG | RESPIRATION RATE: 16 BRPM | HEIGHT: 66 IN | OXYGEN SATURATION: 98 % | WEIGHT: 168.4 LBS | TEMPERATURE: 99.5 F | BODY MASS INDEX: 27.06 KG/M2 | HEART RATE: 100 BPM | SYSTOLIC BLOOD PRESSURE: 122 MMHG

## 2024-07-23 DIAGNOSIS — J01.00 ACUTE NON-RECURRENT MAXILLARY SINUSITIS: ICD-10-CM

## 2024-07-23 DIAGNOSIS — U07.1 COVID-19: Primary | ICD-10-CM

## 2024-07-23 PROCEDURE — 99213 OFFICE O/P EST LOW 20 MIN: CPT | Performed by: NURSE PRACTITIONER

## 2024-07-23 PROCEDURE — G2211 COMPLEX E/M VISIT ADD ON: HCPCS | Performed by: NURSE PRACTITIONER

## 2024-07-23 RX ORDER — AMOXICILLIN 875 MG/1
875 TABLET, COATED ORAL 2 TIMES DAILY
Qty: 20 TABLET | Refills: 0 | Status: SHIPPED | OUTPATIENT
Start: 2024-07-23 | End: 2024-08-02

## 2024-07-23 NOTE — PROGRESS NOTES
Ambulatory Visit  Name: Smita Valerio      : 1960      MRN: [id number]  Encounter Provider: NIK Licea  Encounter Date: 2024   Encounter department: [facility]    Will treat for secondary sinusitis.  Reviewed symptomatic treatment.  Follow-up for any persistent or worsening symptoms  Assessment & Plan   1. COVID-19  2. Acute non-recurrent maxillary sinusitis  -     amoxicillin (AMOXIL) 875 mg tablet; Take 1 tablet (875 mg total) by mouth 2 (two) times a day for 10 days       History of Present Illness     Here today following up on COVID infection.  She has been sick for the past couple of weeks, now with a cough and increasing sinus pressure.  No recent fevers or breathing difficulties.  She is not taking anything OTC    Sore Throat   This is a new problem. The current episode started in the past 7 days. The problem has been waxing and waning. The pain is worse on the right side. There has been no fever. The fever has been present for Less than 1 day. The pain is at a severity of 6/10. The pain is moderate. Associated symptoms include congestion, coughing, ear pain, headaches, a hoarse voice, a plugged ear sensation, swollen glands and trouble swallowing. Pertinent negatives include no abdominal pain, diarrhea, shortness of breath or vomiting.       Review of Systems   Constitutional:  Negative for chills, fatigue and fever.   HENT:  Positive for congestion, ear pain, hoarse voice, sinus pressure, sore throat and trouble swallowing. Negative for postnasal drip and rhinorrhea.    Respiratory:  Positive for cough. Negative for shortness of breath and wheezing.    Cardiovascular:  Negative for chest pain.   Gastrointestinal:  Negative for abdominal pain, diarrhea, nausea and vomiting.   Musculoskeletal:  Negative for arthralgias.   Skin:  Negative for rash.   Neurological:  Positive for headaches.       Objective     /66   Pulse 100   Temp 99.5 °F (37.5 °C)   Resp 16   " Ht 5' 6\" (1.676 m)   Wt 76.4 kg (168 lb 6.4 oz)   SpO2 98%   BMI 27.18 kg/m²     Physical Exam  Vitals and nursing note reviewed.   Constitutional:       General: She is not in acute distress.     Appearance: Normal appearance.   HENT:      Head: Normocephalic and atraumatic.      Right Ear: A middle ear effusion is present. Tympanic membrane is erythematous and bulging.      Nose: Congestion present.      Mouth/Throat:      Pharynx: Oropharynx is clear.   Eyes:      Conjunctiva/sclera: Conjunctivae normal.   Neck:      Vascular: No carotid bruit.   Cardiovascular:      Rate and Rhythm: Normal rate and regular rhythm.      Pulses: Normal pulses.      Heart sounds: Normal heart sounds. No murmur heard.  Pulmonary:      Effort: Pulmonary effort is normal.      Breath sounds: Normal breath sounds.   Lymphadenopathy:      Cervical: No cervical adenopathy.   Skin:     General: Skin is warm and dry.   Neurological:      Mental Status: She is alert.   Psychiatric:         Mood and Affect: Mood normal.         Behavior: Behavior normal.       Administrative Statements           "

## 2024-08-18 DIAGNOSIS — F41.9 ANXIETY: ICD-10-CM

## 2024-08-18 RX ORDER — VENLAFAXINE HYDROCHLORIDE 37.5 MG/1
CAPSULE, EXTENDED RELEASE ORAL
Qty: 90 CAPSULE | Refills: 1 | Status: SHIPPED | OUTPATIENT
Start: 2024-08-18

## 2024-10-20 ENCOUNTER — RA CDI HCC (OUTPATIENT)
Dept: OTHER | Facility: HOSPITAL | Age: 64
End: 2024-10-20

## 2024-10-20 PROBLEM — Z12.31 ENCOUNTER FOR SCREENING MAMMOGRAM FOR BREAST CANCER: Status: RESOLVED | Noted: 2023-01-25 | Resolved: 2024-10-20

## 2024-10-29 ENCOUNTER — OFFICE VISIT (OUTPATIENT)
Dept: FAMILY MEDICINE CLINIC | Facility: CLINIC | Age: 64
End: 2024-10-29
Payer: COMMERCIAL

## 2024-10-29 VITALS
DIASTOLIC BLOOD PRESSURE: 70 MMHG | OXYGEN SATURATION: 97 % | HEIGHT: 66 IN | TEMPERATURE: 97.6 F | WEIGHT: 167 LBS | HEART RATE: 67 BPM | RESPIRATION RATE: 18 BRPM | SYSTOLIC BLOOD PRESSURE: 120 MMHG | BODY MASS INDEX: 26.84 KG/M2

## 2024-10-29 DIAGNOSIS — Z79.899 ENCOUNTER FOR LONG-TERM CURRENT USE OF MEDICATION: ICD-10-CM

## 2024-10-29 DIAGNOSIS — M85.80 LOW BONE MASS: ICD-10-CM

## 2024-10-29 DIAGNOSIS — E03.9 PRIMARY HYPOTHYROIDISM: ICD-10-CM

## 2024-10-29 DIAGNOSIS — E78.00 ELEVATED SERUM CHOLESTEROL: ICD-10-CM

## 2024-10-29 DIAGNOSIS — Z00.00 INITIAL MEDICARE ANNUAL WELLNESS VISIT: Primary | ICD-10-CM

## 2024-10-29 DIAGNOSIS — Z23 NEED FOR INFLUENZA VACCINATION: ICD-10-CM

## 2024-10-29 PROCEDURE — G0438 PPPS, INITIAL VISIT: HCPCS | Performed by: FAMILY MEDICINE

## 2024-10-29 PROCEDURE — 90662 IIV NO PRSV INCREASED AG IM: CPT

## 2024-10-29 PROCEDURE — G0008 ADMIN INFLUENZA VIRUS VAC: HCPCS

## 2024-10-29 NOTE — PROGRESS NOTES
Ambulatory Visit  Name: Smita Valerio      : 1960      MRN: [id number]  Encounter Provider: Fatmata Redding DO  Encounter Date: 10/29/2024   Encounter department: Located within [facility]    Assessment & Plan  Initial Medicare annual wellness visit         Primary hypothyroidism    Orders:    TSH, 3rd generation; Future    T4, free; Future    TSH, 3rd generation    T4, free    T4, free; Future    TSH, 3rd generation; Future    T4, free    TSH, 3rd generation    Elevated serum cholesterol    Orders:    Lipid Panel with Direct LDL reflex; Future    Lipid Panel with Direct LDL reflex    Low bone mass    Orders:    Comprehensive metabolic panel; Future    Vitamin D 25 hydroxy; Future    Comprehensive metabolic panel    Vitamin D 25 hydroxy    Encounter for long-term current use of medication    Orders:    CBC; Future    CBC    Need for influenza vaccination    Orders:    influenza vaccine, high-dose, PF 0.7 mL (FLUZONE HIGH-DOSE)       Preventive health issues were discussed with patient, and age appropriate screening tests were ordered as noted in patient's After Visit Summary. Personalized health advice and appropriate referrals for health education or preventive services given if needed, as noted in patient's After Visit Summary.    History of Present Illness     Her  is on the kidney transplant list.  She is expecting 2 grandchildren.  She has been taking her thyroid medication daily.  Otherwise she is doing okay       Patient Care Team:  Fatmaat Redding DO as PCP - General  MD Denny Abbott MD    Review of Systems  Medical History Reviewed by provider this encounter:  Tobacco  Allergies  Meds  Problems  Med Hx  Surg Hx  Fam Hx       Annual Wellness Visit Questionnaire   Smita is here for her Subsequent Wellness visit.     Health Risk Assessment:   Patient rates overall health as very good. Patient feels that their physical health rating is same. Patient is very satisfied  with their life. Eyesight was rated as same. Hearing was rated as same. Patient feels that their emotional and mental health rating is same. Patients states they are never, rarely angry. Patient states they are never, rarely unusually tired/fatigued. Pain experienced in the last 7 days has been none. Patient states that she has experienced no weight loss or gain in last 6 months.     Depression Screening:   PHQ-2 Score: 0      Fall Risk Screening:   In the past year, patient has experienced: no history of falling in past year      Urinary Incontinence Screening:   Patient has leaked urine accidently in the last six months.     Home Safety:  Patient does not have trouble with stairs inside or outside of their home. Patient has working smoke alarms and has working carbon monoxide detector. Home safety hazards include: none.     Nutrition:   Current diet is Regular.     Medications:   Patient is not currently taking any over-the-counter supplements. Patient is able to manage medications.     Activities of Daily Living (ADLs)/Instrumental Activities of Daily Living (IADLs):   Walk and transfer into and out of bed and chair?: Yes  Dress and groom yourself?: Yes    Bathe or shower yourself?: Yes    Feed yourself? Yes  Do your laundry/housekeeping?: Yes  Manage your money, pay your bills and track your expenses?: Yes  Make your own meals?: Yes    Do your own shopping?: Yes    Durable Medical Equipment Suppliers  CPAP [employer]    Previous Hospitalizations:   Any hospitalizations or ED visits within the last 12 months?: No      Advance Care Planning:   Living will: No    Durable POA for healthcare: No    Advanced directive: No    Advanced directive counseling given: Yes    ACP document given: Yes    End of Life Decisions reviewed with patient: Yes    Provider agrees with end of life decisions: Yes      Cognitive Screening:   Provider or family/friend/caregiver concerned regarding cognition?: No    PREVENTIVE  "SCREENINGS      Cardiovascular Screening:    General: Screening Current      Diabetes Screening:     General: Risks and Benefits Discussed    Due for: Blood Glucose      Colorectal Cancer Screening:     General: Screening Current      Breast Cancer Screening:     General: Screening Current      Cervical Cancer Screening:    General: Screening Not Indicated      Osteoporosis Screening:    General: Screening Current      Abdominal Aortic Aneurysm (AAA) Screening:        General: Screening Not Indicated      Lung Cancer Screening:     General: Screening Not Indicated      Hepatitis C Screening:    General: Screening Current    Screening, Brief Intervention, and Referral to Treatment (SBIRT)    Screening  Typical number of drinks in a day: 0  Typical number of drinks in a week: 1  Interpretation: Low risk drinking behavior.    AUDIT-C Screenin) How often did you have a drink containing alcohol in the past year? 2 to 4 times a month  2) How many drinks did you have on a typical day when you were drinking in the past year? 1 to 2  3) How often did you have 6 or more drinks on one occasion in the past year? never    AUDIT-C Score: 2  Interpretation: Score 0-2 (female): Negative screen for alcohol misuse    Single Item Drug Screening:  How often have you used an illegal drug (including marijuana) or a prescription medication for non-medical reasons in the past year? weekly    Single Item Drug Screen Score: 3  Interpretation: POSITIVE screen for possible drug use disorder    Drug Abuse Screening Test (DAST-10):  1) Have you used drugs other than those required for medical reasons? No  2) Do you abuse more than one drug at a time? No  3) Are you always able to stop using drugs when you want to? Yes  4) Have you had \"blackouts\" or \"flashbacks\" as a result of drug use? No  5) Do you ever feel bad or guilty about your drug use? No  6) Does your spouse (or parents) ever complain about your involvement with drugs? No  7) Have " "you neglected your family because of your use of drugs? No  8) Have you engaged in illegal activities in order to obtain drugs? No  9) Have you ever experienced withdrawal symptoms (felt sick) when you stopped taking drugs? No  10) Have you had medical problems as a result of your drug use (e.g., memory loss, hepatitis, convulsions, bleeding, etc.)? No    DAST-10 Score: 0  Interpretation: No problems reported    Brief Intervention  Alcohol & drug use screenings were reviewed. No concerns regarding substance use disorder identified.     Social Determinants of Health     Financial Resource Strain: Low Risk  (9/15/2023)    Overall Financial Resource Strain (CARDIA)     Difficulty of Paying Living Expenses: Not very hard   Food Insecurity: No Food Insecurity (10/29/2024)    Hunger Vital Sign     Worried About Running Out of Food in the Last Year: Never true     Ran Out of Food in the Last Year: Never true   Transportation Needs: No Transportation Needs (10/29/2024)    PRAPARE - Transportation     Lack of Transportation (Medical): No     Lack of Transportation (Non-Medical): No   Housing Stability: Unknown (10/29/2024)    Housing Stability Vital Sign     Unable to Pay for Housing in the Last Year: No     Homeless in the Last Year: No   Utilities: Not At Risk (10/29/2024)    [facility] Utilities     Threatened with loss of utilities: No     No results found.    Objective     /70   Pulse 67   Temp 97.6 °F (36.4 °C)   Resp 18   Ht 5' 6\" (1.676 m)   Wt 75.8 kg (167 lb)   SpO2 97%   BMI 26.95 kg/m²     Physical Exam  Vitals and nursing note reviewed.   Constitutional:       General: She is not in acute distress.     Appearance: She is well-developed.   HENT:      Head: Normocephalic and atraumatic.      Right Ear: Tympanic membrane normal.      Left Ear: Tympanic membrane normal.   Cardiovascular:      Rate and Rhythm: Normal rate and regular rhythm.      Heart sounds: No murmur heard.  Pulmonary:      Effort: Pulmonary " effort is normal. No respiratory distress.      Breath sounds: Normal breath sounds.   Musculoskeletal:      Cervical back: Neck supple.   Neurological:      Mental Status: She is alert.

## 2024-10-29 NOTE — ASSESSMENT & PLAN NOTE
Orders:    Comprehensive metabolic panel; Future    Vitamin D 25 hydroxy; Future    Comprehensive metabolic panel    Vitamin D 25 hydroxy

## 2024-10-29 NOTE — PATIENT INSTRUCTIONS
Medicare Preventive Visit Patient Instructions  Thank you for completing your Welcome to Medicare Visit or Medicare Annual Wellness Visit today. Your next wellness visit will be due in one year (10/30/2025).  The screening/preventive services that you may require over the next 5-10 years are detailed below. Some tests may not apply to you based off risk factors and/or age. Screening tests ordered at today's visit but not completed yet may show as past due. Also, please note that scanned in results may not display below.  Preventive Screenings:  Service Recommendations Previous Testing/Comments   Colorectal Cancer Screening  * Colonoscopy    * Fecal Occult Blood Test (FOBT)/Fecal Immunochemical Test (FIT)  * Fecal DNA/Cologuard Test  * Flexible Sigmoidoscopy Age: 45-75 years old   Colonoscopy: every 10 years (may be performed more frequently if at higher risk)  OR  FOBT/FIT: every 1 year  OR  Cologuard: every 3 years  OR  Sigmoidoscopy: every 5 years  Screening may be recommended earlier than age 45 if at higher risk for colorectal cancer. Also, an individualized decision between you and your healthcare provider will decide whether screening between the ages of 76-85 would be appropriate. Colonoscopy: 01/28/2021  FOBT/FIT: Not on file  Cologuard: Not on file  Sigmoidoscopy: Not on file    Screening Current     Breast Cancer Screening Age: 40+ years old  Frequency: every 1-2 years  Not required if history of left and right mastectomy Mammogram: 03/28/2023    Screening Current   Cervical Cancer Screening Between the ages of 21-29, pap smear recommended once every 3 years.   Between the ages of 30-65, can perform pap smear with HPV co-testing every 5 years.   Recommendations may differ for women with a history of total hysterectomy, cervical cancer, or abnormal pap smears in past. Pap Smear: 01/12/2021    Screening Not Indicated   Hepatitis C Screening Once for adults born between 1945 and 1965  More frequently in  patients at high risk for Hepatitis C Hep C Antibody: 12/12/2018    Screening Current   Diabetes Screening 1-2 times per year if you're at risk for diabetes or have pre-diabetes Fasting glucose: No results in last 5 years (No results in last 5 years)  A1C: No results in last 5 years (No results in last 5 years)      Cholesterol Screening Once every 5 years if you don't have a lipid disorder. May order more often based on risk factors. Lipid panel: 09/08/2023    Screening Current     Other Preventive Screenings Covered by Medicare:  Abdominal Aortic Aneurysm (AAA) Screening: covered once if your at risk. You're considered to be at risk if you have a family history of AAA.  Lung Cancer Screening: covers low dose CT scan once per year if you meet all of the following conditions: (1) Age 55-77; (2) No signs or symptoms of lung cancer; (3) Current smoker or have quit smoking within the last 15 years; (4) You have a tobacco smoking history of at least 20 pack years (packs per day multiplied by number of years you smoked); (5) You get a written order from a healthcare provider.  Glaucoma Screening: covered annually if you're considered high risk: (1) You have diabetes OR (2) Family history of glaucoma OR (3)  aged 50 and older OR (4)  American aged 65 and older  Osteoporosis Screening: covered every 2 years if you meet one of the following conditions: (1) You're estrogen deficient and at risk for osteoporosis based off medical history and other findings; (2) Have a vertebral abnormality; (3) On glucocorticoid therapy for more than 3 months; (4) Have primary hyperparathyroidism; (5) On osteoporosis medications and need to assess response to drug therapy.   Last bone density test (DXA Scan): 07/28/2022.  HIV Screening: covered annually if you're between the age of 15-65. Also covered annually if you are younger than 15 and older than 65 with risk factors for HIV infection. For pregnant patients, it is  covered up to 3 times per pregnancy.    Immunizations:  Immunization Recommendations   Influenza Vaccine Annual influenza vaccination during flu season is recommended for all persons aged >= 6 months who do not have contraindications   Pneumococcal Vaccine   * Pneumococcal conjugate vaccine = PCV13 (Prevnar 13), PCV15 (Vaxneuvance), PCV20 (Prevnar 20)  * Pneumococcal polysaccharide vaccine = PPSV23 (Pneumovax) Adults 19-65 yo with certain risk factors or if 65+ yo  If never received any pneumonia vaccine: recommend Prevnar 20 (PCV20)  Give PCV20 if previously received 1 dose of PCV13 or PPSV23   Hepatitis B Vaccine 3 dose series if at intermediate or high risk (ex: diabetes, end stage renal disease, liver disease)   Respiratory syncytial virus (RSV) Vaccine - COVERED BY MEDICARE PART D  * RSVPreF3 (Arexvy) CDC recommends that adults 60 years of age and older may receive a single dose of RSV vaccine using shared clinical decision-making (SCDM)   Tetanus (Td) Vaccine - COST NOT COVERED BY MEDICARE PART B Following completion of primary series, a booster dose should be given every 10 years to maintain immunity against tetanus. Td may also be given as tetanus wound prophylaxis.   Tdap Vaccine - COST NOT COVERED BY MEDICARE PART B Recommended at least once for all adults. For pregnant patients, recommended with each pregnancy.   Shingles Vaccine (Shingrix) - COST NOT COVERED BY MEDICARE PART B  2 shot series recommended in those 19 years and older who have or will have weakened immune systems or those 50 years and older     Health Maintenance Due:      Topic Date Due   • Breast Cancer Screening: Mammogram  03/28/2024   • Colorectal Cancer Screening  01/28/2026   • Hepatitis C Screening  Completed     Immunizations Due:      Topic Date Due   • Influenza Vaccine (1) 09/01/2024   • COVID-19 Vaccine (5 - 2023-24 season) 09/01/2024     Advance Directives   What are advance directives?  Advance directives are legal documents  that state your wishes and plans for medical care. These plans are made ahead of time in case you lose your ability to make decisions for yourself. Advance directives can apply to any medical decision, such as the treatments you want, and if you want to donate organs.   What are the types of advance directives?  There are many types of advance directives, and each state has rules about how to use them. You may choose a combination of any of the following:  Living will:  This is a written record of the treatment you want. You can also choose which treatments you do not want, which to limit, and which to stop at a certain time. This includes surgery, medicine, IV fluid, and tube feedings.   Durable power of  for healthcare (DPAHC):  This is a written record that states who you want to make healthcare choices for you when you are unable to make them for yourself. This person, called a proxy, is usually a family member or a friend. You may choose more than 1 proxy.  Do not resuscitate (DNR) order:  A DNR order is used in case your heart stops beating or you stop breathing. It is a request not to have certain forms of treatment, such as CPR. A DNR order may be included in other types of advance directives.  Medical directive:  This covers the care that you want if you are in a coma, near death, or unable to make decisions for yourself. You can list the treatments you want for each condition. Treatment may include pain medicine, surgery, blood transfusions, dialysis, IV or tube feedings, and a ventilator (breathing machine).  Values history:  This document has questions about your views, beliefs, and how you feel and think about life. This information can help others choose the care that you would choose.  Why are advance directives important?  An advance directive helps you control your care. Although spoken wishes may be used, it is better to have your wishes written down. Spoken wishes can be misunderstood, or  not followed. Treatments may be given even if you do not want them. An advance directive may make it easier for your family to make difficult choices about your care.   Urinary Incontinence   Urinary incontinence (UI)  is when you lose control of your bladder. UI develops because your bladder cannot store or empty urine properly. The 3 most common types of UI are stress incontinence, urge incontinence, or both.  Medicines:   May be given to help strengthen your bladder control. Report any side effects of medication to your healthcare provider.  Do pelvic muscle exercises often:  Your pelvic muscles help you stop urinating. Squeeze these muscles tight for 5 seconds, then relax for 5 seconds. Gradually work up to squeezing for 10 seconds. Do 3 sets of 15 repetitions a day, or as directed. This will help strengthen your pelvic muscles and improve bladder control.  Train your bladder:  Go to the bathroom at set times, such as every 2 hours, even if you do not feel the urge to go. You can also try to hold your urine when you feel the urge to go. For example, hold your urine for 5 minutes when you feel the urge to go. As that becomes easier, hold your urine for 10 minutes.   Self-care:   Keep a UI record.  Write down how often you leak urine and how much you leak. Make a note of what you were doing when you leaked urine.  Drink liquids as directed. You may need to limit the amount of liquid you drink to help control your urine leakage. Do not drink any liquid right before you go to bed. Limit or do not have drinks that contain caffeine or alcohol.   Prevent constipation.  Eat a variety of high-fiber foods. Good examples are high-fiber cereals, beans, vegetables, and whole-grain breads. Walking is the best way to trigger your intestines to have a bowel movement.  Exercise regularly and maintain a healthy weight.  Weight loss and exercise will decrease pressure on your bladder and help you control your leakage.   Use a  catheter as directed  to help empty your bladder. A catheter is a tiny, plastic tube that is put into your bladder to drain your urine.   Go to behavior therapy as directed.  Behavior therapy may be used to help you learn to control your urge to urinate.    Weight Management   Why it is important to manage your weight:  Being overweight increases your risk of health conditions such as heart disease, high blood pressure, type 2 diabetes, and certain types of cancer. It can also increase your risk for osteoarthritis, sleep apnea, and other respiratory problems. Aim for a slow, steady weight loss. Even a small amount of weight loss can lower your risk of health problems.  How to lose weight safely:  A safe and healthy way to lose weight is to eat fewer calories and get regular exercise. You can lose up about 1 pound a week by decreasing the number of calories you eat by 500 calories each day.   Healthy meal plan for weight management:  A healthy meal plan includes a variety of foods, contains fewer calories, and helps you stay healthy. A healthy meal plan includes the following:  Eat whole-grain foods more often.  A healthy meal plan should contain fiber. Fiber is the part of grains, fruits, and vegetables that is not broken down by your body. Whole-grain foods are healthy and provide extra fiber in your diet. Some examples of whole-grain foods are whole-wheat breads and pastas, oatmeal, brown rice, and bulgur.  Eat a variety of vegetables every day.  Include dark, leafy greens such as spinach, kale, eulalia greens, and mustard greens. Eat yellow and orange vegetables such as carrots, sweet potatoes, and winter squash.   Eat a variety of fruits every day.  Choose fresh or canned fruit (canned in its own juice or light syrup) instead of juice. Fruit juice has very little or no fiber.  Eat low-fat dairy foods.  Drink fat-free (skim) milk or 1% milk. Eat fat-free yogurt and low-fat cottage cheese. Try low-fat cheeses such  as mozzarella and other reduced-fat cheeses.  Choose meat and other protein foods that are low in fat.  Choose beans or other legumes such as split peas or lentils. Choose fish, skinless poultry (chicken or turkey), or lean cuts of red meat (beef or pork). Before you cook meat or poultry, cut off any visible fat.   Use less fat and oil.  Try baking foods instead of frying them. Add less fat, such as margarine, sour cream, regular salad dressing and mayonnaise to foods. Eat fewer high-fat foods. Some examples of high-fat foods include french fries, doughnuts, ice cream, and cakes.  Eat fewer sweets.  Limit foods and drinks that are high in sugar. This includes candy, cookies, regular soda, and sweetened drinks.  Exercise:  Exercise at least 30 minutes per day on most days of the week. Some examples of exercise include walking, biking, dancing, and swimming. You can also fit in more physical activity by taking the stairs instead of the elevator or parking farther away from stores. Ask your healthcare provider about the best exercise plan for you.      © Copyright [employer] 2018 Information is for End User's use only and may not be sold, redistributed or otherwise used for commercial purposes. All illustrations and images included in CareNotes® are the copyrighted property of A.D.A.M., Inc. or [employer]

## 2024-10-29 NOTE — ASSESSMENT & PLAN NOTE
Orders:    TSH, 3rd generation; Future    T4, free; Future    TSH, 3rd generation    T4, free    T4, free; Future    TSH, 3rd generation; Future    T4, free    TSH, 3rd generation

## 2024-10-31 ENCOUNTER — HOSPITAL ENCOUNTER (OUTPATIENT)
Dept: RADIOLOGY | Facility: HOSPITAL | Age: 64
Discharge: HOME | End: 2024-10-31
Payer: COMMERCIAL

## 2024-10-31 VITALS — BODY MASS INDEX: 26.52 KG/M2 | HEIGHT: 66 IN | WEIGHT: 165 LBS

## 2024-10-31 DIAGNOSIS — Z12.31 ENCOUNTER FOR SCREENING MAMMOGRAM FOR BREAST CANCER: ICD-10-CM

## 2024-10-31 PROCEDURE — 77063 BREAST TOMOSYNTHESIS BI: CPT

## 2024-10-31 PROCEDURE — 77067 SCR MAMMO BI INCL CAD: CPT

## 2024-12-03 ENCOUNTER — TELEPHONE (OUTPATIENT)
Age: 64
End: 2024-12-03

## 2024-12-03 NOTE — TELEPHONE ENCOUNTER
Pt called in regarding her cpap / pt stated her cpap stopped working / pt was last seen 3 years ago / Transfer pt to sleep med since she she needs to be schedule at the [place] office /     Sleep issue only

## 2024-12-03 NOTE — TELEPHONE ENCOUNTER
Patient was transferred from [facility] she has a CPAP machine that stopped working. She was being treated for sleep apnea. Patient stated machine turns on but air does not come into tube or mask.   # [phone]

## 2024-12-03 NOTE — TELEPHONE ENCOUNTER
Patient of NIK Aflaro at [facility] [place], last office visit 1/29/2020.    Patient has never been seen by a Sleep Medicine provider, would require a consultation.    Returned call from patient, recommended she reach ou tto the pulmonary office and provided the telephone number to pulmonology [phone] and [phone] to see if they would be able to accommodate an appointment to provide a Rx for CPAP.    Advised a consultation with a Sleep Medicine provider would be required for patient to receive Rx for CPAP from Sleep Medicine.  Patient scheduled for consultation with Dr. Araujo 3/11/2025 in [place].  Patient will call to cancel appointment if pulmonary office will continue to address sleep concerns.    Also recommended patient reach out to [facility] [phone] or bring PAP device to the [facility] location for a diagnostics check and troubleshooting.

## 2024-12-09 DIAGNOSIS — Z91.013 SHELLFISH ALLERGY: ICD-10-CM

## 2024-12-10 RX ORDER — EPINEPHRINE 0.3 MG/.3ML
INJECTION SUBCUTANEOUS
Qty: 2 EACH | Refills: 1 | Status: SHIPPED | OUTPATIENT
Start: 2024-12-10

## 2024-12-13 ENCOUNTER — RESULTS FOLLOW-UP (OUTPATIENT)
Dept: FAMILY MEDICINE CLINIC | Facility: CLINIC | Age: 64
End: 2024-12-13

## 2024-12-13 LAB
T4 FREE SERPL-MCNC: 1.42 NG/DL (ref 0.82–1.77)
TSH SERPL DL<=0.005 MIU/L-ACNC: 3.89 UIU/ML (ref 0.45–4.5)

## 2025-01-28 ENCOUNTER — OFFICE VISIT (OUTPATIENT)
Dept: SLEEP CENTER | Facility: CLINIC | Age: 65
End: 2025-01-28
Payer: COMMERCIAL

## 2025-01-28 ENCOUNTER — TELEPHONE (OUTPATIENT)
Dept: SLEEP CENTER | Facility: CLINIC | Age: 65
End: 2025-01-28

## 2025-01-28 VITALS
HEART RATE: 84 BPM | BODY MASS INDEX: 27.23 KG/M2 | SYSTOLIC BLOOD PRESSURE: 126 MMHG | OXYGEN SATURATION: 98 % | DIASTOLIC BLOOD PRESSURE: 78 MMHG | HEIGHT: 66 IN | WEIGHT: 169.4 LBS

## 2025-01-28 DIAGNOSIS — J30.9 ALLERGIC RHINITIS, UNSPECIFIED SEASONALITY, UNSPECIFIED TRIGGER: ICD-10-CM

## 2025-01-28 DIAGNOSIS — F41.9 ANXIETY: Chronic | ICD-10-CM

## 2025-01-28 DIAGNOSIS — G47.33 OSA (OBSTRUCTIVE SLEEP APNEA): Primary | ICD-10-CM

## 2025-01-28 PROCEDURE — 99204 OFFICE O/P NEW MOD 45 MIN: CPT | Performed by: STUDENT IN AN ORGANIZED HEALTH CARE EDUCATION/TRAINING PROGRAM

## 2025-01-28 PROCEDURE — G2211 COMPLEX E/M VISIT ADD ON: HCPCS | Performed by: STUDENT IN AN ORGANIZED HEALTH CARE EDUCATION/TRAINING PROGRAM

## 2025-01-28 RX ORDER — FLUTICASONE PROPIONATE 50 MCG
1 SPRAY, SUSPENSION (ML) NASAL DAILY
Qty: 16 G | Refills: 4 | Status: SHIPPED | OUTPATIENT
Start: 2025-01-28 | End: 2026-01-28

## 2025-01-28 NOTE — PATIENT INSTRUCTIONS
"Patient Education     Sleep apnea in adults   The Basics   Written by the doctors and editors at [facility]   What is sleep apnea? -- Sleep apnea is a condition that makes you stop breathing for short periods while you are asleep. There are 2 types of sleep apnea. One is called \"obstructive sleep apnea.\" The other is called \"central sleep apnea.\"  In obstructive sleep apnea, you stop breathing because your throat narrows or closes (figure 1). In central sleep apnea, you stop breathing because your brain does not send the right signals to your muscles to make you breathe. When people talk about sleep apnea, they are usually referring to obstructive sleep apnea, which is what this article is about.  People with sleep apnea do not know that they stop breathing when they are asleep. But they do sometimes wake up startled or gasping for breath. They also often hear from loved ones that they snore.  What are the symptoms of sleep apnea? -- The main symptoms of sleep apnea are loud snoring, tiredness, and daytime sleepiness. Other symptoms can include:   Restless sleep   Waking up choking or gasping   Morning headaches, dry mouth, or sore throat   Waking up often to urinate   Waking up feeling unrested or groggy   Trouble thinking clearly or remembering things  Some people with sleep apnea don't have symptoms, or don't realize that they have them.  Should I see a doctor or nurse? -- Yes. If you think that you might have sleep apnea, see your doctor.  Is there a test for sleep apnea? -- Yes. First, your doctor or nurse will ask about your symptoms. If you have a bed partner, they might also ask that person if you snore or gasp in your sleep. If the doctor or nurse suspects that you have sleep apnea, they might send you for a \"sleep study.\"  Sleep studies can sometimes be done at home, but they are usually done in a sleep lab. For the study, you spend the night in the lab, and you are hooked up to different machines that " "monitor your heart rate, breathing, and other body functions. The results of the test tell your doctor or nurse if you have the disorder.  Is there anything I can do on my own to help my sleep apnea? -- Yes. Some things that might help:   Try to avoid sleeping on your back, if possible. This might help some people.   Lose weight, if you have excess body weight.   Get regular physical activity. This might help you lose weight. But even if it doesn't, being active is good for your health.   Avoid alcohol, especially in the evening. Alcohol can make sleep apnea worse.  How is sleep apnea treated? -- Treatment can include:   Weight loss - As mentioned above, weight loss can help if you have excess weight or obesity. But losing weight can be challenging, and it takes time to lose enough weight to help with your sleep apnea. Most people need other treatment while they work on losing weight.   CPAP - The most effective treatment for sleep apnea is a device that keeps your airway open while you sleep. Treatment with this device is called \"continuous positive airway pressure\" (\"CPAP\"). People getting CPAP wear a face mask at night that keeps them breathing (figure 2).  If your doctor or nurse recommends a CPAP machine, be patient about using it. The mask might seem uncomfortable to wear at first, and the machine might seem noisy, but using the machine can really help you. People with sleep apnea who use a CPAP machine feel more rested and generally feel better.  If CPAP does not work, your doctor might suggest other treatment. Options might include:   An oral device - This is a device that you wear in your mouth. It is called an \"oral appliance\" or \"mandibular advancement device.\" It helps keep your airway open while you sleep.   Hypoglossal nerve stimulation - This involves a procedure to implant a small device into your chest. The device has a wire that connects to the nerve under your tongue. While you are sleeping, it " sends an electrical signal that causes the tongue to push forward. This helps open up your airway.   Surgery to widen your airway - This might involve removing your tonsils or other tissue that blocks the airway.  Is sleep apnea dangerous? -- It can be. Risks include:   Accidents - People with sleep apnea do not get good-quality sleep, so they are often tired and not alert. This puts them at risk for car accidents and other types of accidents.   Other health problems - Studies show that people with sleep apnea are more likely than others to have high blood pressure, heart attacks, and other serious heart problems. Some people also have mood changes or depression.  In people with severe sleep apnea, getting treated (for example, with CPAP) can help lower these risks.  All topics are updated as new evidence becomes available and our peer review process is complete.  This topic retrieved from [employer] on: Feb 28, 2024.  Topic 56570 Version 18.0  Release: 32.2.4 - C32.58  © 2024 UpToDate, Inc. and/or its affiliates. All rights reserved.  figure 1: Airway in a person with sleep apnea     Normally, when a person sleeps, the airway remains open, and air can pass from the nose and mouth to the lungs. In a person with sleep apnea, parts of the throat and mouth drop into the airway and block off the flow of air. This can cause loud snoring and interrupt breathing for short periods.  Graphic 86891 Version 6.0  figure 2: Continuous positive airway pressure (CPAP) for sleep apnea     The CPAP mask gently blows air into your nose while you sleep. It puts just enough pressure on your airway to keep it from closing. The mask in this picture fits over just the nose. Other CPAP devices have masks that fit over the nose and mouth.  Graphic 76243 Version 5.0  Consumer Information Use and Disclaimer   Disclaimer: This generalized information is a limited summary of diagnosis, treatment, and/or medication information. It is not meant to  be comprehensive and should be used as a tool to help the user understand and/or assess potential diagnostic and treatment options. It does NOT include all information about conditions, treatments, medications, side effects, or risks that may apply to a specific patient. It is not intended to be medical advice or a substitute for the medical advice, diagnosis, or treatment of a health care provider based on the health care provider's examination and assessment of a patient's specific and unique circumstances. Patients must speak with a health care provider for complete information about their health, medical questions, and treatment options, including any risks or benefits regarding use of medications. This information does not endorse any treatments or medications as safe, effective, or approved for treating a specific patient. UpToDate, Inc. and its affiliates disclaim any warranty or liability relating to this information or the use thereof.The use of this information is governed by the Terms of Use, available at https://www.[employer] Electronicsuwer.com/en/know/clinical-effectiveness-terms. 2024© UpToDate, Inc. and its affiliates and/or licensors. All rights reserved.  Copyright   © 2024 UpToDate, Inc. and/or its affiliates. All rights reserved.

## 2025-01-28 NOTE — ASSESSMENT & PLAN NOTE
Obstructive Sleep Apnea - Discussed pathophysiology of ALYCIA, consequences of untreated ALYCIA and treatment options including PAP therapy, mandibular advancement device, positional therapy, and surgical referral. - Discussed risks of sleepy driving and mitigation strategies (napping). Patient agrees to not drive if tired or sleepy. - Advised avoidance of alcohol and centrally acting medications as these can worsen ALYCIA.  -She presents today for new patient evaluation given history of obstructive sleep apnea on auto CPAP therapy.  She reports being diagnosed greater than 5 years ago with obstructive sleep apnea and has been placed on CPAP since that time.  She is continuing with a refurbished [employer] machine.  She is currently using a fullface mask interface.  - CPAP remains medically necessary to treat her sleep disordered breathing.  She reports marked improvement in her sleep quality with regular CPAP use including but not limited to increased energy, increased mood, more refreshing sleep, abolish snoring, decreased napping.  -She has recently been having problems with her machine.  There have been evenings where she has been concerned that it is not working or blowing air appropriately.  She is concerned that the machine is broken beyond repair and will give her problems when she travels to see her family as well as regular nightly use.  -I have placed an order for a new auto CPAP machine with a nasal pillow interface.  The patient knows she will need to be seen within 3 months for initial CPAP compliance visit.  Patient verbalized understanding and stated that she would make this appointment.  Orders:    CPAP Auto New DME

## 2025-01-28 NOTE — PROGRESS NOTES
Name: Smita Valerio      : 1960      MRN: [id number]  Encounter Provider: Galileo Gates MD  Encounter Date: 2025   Encounter department: [facility] SLEEP MEDICINE [facility]    :  Assessment & Plan  ALYCIA (obstructive sleep apnea)  Obstructive Sleep Apnea - Discussed pathophysiology of ALYCIA, consequences of untreated ALYCIA and treatment options including PAP therapy, mandibular advancement device, positional therapy, and surgical referral. - Discussed risks of sleepy driving and mitigation strategies (napping). Patient agrees to not drive if tired or sleepy. - Advised avoidance of alcohol and centrally acting medications as these can worsen ALYCIA.  -She presents today for new patient evaluation given history of obstructive sleep apnea on auto CPAP therapy.  She reports being diagnosed greater than 5 years ago with obstructive sleep apnea and has been placed on CPAP since that time.  She is continuing with a refurbished Janine DreamStation machine.  She is currently using a fullface mask interface.  - CPAP remains medically necessary to treat her sleep disordered breathing.  She reports marked improvement in her sleep quality with regular CPAP use including but not limited to increased energy, increased mood, more refreshing sleep, abolish snoring, decreased napping.  -She has recently been having problems with her machine.  There have been evenings where she has been concerned that it is not working or blowing air appropriately.  She is concerned that the machine is broken beyond repair and will give her problems when she travels to see her family as well as regular nightly use.  -I have placed an order for a new auto CPAP machine with a nasal pillow interface.  The patient knows she will need to be seen within 3 months for initial CPAP compliance visit.  Patient verbalized understanding and stated that she would make this appointment.  Orders:    CPAP Auto New DME    Allergic rhinitis, unspecified  "seasonality, unspecified trigger  We reviewed the impact of the nose and nasal breathing on sleep quality.  I have placed an order for Flonase nasal spray to be used daily as a controller medication.  The patient would like to transition from a fullface mask to a nasal pillow interface.  I have asked her to use this regularly and to continue into our next follow-up visit.  Orders:    fluticasone (FLONASE) 50 mcg/act nasal spray; 1 spray into each nostril daily    Anxiety  We reviewed the association between sleep, mood, and coexisting psychiatric disorders. We discussed the importance of obtaining adequate sleep of at least 7 hours nightly. Patient was encouraged to continue current prescribed medications and to continue follow up with their PCP/psychiatrist for further management.            History of Present Illness     Sitting and reading: (Patient-Rptd) Slight chance of dozing  Watching TV: (Patient-Rptd) Slight chance of dozing  Sitting, inactive in a public place (e.g. a theatre or a meeting): (Patient-Rptd) Would never doze  As a passenger in a car for an hour without a break: (Patient-Rptd) Would never doze  Lying down to rest in the afternoon when circumstances permit: (Patient-Rptd) Slight chance of dozing  Sitting and talking to someone: (Patient-Rptd) Would never doze  Sitting quietly after a lunch without alcohol: (Patient-Rptd) Would never doze  In a car, while stopped for a few minutes in traffic: (Patient-Rptd) Would never doze  Total score: (Patient-Rptd) 3     Pertinent positives/negatives included in HPI and also as noted:     Objective   /78 (BP Location: Right arm, Cuff Size: Adult)   Pulse 84   Ht 5' 6\" (1.676 m)   Wt 76.8 kg (169 lb 6.4 oz)   SpO2 98%   BMI 27.34 kg/m²     Neck Circumference: 17  [facility] New Patient Evaluation    Ms. Valerio is a 64 y.o. female with a PMH of hypothyroidism who presents as a new patient for evaluation of sleep disordered breathing.     I " have reviewed the 10/29/2024 family medicine office note with Fatmata Redding DO.    I have reviewed the 7/23/2024 family medicine office note with NIK Licea.    History of Presenting Illness:    The patient was seen today in the office with her  who contributed to the history.    The patient does not snore with regular CPAP use. There are no choking and gasping episodes with regular CPAP use. There are no witnessed apneas with CPAP use. 1-2x, episode(s) of nocturia occur per night. The patient sleeps on her side. She sleeps with one pillow. There are no reports of nocturnal behaviors.     The patient's Sheffield sleepiness scale score is 3/24 (<=10 is normal). She has not been sleepy while driving. She has not had a fall-asleep motor vehicle accident. There are no reports of hypnagogic hallucinations, cataplexy or sleep paralysis.    In terms of the patient's sleep/wake cycle symptoms:  Bedtime: 11:00pm   SOL: 30-45 minutes   Nocturnal awakenings: 1-2x, Nocturia  Wakeup time: 9:00   Naps: Denied    Total sleep time estimate: Approximately 8 hours.     Weekends are approximately similar to week days.    She has not tried any medications for poor sleep in the past.    Her legs do not bother her on trying to initiate sleep.    She reports a prior history of obstructive sleep apnea diagnosed in approximately 2020.  She reports that she has been on CPAP therapy since this time.  She is using a fullface mask interface.    She reports that she has a refurbished Janine DreamStation machine.  She reports that it has been greater than 5 years since she was initially ordered CPAP.    She is using distilled water, she knows how to change filters, she is receiving regular supplies.    Her CPAP has intermittently been causing problems.  She reports that the machine had not been working and she had to be continuously changing tubing and cleaning it regularly for it to work.  She is concerned that the machine is  broken beyond repair and is concerned for intermittent efficacy of use.    Given problems with CPAP and duration of use, I have placed an order for a new CPAP machine for the patient today in the office.  She will need to see me Within 3 months for initial CPAP compliance visit.  Patient verbalized understanding.    CPAP Compliance  DME: [employer].  Machine Type and Settings: AutoCPAP 4-20 cmH2O   Mask Type: Fullface mask  Compliance Reviewed (2024-2025)  100% Usage (30 out of 30 days).   Used > 4 hours a Night For 96.7% of nights.  AutoCPAP Median Pressure -10.5 cmH2O, 95% Pressure -13.4 cmH20   Average time in large leak per day: 2 minutes and 32 seconds  Residual AHI 5.1 events/hour      Past Medical History:   Diagnosis Date    Arthritis     Complex cyst of right ovary     Disease of thyroid gland     Glaucoma     Hyperthyroidism     Uterus fibroma         Past Surgical History:   Procedure Laterality Date     SECTION      X2    COLONOSCOPY      HYSTERECTOMY      LAPAROTOMY N/A 2016    Procedure: LAPAROTOMY EXPLORATORY; OVARIAN CANCER STAGING ;  Surgeon: Denny Escalante MD;  Location: [facility];  Service:     OOPHORECTOMY      MT HEMORRHOIDECTOMY INT & XTRNL 2/> COLUMN/MELCHOR N/A 2019    Procedure: HEMORRHOIDECTOMY EXCISION;  Surgeon: MARISOL Castaneda MD;  Location: [facility];  Service: Colorectal    MT LAPS TOTAL HYSTERECT 250 GM/< W/RMVL TUBE/OVARY N/A 2016    Procedure: HYSTERECTOMY LAPAROSCOPIC TOTAL (LTH),cystoscopy, prostoctopy;  Surgeon: Denny Escalante MD;  Location: [facility];  Service: Gynecology Oncology    TUBAL LIGATION          No prior upper airway surgeries.     Allergies   Allergen Reactions    Shellfish-Derived Products - Food Allergy Anaphylaxis and Itching        Current Outpatient Medications on File Prior to Visit   Medication Sig Dispense Refill    b complex vitamins tablet Take 1 tablet by mouth daily.      calcium carbonate (OS-MARVA) 600 MG tablet Take 600  mg by mouth daily.      EPINEPHrine (EPIPEN) 0.3 mg/0.3 mL SOAJ INJECT INTRAMUSCULARLY 1 PEN AS  NEEDED FOR ALLERGIC RESPONSE AS  DIRECTED BY MD. SEEK MEDICAL  HELP AFTER USE. 2 each 1    levothyroxine 75 mcg tablet TAKE 1 TABLET BY MOUTH DAILY 90 tablet 3    loratadine (CLARITIN) 10 mg tablet Take 10 mg by mouth daily.      Magnesium 400 MG TABS       Multiple Vitamin (MULTIVITAMIN) tablet Take 1 tablet by mouth daily.      Turmeric 500 MG CAPS       venlafaxine (EFFEXOR-XR) 37.5 mg 24 hr capsule TAKE 1 CAPSULE BY MOUTH DAILY 90 capsule 1    Vitamin D, Cholecalciferol, 1000 UNITS TABS Take 1,000 Units by mouth daily.       No current facility-administered medications on file prior to visit.       Family History: Her family history includes Cancer in her brother; Diabetes in her father; Heart disease in her brother and mother; No Known Problems in her daughter, daughter, maternal aunt, maternal grandfather, maternal grandmother, maternal uncle, paternal aunt, paternal grandfather, paternal grandmother, paternal uncle, and sister; Seizures in her brother.    Brother with ALYCIA on CPAP. Daughter with ALYCIA on CPAP.     Social History:   Job: Previous work at a [employer]  Caffeine: 2 Cups of Coffee in AM, Occasional Iced Tea   Alcohol: Social Alcohol Use  Drugs: CBD Edibles  Tobacco: Denied  Vape: Denied  Exercise: Not Currently    Patient's medications, allergies, past medical, surgical, social and family histories were reviewed in the electronic medical record and updated as appropriate.    Review of Systems: On review of systems, the patient reports: No AM headaches, occasional nasal sinus congestion, rare dry mouth and dry throat when waking.     Vitals:    01/28/25 1300   BP: 126/78   Pulse: 84   SpO2: 98%       Physical Examination:  Gen: No acute distress, not visibly anxious, speaking comfortably  H&N: MM III; no retro/micrognathia; no macroglossia; no visible thyromegaly  Neuro: Alert and oriented x3,  "interactive  Psych: Pleasant, normal affect  Skin: No visible rashes  Respiratory: No accessory muscle use, breathing comfortably  Cardiac: No visible edema of extremities  Abdomen: Soft, NT, ND  Musculoskeletal: Normal ROM Intact of Extremities    Study Results:  I reviewed the following labs:    No results found for: \"FERRITIN\"    I have reviewed the most recent labs below.    Normal WBC of 3.8, normal hemoglobin 13.4, normal platelets of 264.    Lab Results   Component Value Date    WBC 3.8 09/08/2023    HGB 13.4 09/08/2023    HCT 39.7 09/08/2023    MCV 97 09/08/2023     09/08/2023       This SmartLink has not been configured with any valid records.       Lab Results   Component Value Date    SODIUM 139 09/08/2023    K 4.5 09/08/2023     09/08/2023    CO2 27 09/08/2023    BUN 12 09/08/2023    CREATININE 0.73 09/08/2023    GLUC 99 09/08/2023    CALCIUM 9.2 11/02/2017       This SmartLink has not been configured with any valid records.       Lab Results   Component Value Date    FYA2HSAVFILB 4.620 (H) 11/02/2017    TSH 3.890 12/12/2024          Results/Data:  I have reviewed the results and report from the 7/14/2023 transthoracic echocardiogram: Left Ventricle: Left ventricular cavity size is normal. Wall thickness is mildly increased. Systolic function is normal. Estimated ejection fraction is 55-60%. Wall motion is normal. Diastolic function is normal.    Independent Findings Reviewed: Normal left ventricular ejection fraction.  No wall motion abnormalities.    I have reviewed the results and report from the 8/30/2016 chest x-ray.    Independent Findings Reviewed: No acute cardiopulmonary disease.    I answered the patient's questions to the best of my ability. We will continue with longitudinal follow-up for evaluation of sleep disordered breathing. Follow-up will be after sleep testing is completed.    Galileo Gates MD  Sleep Medicine and Internal Medicine  [facility] " Network  01/28/25

## 2025-01-28 NOTE — ASSESSMENT & PLAN NOTE
We reviewed the impact of the nose and nasal breathing on sleep quality.  I have placed an order for Flonase nasal spray to be used daily as a controller medication.  The patient would like to transition from a fullface mask to a nasal pillow interface.  I have asked her to use this regularly and to continue into our next follow-up visit.  Orders:    fluticasone (FLONASE) 50 mcg/act nasal spray; 1 spray into each nostril daily

## 2025-02-06 ENCOUNTER — DOCUMENTATION (OUTPATIENT)
Dept: SLEEP CENTER | Facility: CLINIC | Age: 65
End: 2025-02-06

## 2025-02-06 LAB
DME PARACHUTE DELIVERY DATE ACTUAL: NORMAL
DME PARACHUTE DELIVERY DATE EXPECTED: NORMAL
DME PARACHUTE DELIVERY DATE REQUESTED: NORMAL
DME PARACHUTE ITEM DESCRIPTION: NORMAL
DME PARACHUTE ORDER STATUS: NORMAL
DME PARACHUTE SUPPLIER NAME: NORMAL
DME PARACHUTE SUPPLIER PHONE: NORMAL

## 2025-02-06 NOTE — PROGRESS NOTES
Patient scheduled for DME set up today at [facility].   MACHINE: Airsense 11 auto cpap  PRESSURE: 4-20 cmh20  MASK: Airfit N30 small with a chin strap  TOLERATION: Well  PROVIDED INFORMATION: Welcome guide emailed to patient, DME numbers to contact 24/7, cleaning and replacement schedules.   PATIENT STATUS: 5-year renewal?  MOBILE KULDEEP PAIRED: Yes    Selene Hawkins RRT.

## 2025-02-14 DIAGNOSIS — F41.9 ANXIETY: ICD-10-CM

## 2025-02-14 RX ORDER — VENLAFAXINE HYDROCHLORIDE 37.5 MG/1
37.5 CAPSULE, EXTENDED RELEASE ORAL DAILY
Qty: 90 CAPSULE | Refills: 1 | Status: SHIPPED | OUTPATIENT
Start: 2025-02-14

## 2025-02-18 ENCOUNTER — TELEPHONE (OUTPATIENT)
Age: 65
End: 2025-02-18

## 2025-02-18 NOTE — TELEPHONE ENCOUNTER
Daily Note     Today's date: 10/4/2023  Patient name: Abdi Albright  : 1933  MRN: [id number]  Referring provider: Gloria Kowalski [email or web address]  URL: [email or web address]  Date: 10/04/2023  Prepared by: Deborah Tilleyi    Exercises  - Supine Bridge with Resistance Band  - 3 x weekly - 1-2 sets - 10 reps - 5 sec hold  - Clamshell at Wall in Hip Extension  - 3 x weekly - 1-2 sets - 10 reps  - Supine Active Straight Leg Raise  - 3 x weekly - 1-2 sets - 10 reps  - Hip Abduction with Resistance Loop  - 3 x weekly - 1 sets - 10 reps  - Standing Hip Flexion with Resistance Loop  - 3 x weekly - 1 sets - 10 reps  - Hip Extension with Resistance Loop  - 3 x weekly - 1 sets - 10 reps - 5 hold    Diagnosis:    Precautions:    Primary Goals:    *asterisks by exercise = given for HEP   Manuals 9/20 9/27 10/4                                             Returned call from patient who states when she refill her humidifier, she gets an error message on the screen that there is air leaking through the hose and not going to the mask.  She has to unhook the hose and reattach several times before the error clears. Also, she has used the machine every night since she got it but she received a message through the kp that she didn't use the machine one night.    Advised patient that I would have one of the [employer] liasions contact her to assist.    Emailed liaisons and requested call to patient.    

## 2025-02-18 NOTE — TELEPHONE ENCOUNTER
Patient called in she was seen about 2 weeks ago and received a new cpap machine  Patient has questions in regards to machine and would like a call back please  # [phone]

## 2025-04-04 ENCOUNTER — PATIENT MESSAGE (OUTPATIENT)
Dept: FAMILY MEDICINE CLINIC | Facility: CLINIC | Age: 65
End: 2025-04-04

## 2025-04-04 ENCOUNTER — NURSE TRIAGE (OUTPATIENT)
Age: 65
End: 2025-04-04

## 2025-04-04 DIAGNOSIS — R21 RASH: Primary | ICD-10-CM

## 2025-04-04 RX ORDER — BETAMETHASONE DIPROPIONATE 0.5 MG/G
CREAM TOPICAL 2 TIMES DAILY
Qty: 15 G | Refills: 0 | Status: SHIPPED | OUTPATIENT
Start: 2025-04-04 | End: 2025-04-07 | Stop reason: SDUPTHER

## 2025-04-04 NOTE — TELEPHONE ENCOUNTER
"Reason for Disposition  • Localized rash present > 7 days    Answer Assessment - Initial Assessment Questions  1. APPEARANCE of RASH: \"Describe the rash.\"       Peeling, cracking   2. LOCATION: \"Where is the rash located?\"       Left pointing finger   5. ONSET: \"When did the rash start?\"       A week ago   6. ITCHING: \"Does the rash itch?\" If Yes, ask: \"How bad is the itch?\"  (Scale 0-10; or none, mild, moderate, severe)      Mild 1 out of 10   7. PAIN: \"Does the rash hurt?\" If Yes, ask: \"How bad is the pain?\"  (Scale 0-10; or none, mild, moderate, severe)      No   8. OTHER SYMPTOMS: \"Do you have any other symptoms?\" (e.g., fever)      No   9. PREGNANCY: \"Is there any chance you are pregnant?\" \"When was your last menstrual period?\"      No    Protocols used: Rash or Redness - Localized-Adult-[place]    "

## 2025-04-04 NOTE — TELEPHONE ENCOUNTER
FOLLOW UP: ASAP    REASON FOR CONVERSATION: Rash    SYMPTOMS: peeling itchy rash on right pointing finger     OTHER: Returning symptom. Patient asked the ointment to be called in. Please see the picture in My Chart.     DISPOSITION: Discuss With PCP and Callback by Nurse Today (overriding See Within 3 Days in Office)

## 2025-04-07 RX ORDER — BETAMETHASONE DIPROPIONATE 0.5 MG/G
CREAM TOPICAL 2 TIMES DAILY
Qty: 15 G | Refills: 0 | Status: SHIPPED | OUTPATIENT
Start: 2025-04-07 | End: 2025-04-08 | Stop reason: CLARIF

## 2025-04-07 NOTE — TELEPHONE ENCOUNTER
Smita is calling in regards to the cream that was ordered for her rash. She went to the [facility] but they said they never received the prescription order.     I do not see a confirmation receipt on the prescription.     Please advise if cream can be re-sent.   Thank you!

## 2025-04-08 DIAGNOSIS — R21 RASH: ICD-10-CM

## 2025-04-08 RX ORDER — BETAMETHASONE DIPROPIONATE 0.5 MG/G
CREAM TOPICAL 2 TIMES DAILY
Qty: 15 G | Refills: 0 | Status: SHIPPED | OUTPATIENT
Start: 2025-04-08

## 2025-04-08 NOTE — TELEPHONE ENCOUNTER
Patient calling from pharmacy stating that the prescription for betamethasone dipropionate (DIPROSONE) 0.05 % cream was never received by pharmacy. RN resent as per protocol. NFA.

## 2025-04-23 DIAGNOSIS — E03.9 PRIMARY HYPOTHYROIDISM: ICD-10-CM

## 2025-04-23 RX ORDER — LEVOTHYROXINE SODIUM 75 UG/1
75 TABLET ORAL DAILY
Qty: 90 TABLET | Refills: 1 | Status: SHIPPED | OUTPATIENT
Start: 2025-04-23

## 2025-06-30 DIAGNOSIS — F41.9 ANXIETY: ICD-10-CM

## 2025-07-01 RX ORDER — VENLAFAXINE HYDROCHLORIDE 37.5 MG/1
37.5 CAPSULE, EXTENDED RELEASE ORAL DAILY
Qty: 90 CAPSULE | Refills: 1 | Status: SHIPPED | OUTPATIENT
Start: 2025-07-01

## (undated) DEVICE — 3000CC GUARDIAN II: Brand: GUARDIAN

## (undated) DEVICE — SPONGE STICK WITH PVP-I: Brand: KENDALL

## (undated) DEVICE — POOLE SUCTION HANDLE: Brand: CARDINAL HEALTH

## (undated) DEVICE — LUBRICANT SURGILUBE TUBE 4 OZ  FLIP TOP

## (undated) DEVICE — BETHLEHEM UNIVERSAL MINOR GEN: Brand: CARDINAL HEALTH

## (undated) DEVICE — INTENDED FOR TISSUE SEPARATION, AND OTHER PROCEDURES THAT REQUIRE A SHARP SURGICAL BLADE TO PUNCTURE OR CUT.: Brand: BARD-PARKER SAFETY BLADES SIZE 10, STERILE

## (undated) DEVICE — TUBING SUCTION 5MM X 12 FT

## (undated) DEVICE — PLUMEPEN PRO 10FT

## (undated) DEVICE — GAUZE SPONGES,16 PLY: Brand: CURITY

## (undated) DEVICE — TINCTURE OF BENZOIN SKIN PREP SPRAY IS A SKIN PREP SPRAY THAT ENHANCES THE ADHESION OF TAPE/APPLIANCE WHILE PROTECTING SENSITIVE SKIN FROM ADHESIVES AND BODY FLUIDS.: Brand: TINCTURE OF BENZOIN SPRAY 4OZ US

## (undated) DEVICE — BASIC SINGLE BASIN 2-LF: Brand: MEDLINE INDUSTRIES, INC.

## (undated) DEVICE — GLOVE SRG BIOGEL 7.5

## (undated) DEVICE — 3M™ DURAPORE™ SURGICAL TAPE 1538-3, 3 INCH X 10 YARD (7,5CM X 9,1M), 4 ROLLS/BOX: Brand: 3M™ DURAPORE™